# Patient Record
Sex: MALE | Race: OTHER | URBAN - METROPOLITAN AREA
[De-identification: names, ages, dates, MRNs, and addresses within clinical notes are randomized per-mention and may not be internally consistent; named-entity substitution may affect disease eponyms.]

---

## 2023-01-05 ENCOUNTER — PREPPED CHART (OUTPATIENT)
Dept: URBAN - METROPOLITAN AREA CLINIC 49 | Facility: CLINIC | Age: 78
End: 2023-01-05

## 2023-01-17 ENCOUNTER — NEW PATIENT (OUTPATIENT)
Dept: URBAN - METROPOLITAN AREA CLINIC 49 | Facility: CLINIC | Age: 78
End: 2023-01-17

## 2023-01-17 DIAGNOSIS — Z79.4: ICD-10-CM

## 2023-01-17 DIAGNOSIS — H25.812: ICD-10-CM

## 2023-01-17 DIAGNOSIS — E11.9: ICD-10-CM

## 2023-01-17 PROCEDURE — 92004 COMPRE OPH EXAM NEW PT 1/>: CPT

## 2023-01-17 ASSESSMENT — KERATOMETRY
OD_AXISANGLE2_DEGREES: 90
OD_AXISANGLE_DEGREES: 180
OS_AXISANGLE2_DEGREES: 171
OS_K2POWER_DIOPTERS: 43.75
OD_K2POWER_DIOPTERS: 43.75
OS_K1POWER_DIOPTERS: 43.50
OD_K1POWER_DIOPTERS: 42.75
OS_AXISANGLE_DEGREES: 81

## 2023-01-17 ASSESSMENT — VISUAL ACUITY
OD_GLARE: 20/20
OD_SC: 20/20-1
OD_GLARE: 20/25
OS_GLARE: 20/50
OS_SC: 20/50-1
OS_GLARE: 20/100

## 2023-01-17 ASSESSMENT — TONOMETRY
OD_IOP_MMHG: 12
OS_IOP_MMHG: 12

## 2023-04-03 ENCOUNTER — POST-OP (OUTPATIENT)
Dept: URBAN - METROPOLITAN AREA CLINIC 49 | Facility: CLINIC | Age: 78
End: 2023-04-03

## 2023-04-03 DIAGNOSIS — Z79.4: ICD-10-CM

## 2023-04-03 DIAGNOSIS — E11.9: ICD-10-CM

## 2023-04-03 DIAGNOSIS — Z96.1: ICD-10-CM

## 2023-04-03 DIAGNOSIS — Z98.42: ICD-10-CM

## 2023-04-03 PROCEDURE — 92015 DETERMINE REFRACTIVE STATE: CPT

## 2023-04-03 PROCEDURE — 99024 POSTOP FOLLOW-UP VISIT: CPT

## 2023-04-03 ASSESSMENT — VISUAL ACUITY
OD_SC: 20/25
OD_GLARE: 20/30
OU_SC: 20/25
OS_SC: 20/25
OD_GLARE: 20/30

## 2023-04-03 ASSESSMENT — KERATOMETRY
OD_AXISANGLE_DEGREES: 005
OS_K2POWER_DIOPTERS: 43.50
OS_AXISANGLE2_DEGREES: 4
OS_K1POWER_DIOPTERS: 43.25
OD_AXISANGLE2_DEGREES: 95
OD_K1POWER_DIOPTERS: 43.00
OD_K2POWER_DIOPTERS: 43.50
OS_AXISANGLE_DEGREES: 094

## 2023-04-03 ASSESSMENT — TONOMETRY
OS_IOP_MMHG: 11
OD_IOP_MMHG: 12

## 2025-01-01 ENCOUNTER — APPOINTMENT (INPATIENT)
Dept: CARDIOLOGY | Facility: HOSPITAL | Age: 80
End: 2025-01-01
Attending: PHYSICIAN ASSISTANT
Payer: COMMERCIAL

## 2025-01-01 ENCOUNTER — APPOINTMENT (EMERGENCY)
Dept: RADIOLOGY | Facility: HOSPITAL | Age: 80
End: 2025-01-01
Attending: EMERGENCY MEDICINE
Payer: COMMERCIAL

## 2025-01-01 ENCOUNTER — HOSPITAL ENCOUNTER (INPATIENT)
Facility: HOSPITAL | Age: 80
LOS: 4 days | End: 2025-06-05
Attending: EMERGENCY MEDICINE | Admitting: HOSPITALIST
Payer: COMMERCIAL

## 2025-01-01 ENCOUNTER — APPOINTMENT (INPATIENT)
Dept: RADIOLOGY | Facility: HOSPITAL | Age: 80
End: 2025-01-01
Attending: INTERNAL MEDICINE
Payer: COMMERCIAL

## 2025-01-01 ENCOUNTER — APPOINTMENT (INPATIENT)
Dept: RADIOLOGY | Facility: HOSPITAL | Age: 80
DRG: 871 | End: 2025-01-01
Attending: INTERNAL MEDICINE
Payer: COMMERCIAL

## 2025-01-01 ENCOUNTER — APPOINTMENT (EMERGENCY)
Dept: RADIOLOGY | Facility: HOSPITAL | Age: 80
End: 2025-01-01
Payer: COMMERCIAL

## 2025-01-01 ENCOUNTER — APPOINTMENT (INPATIENT)
Dept: RADIOLOGY | Facility: HOSPITAL | Age: 80
End: 2025-01-01
Attending: STUDENT IN AN ORGANIZED HEALTH CARE EDUCATION/TRAINING PROGRAM
Payer: COMMERCIAL

## 2025-01-01 VITALS
RESPIRATION RATE: 24 BRPM | HEART RATE: 72 BPM | SYSTOLIC BLOOD PRESSURE: 66 MMHG | DIASTOLIC BLOOD PRESSURE: 33 MMHG | OXYGEN SATURATION: 98 % | HEIGHT: 73 IN | TEMPERATURE: 98.7 F | BODY MASS INDEX: 30.27 KG/M2 | WEIGHT: 228.4 LBS

## 2025-01-01 DIAGNOSIS — J18.1 LUNG CONSOLIDATION (CMS/HCC): ICD-10-CM

## 2025-01-01 DIAGNOSIS — I50.9 ACUTE ON CHRONIC CONGESTIVE HEART FAILURE, UNSPECIFIED HEART FAILURE TYPE (CMS/HCC): ICD-10-CM

## 2025-01-01 DIAGNOSIS — R06.02 SHORTNESS OF BREATH: Primary | ICD-10-CM

## 2025-01-01 DIAGNOSIS — J18.9 PNEUMONIA OF BOTH LOWER LOBES DUE TO INFECTIOUS ORGANISM: ICD-10-CM

## 2025-01-01 DIAGNOSIS — K76.9 LIVER LESION: ICD-10-CM

## 2025-01-01 DIAGNOSIS — E87.1 HYPONATREMIA: ICD-10-CM

## 2025-01-01 DIAGNOSIS — R79.89 ELEVATED TROPONIN: ICD-10-CM

## 2025-01-01 LAB
AFP SERPL-MCNC: 1.3 NG/ML
ALBUMIN SERPL-MCNC: 3.8 G/DL (ref 3.5–5.7)
ALBUMIN SERPL-MCNC: 3.8 G/DL (ref 3.5–5.7)
ALP SERPL-CCNC: 133 IU/L (ref 34–125)
ALP SERPL-CCNC: 163 IU/L (ref 34–125)
ALT SERPL-CCNC: 49 IU/L (ref 7–52)
ALT SERPL-CCNC: 70 IU/L (ref 7–52)
AMPHET UR QL SCN: NOT DETECTED
ANION GAP SERPL CALC-SCNC: 10 MEQ/L (ref 3–15)
ANION GAP SERPL CALC-SCNC: 14 MEQ/L (ref 3–15)
ANION GAP SERPL CALC-SCNC: 7 MEQ/L (ref 3–15)
ANION GAP SERPL CALC-SCNC: 9 MEQ/L (ref 3–15)
AORTIC ROOT ANNULUS: 3.2 CM
AORTIC VALVE MEAN VELOCITY: 1.25 M/S
AORTIC VALVE VELOCITY TIME INTEGRAL: 31.8 CM
APTT PPP: 103 SEC (ref 23–35)
APTT PPP: 121 SEC (ref 23–35)
APTT PPP: 135 SEC (ref 23–35)
APTT PPP: 205 SEC (ref 23–35)
APTT PPP: 32 SEC (ref 23–35)
APTT PPP: 32 SEC (ref 23–35)
APTT PPP: 59 SEC (ref 23–35)
APTT PPP: 62 SEC (ref 23–35)
APTT PPP: 69 SEC (ref 23–35)
APTT PPP: 72 SEC (ref 23–35)
APTT PPP: 76 SEC (ref 23–35)
APTT PPP: 78 SEC (ref 23–35)
APTT PPP: 83 SEC (ref 23–35)
APTT PPP: 83 SEC (ref 23–35)
APTT PPP: 95 SEC (ref 23–35)
ASCENDING AORTA: 3.2 CM
AST SERPL-CCNC: 53 IU/L (ref 13–39)
AST SERPL-CCNC: 75 IU/L (ref 13–39)
AV MEAN GRADIENT: 7 MMHG
AV PEAK GRADIENT: 14 MMHG
AV PEAK VELOCITY-S: 1.84 M/S
AV VALVE AREA INDEX: 0.8
AV VALVE AREA: 1.41 CM2
AV VELOCITY RATIO: 0.47
AVA (VTI): 1.81 CM2
B FRAGILIS DNA BLD QL NAA+PROBE: NOT DETECTED
B PARAPERT DNA SPEC QL NAA+PROBE: NEGATIVE
B PERT DNA NPH QL NAA+PROBE: NEGATIVE
BARBITURATES UR QL SCN: NOT DETECTED
BASE EXCESS BLDA CALC-SCNC: -4.3 MEQ/L
BASE EXCESS BLDV CALC-SCNC: -1.8 MEQ/L
BASOPHILS # BLD: 0 K/UL (ref 0.01–0.1)
BASOPHILS # BLD: 0.01 K/UL (ref 0.01–0.1)
BASOPHILS NFR BLD: 0 %
BASOPHILS NFR BLD: 0.1 %
BENZODIAZ UR QL SCN: NOT DETECTED
BILIRUB SERPL-MCNC: 2.1 MG/DL (ref 0.3–1.2)
BILIRUB SERPL-MCNC: 2.6 MG/DL (ref 0.3–1.2)
BILIRUB UR QL STRIP.AUTO: NEGATIVE MG/DL
BLACTX-M ISLT/SPM QL: ABNORMAL
BLAIMP ISLT/SPM QL: ABNORMAL
BLAOXA-48-LIKE ISLT/SPM QL: ABNORMAL
BLAVIM ISLT/SPM QL: ABNORMAL
BNP SERPL-MCNC: 1103 PG/ML
BSA FOR ECHO PROCEDURE: 2.27 M2
BUN SERPL-MCNC: 23 MG/DL (ref 7–25)
BUN SERPL-MCNC: 26 MG/DL (ref 7–25)
BUN SERPL-MCNC: 29 MG/DL (ref 7–25)
BUN SERPL-MCNC: 31 MG/DL (ref 7–25)
BUN SERPL-MCNC: 33 MG/DL (ref 7–25)
BUN SERPL-MCNC: 33 MG/DL (ref 7–25)
BURR CELLS BLD QL SMEAR: ABNORMAL
C ALBICANS DNA BLD QL NAA+PROBE: NOT DETECTED
C AURIS DNA BLD POS QL NAA+PROBE: NOT DETECTED
C GATTII+NEOFOR DNA BLD POS QL NAA+N-PRB: NOT DETECTED
C GLABRATA DNA BLD QL NAA+PROBE: NOT DETECTED
C KRUSEI DNA BLD QL NAA+PROBE: NOT DETECTED
C PARAP DNA BLD QL NAA+PROBE: NOT DETECTED
C PNEUM DNA SPEC QL NAA+PROBE: NEGATIVE
CA-I BLD-SCNC: 1.11 MMOL/L (ref 1.15–1.27)
CALCIUM SERPL-MCNC: 8.5 MG/DL (ref 8.6–10.3)
CALCIUM SERPL-MCNC: 8.5 MG/DL (ref 8.6–10.3)
CALCIUM SERPL-MCNC: 8.7 MG/DL (ref 8.6–10.3)
CALCIUM SERPL-MCNC: 8.8 MG/DL (ref 8.6–10.3)
CALCIUM SERPL-MCNC: 9.2 MG/DL (ref 8.6–10.3)
CALCIUM SERPL-MCNC: 9.2 MG/DL (ref 8.6–10.3)
CANNABINOIDS UR QL SCN: NOT DETECTED
CEA SERPL-MCNC: 10 NG/ML
CHLORIDE BLDA-SCNC: 100 MEQ/L (ref 98–109)
CHLORIDE SERPL-SCNC: 100 MEQ/L (ref 98–107)
CHLORIDE SERPL-SCNC: 102 MEQ/L (ref 98–107)
CHLORIDE SERPL-SCNC: 102 MEQ/L (ref 98–107)
CHLORIDE SERPL-SCNC: 103 MEQ/L (ref 98–107)
CHLORIDE SERPL-SCNC: 94 MEQ/L (ref 98–107)
CHLORIDE SERPL-SCNC: 94 MEQ/L (ref 98–107)
CHLORIDE UR-SCNC: <15 MEQ/L
CLARITY UR REFRACT.AUTO: CLEAR
CO2 BLDA-SCNC: 20.1 MEQ/L (ref 22–32)
CO2 BLDV-SCNC: 25 MEQ/L (ref 22–32)
CO2 SERPL-SCNC: 19 MEQ/L (ref 21–31)
CO2 SERPL-SCNC: 22 MEQ/L (ref 21–31)
CO2 SERPL-SCNC: 22 MEQ/L (ref 21–31)
CO2 SERPL-SCNC: 23 MEQ/L (ref 21–31)
CO2 SERPL-SCNC: 23 MEQ/L (ref 21–31)
CO2 SERPL-SCNC: 25 MEQ/L (ref 21–31)
COCAINE UR QL SCN: NOT DETECTED
COHGB MFR BLD: 2.1 %
COLISTIN RES MCR-1 ISLT/SPM QL: ABNORMAL
COLOR UR AUTO: YELLOW
CPR GENES ISLT NAA+PROBE: ABNORMAL
CREAT SERPL-MCNC: 0.8 MG/DL (ref 0.7–1.3)
CREAT SERPL-MCNC: 0.9 MG/DL (ref 0.7–1.3)
CREAT SERPL-MCNC: 0.9 MG/DL (ref 0.7–1.3)
CREAT SERPL-MCNC: 1.1 MG/DL (ref 0.7–1.3)
CREAT SERPL-MCNC: 1.1 MG/DL (ref 0.7–1.3)
CREAT SERPL-MCNC: 1.3 MG/DL (ref 0.7–1.3)
CREAT UR-MCNC: 49.9 MG/DL
DIFFERENTIAL METHOD BLD: ABNORMAL
DIFFERENTIAL METHOD BLD: ABNORMAL
DOP CALC LVOT STROKE VOLUME: 57.37 CM3
E CLOAC COMP DNA BLD POS NAA+NON-PROBE: NOT DETECTED
E COLI DNA SPEC QL NAA+PROBE: NOT DETECTED
E FAECALIS DNA SPEC QL NAA+PROBE: NOT DETECTED
E FAECIUM DNA SPEC QL NAA+PROBE: NOT DETECTED
E WAVE DECELERATION TIME: 188 MS
EGFRCR SERPLBLD CKD-EPI 2021: 55.5 ML/MIN/1.73M*2
EGFRCR SERPLBLD CKD-EPI 2021: >60 ML/MIN/1.73M*2
ENTEROBACTERALES DNA BLD POS NAA+N-PRB: NOT DETECTED
EOSINOPHIL # BLD: 0 K/UL (ref 0.04–0.54)
EOSINOPHIL # BLD: 0 K/UL (ref 0.04–0.54)
EOSINOPHIL NFR BLD: 0 %
EOSINOPHIL NFR BLD: 0 %
ERYTHROCYTE [DISTWIDTH] IN BLOOD BY AUTOMATED COUNT: 17.5 % (ref 11.6–14.4)
ERYTHROCYTE [DISTWIDTH] IN BLOOD BY AUTOMATED COUNT: 17.9 % (ref 11.6–14.4)
ERYTHROCYTE [DISTWIDTH] IN BLOOD BY AUTOMATED COUNT: 18.2 % (ref 11.6–14.4)
ERYTHROCYTE [DISTWIDTH] IN BLOOD BY AUTOMATED COUNT: 18.3 % (ref 11.6–14.4)
FENTANYL CTO UR SCN-MCNC: NOT DETECTED NG/ML
FIO2 ON VENT: 27 %
FIO2 ON VENT: ABNORMAL %
FLUAV RNA SPEC QL NAA+PROBE: NEGATIVE
FLUAV RNA SPEC QL NAA+PROBE: NEGATIVE
FLUBV RNA SPEC QL NAA+PROBE: NEGATIVE
FLUBV RNA SPEC QL NAA+PROBE: NEGATIVE
FRACTIONAL SHORTENING: 24.62 %
GIANT PLATELETS BLD QL SMEAR: ABNORMAL
GLUCOSE BLD-MCNC: 113 MG/DL (ref 70–99)
GLUCOSE BLD-MCNC: 195 MG/DL (ref 70–99)
GLUCOSE BLD-MCNC: 200 MG/DL (ref 70–99)
GLUCOSE BLD-MCNC: 208 MG/DL (ref 70–99)
GLUCOSE BLD-MCNC: 226 MG/DL (ref 70–99)
GLUCOSE BLD-MCNC: 235 MG/DL (ref 70–99)
GLUCOSE BLD-MCNC: 240 MG/DL (ref 70–99)
GLUCOSE BLD-MCNC: 262 MG/DL (ref 70–99)
GLUCOSE BLD-MCNC: 277 MG/DL (ref 70–99)
GLUCOSE BLD-MCNC: 286 MG/DL (ref 70–99)
GLUCOSE BLD-MCNC: 286 MG/DL (ref 70–99)
GLUCOSE BLD-MCNC: 296 MG/DL (ref 70–99)
GLUCOSE BLD-MCNC: 300 MG/DL (ref 70–99)
GLUCOSE BLD-MCNC: 315 MG/DL (ref 70–99)
GLUCOSE BLD-MCNC: 330 MG/DL (ref 70–99)
GLUCOSE BLD-MCNC: 338 MG/DL (ref 70–99)
GLUCOSE BLDA-MCNC: 299 MG/DL (ref 70–99)
GLUCOSE SERPL-MCNC: 223 MG/DL (ref 70–99)
GLUCOSE SERPL-MCNC: 227 MG/DL (ref 70–99)
GLUCOSE SERPL-MCNC: 228 MG/DL (ref 70–99)
GLUCOSE SERPL-MCNC: 273 MG/DL (ref 70–99)
GLUCOSE SERPL-MCNC: 275 MG/DL (ref 70–99)
GLUCOSE SERPL-MCNC: 317 MG/DL (ref 70–99)
GLUCOSE UR STRIP.AUTO-MCNC: NEGATIVE MG/DL
GP B STREP DNA SPEC QL NAA+PROBE: NOT DETECTED
GRAM STN SPEC: NORMAL
HADV DNA SPEC QL NAA+PROBE: NEGATIVE
HAEM INFLU DNA SPEC QL NAA+PROBE: NOT DETECTED
HCO3 BLDA-SCNC: 21.3 MEQ/L (ref 21–28)
HCO3 BLDV-SCNC: 22.3 MEQ/L (ref 21–28)
HCOV 229E RNA SPEC QL NAA+PROBE: NEGATIVE
HCOV HKU1 RNA SPEC QL NAA+PROBE: NEGATIVE
HCOV NL63 RNA SPEC QL NAA+PROBE: NEGATIVE
HCOV OC43 RNA SPEC QL NAA+PROBE: NEGATIVE
HCT VFR BLD AUTO: 26.2 % (ref 40.1–51)
HCT VFR BLD AUTO: 28 % (ref 40.1–51)
HCT VFR BLD AUTO: 28.4 % (ref 40.1–51)
HCT VFR BLD AUTO: 33.2 % (ref 40.1–51)
HGB BLD-MCNC: 10.8 G/DL (ref 13.7–17.5)
HGB BLD-MCNC: 8.3 G/DL (ref 13.7–17.5)
HGB BLD-MCNC: 8.7 G/DL (ref 13.7–17.5)
HGB BLD-MCNC: 9.1 G/DL (ref 13.7–17.5)
HGB BLDA OXIMETRY-MCNC: 9.7 G/DL (ref 14–17.5)
HGB UR QL STRIP.AUTO: NEGATIVE
HMPV RNA SPEC QL NAA+PROBE: NEGATIVE
HPIV1 RNA SPEC QL NAA+PROBE: NEGATIVE
HPIV2 RNA SPEC QL NAA+PROBE: NEGATIVE
HPIV3 RNA SPEC QL NAA+PROBE: NEGATIVE
HPIV4 RNA SPEC QL NAA+PROBE: NEGATIVE
IMM GRANULOCYTES # BLD AUTO: 0.18 K/UL (ref 0–0.08)
IMM GRANULOCYTES NFR BLD AUTO: 1 %
INHALED O2 CONCENTRATION: ABNORMAL %
INHALED O2 CONCENTRATION: ABNORMAL %
INR PPP: 1.5
INR PPP: 1.6
INTERVENTRICULAR SEPTUM: 1.11 CM
K OXYTOCA DNA BLD QL NAA+PROBE: NOT DETECTED
K PNEUMON DNA SPEC QL NAA+PROBE: NOT DETECTED
K. AEROGENES DNA SPEC QL NAA+PROBE: NOT DETECTED
KETONES UR STRIP.AUTO-MCNC: NEGATIVE MG/DL
L MONOCYTOG DNA SPEC QL NAA+PROBE: NOT DETECTED
LA ESV (BP): 119 CM3
LA ESV INDEX (A2C): 52.86 CM3/M2
LA ESV INDEX (BP): 52.42 CM3/M2
LA/AORTA RATIO: 1.75
LAAS-AP2: 34.2 CM2
LAAS-AP4: 30.7 CM2
LACTATE BLDA-SCNC: 2.3 MMOL/L (ref 0.4–1.6)
LACTATE SERPL-SCNC: 1.9 MMOL/L (ref 0.4–2)
LACTATE SERPL-SCNC: 2.2 MMOL/L (ref 0.4–2)
LACTATE SERPL-SCNC: 2.3 MMOL/L (ref 0.4–2)
LACTATE SERPL-SCNC: 2.4 MMOL/L (ref 0.4–2)
LAD 2D: 5.6 CM
LAL MED-LAT (A4C): 7.18 CM
LAV-S: 120 CM3
LDH SERPL L TO P-CCNC: 437 IU/L (ref 98–271)
LDH SERPL L TO P-CCNC: 466 IU/L (ref 98–271)
LEFT ATRIAL LENGTH SUPERIOR-INFERIOR (APICAL 2-CHAMBER VIEW): 7.96 CM
LEFT ATRIUM VOLUME INDEX: 47.58 CM3/M2
LEFT ATRIUM VOLUME: 108 CM3
LEFT INTERNAL DIMENSION IN SYSTOLE: 3.95 CM (ref 3.95–5.98)
LEFT VENTRICULAR INTERNAL DIMENSION IN DIASTOLE: 5.24 CM (ref 6.8–9.45)
LEFT VENTRICULAR POSTERIOR WALL IN END DIASTOLE: 1.27 CM (ref 0.82–1.53)
LEUKOCYTE ESTERASE UR QL STRIP.AUTO: NEGATIVE
LVOT 2D: 2.2 CM
LVOT A: 3.8 CM2
LVOT MG: 2 MMHG
LVOT MV: 0.63 M/S
LVOT PEAK VELOCITY: 0.87 M/S
LVOT PG: 3 MMHG
LVOT STROKE VOLUME INDEX: 25.27 ML/M2
LVOT VTI: 15.1 CM
LYMPHOCYTES # BLD: 0.38 K/UL (ref 1.2–3.5)
LYMPHOCYTES # BLD: 1.77 K/UL (ref 1.2–3.5)
LYMPHOCYTES NFR BLD: 2.1 %
LYMPHOCYTES NFR BLD: 7 %
M PNEUMO DNA SPEC QL NAA+PROBE: NEGATIVE
MACROCYTES BLD QL SMEAR: ABNORMAL
MAGNESIUM SERPL-MCNC: 2 MG/DL (ref 1.8–2.5)
MAGNESIUM SERPL-MCNC: 2.1 MG/DL (ref 1.8–2.5)
MCH RBC QN AUTO: 34.1 PG (ref 28–33.2)
MCH RBC QN AUTO: 34.7 PG (ref 28–33.2)
MCH RBC QN AUTO: 34.7 PG (ref 28–33.2)
MCH RBC QN AUTO: 35 PG (ref 28–33.2)
MCHC RBC AUTO-ENTMCNC: 31.1 G/DL (ref 32.2–36.5)
MCHC RBC AUTO-ENTMCNC: 31.7 G/DL (ref 32.2–36.5)
MCHC RBC AUTO-ENTMCNC: 32 G/DL (ref 32.2–36.5)
MCHC RBC AUTO-ENTMCNC: 32.5 G/DL (ref 32.2–36.5)
MCV RBC AUTO: 106.8 FL (ref 83–98)
MCV RBC AUTO: 109.2 FL (ref 83–98)
MCV RBC AUTO: 109.6 FL (ref 83–98)
MCV RBC AUTO: 109.8 FL (ref 83–98)
MECA ISLT/SPM QL: DETECTED
MECA+MECC+MREJ ISLT/SPM QL: ABNORMAL
METHGB BLD-SCNC: 0.7 % (ref 0.4–1.5)
MLH CV ECHO AVA INDEX VELOCITY RATIO: 0.6
MONOCYTES # BLD: 0.39 K/UL (ref 0.3–1)
MONOCYTES # BLD: 2.02 K/UL (ref 0.3–1)
MONOCYTES NFR BLD: 2.2 %
MONOCYTES NFR BLD: 8 %
MV PEAK E VEL: 0.98 M/S
MV STENOSIS PRESSURE HALF TIME: 55 MS
MV VALVE AREA P 1/2 METHOD: 4 CM2
MYELOCYTES # BLD MANUAL: 0.5 K/UL
MYELOCYTES NFR BLD MANUAL: 2 %
N MEN DNA BLD QL NAA+PROBE: NOT DETECTED
NDM: ABNORMAL
NEUTROPHILS # BLD: 16.97 K/UL (ref 1.7–7)
NEUTS BAND # BLD: 0.25 K/UL (ref 0–0.53)
NEUTS BAND # BLD: 20.69 K/UL (ref 1.7–7)
NEUTS BAND NFR BLD: 1 %
NEUTS SEG NFR BLD: 82 %
NEUTS SEG NFR BLD: 94.6 %
NITRITE UR QL STRIP.AUTO: NEGATIVE
NRBC BLD-RTO: 0.1 %
OPIATES UR QL SCN: NOT DETECTED
OSMOLALITY SERPL: 293 MOSM/KG (ref 280–300)
OSMOLALITY UR: 341 MOSM/KG (ref 100–1400)
P AERUGINOSA DNA SPEC QL NAA+PROBE: NOT DETECTED
PCO2 BLDA: 29 MM HG (ref 35–48)
PCO2 BLDV: 43 MM HG (ref 41–51)
PCP UR QL SCN: NOT DETECTED
PH BLDA: 7.43 [PH] (ref 7.35–7.45)
PH BLDV: 7.35 [PH] (ref 7.32–7.42)
PH UR STRIP.AUTO: 6 [PH]
PLATELET # BLD AUTO: 117 K/UL (ref 150–350)
PLATELET # BLD AUTO: 149 K/UL (ref 150–350)
PLATELET # BLD AUTO: 98 K/UL (ref 150–350)
PLATELET # BLD AUTO: 99 K/UL (ref 150–350)
PLATELET # BLD EST: ABNORMAL 10*3/UL
PLATELET # BLD EST: ABNORMAL 10*3/UL
PMV BLD AUTO: 8.4 FL (ref 9.4–12.4)
PMV BLD AUTO: 8.7 FL (ref 9.4–12.4)
PO2 BLDA: 49 MM HG (ref 83–100)
PO2 BLDV: <20 MM HG (ref 25–40)
POCT TEST: ABNORMAL
POLYCHROMASIA BLD QL SMEAR: ABNORMAL
POSTERIOR WALL: 1.27 CM
POTASSIUM BLDA-SCNC: 4.4 MEQ/L (ref 3.4–4.5)
POTASSIUM SERPL-SCNC: 3.9 MEQ/L (ref 3.5–5.1)
POTASSIUM SERPL-SCNC: 4 MEQ/L (ref 3.5–5.1)
POTASSIUM SERPL-SCNC: 4.2 MEQ/L (ref 3.5–5.1)
POTASSIUM SERPL-SCNC: 4.3 MEQ/L (ref 3.5–5.1)
POTASSIUM UR-SCNC: 15.1 MEQ/L
PROT SERPL-MCNC: 6.4 G/DL (ref 6–8.2)
PROT SERPL-MCNC: 7.2 G/DL (ref 6–8.2)
PROT UR QL STRIP.AUTO: NEGATIVE
PROTEUS SP DNA BLD POS QL NAA+NON-PROBE: NOT DETECTED
PROTHROMBIN TIME: 17.6 SEC (ref 12.2–14.5)
PROTHROMBIN TIME: 18.4 SEC (ref 12.2–14.5)
PSA SERPL-MCNC: 4.5 NG/ML
QRS DURATION: 94
QT INTERVAL: 406
QTC CALCULATION(BAZETT): 468
R AXIS: 2
RBC # BLD AUTO: 2.39 M/UL (ref 4.5–5.8)
RBC # BLD AUTO: 2.55 M/UL (ref 4.5–5.8)
RBC # BLD AUTO: 2.6 M/UL (ref 4.5–5.8)
RBC # BLD AUTO: 3.11 M/UL (ref 4.5–5.8)
RSV RNA SPEC QL NAA+PROBE: NEGATIVE
RSV RNA SPEC QL NAA+PROBE: NEGATIVE
RV+EV RNA SPEC QL NAA+PROBE: NEGATIVE
S AUREUS DNA SPEC QL NAA+PROBE: NOT DETECTED
S EPIDERMIDIS DNA SPEC QL NAA+PROBE: DETECTED
S HAEMOLYTICUS DNA BLD QL NAA+PROBE: NOT DETECTED
S MALTOPH DNA SPEC QL NAA+PROBE: NOT DETECTED
S MARCESCENS DNA SPEC QL NAA+PROBE: NOT DETECTED
S PNEUM DNA BLD QL NAA+PROBE: NOT DETECTED
S PYO DNA SPEC NAA+PROBE: NOT DETECTED
SALMONELLA DNA SPEC QL NAA+PROBE: NOT DETECTED
SAO2 % BLDA: 81 % (ref 93–98)
SARS-COV-2 RNA RESP QL NAA+PROBE: NEGATIVE
SARS-COV-2 RNA RESP QL NAA+PROBE: NEGATIVE
SEPTAL TISSUE DOPPLER FREE WALL LATE DIA VELOCITY (APICAL 4 CHAMBER VIEW): 0.11 M/S
SODIUM BLDA-SCNC: 125 MEQ/L (ref 136–145)
SODIUM SERPL-SCNC: 126 MEQ/L (ref 136–145)
SODIUM SERPL-SCNC: 127 MEQ/L (ref 136–145)
SODIUM SERPL-SCNC: 132 MEQ/L (ref 136–145)
SODIUM SERPL-SCNC: 134 MEQ/L (ref 136–145)
SODIUM UR-SCNC: <10 MEQ/L
SP GR UR REFRACT.AUTO: 1.03
STREPTOCOCCUS DNA BLD QL NAA+PROBE: NOT DETECTED
T WAVE AXIS: -41
TAPSE: 1.23 CM
TEST PERFORMANCE INFO SPEC: ABNORMAL
TR MAX PG: 27.04 MMHG
TRICUSPID VALVE PEAK REGURGITATION VELOCITY: 2.6 M/S
TROPONIN I SERPL HS-MCNC: 256.3 PG/ML
TROPONIN I SERPL HS-MCNC: 308.3 PG/ML
TROPONIN I SERPL HS-MCNC: 351.3 PG/ML
UROBILINOGEN UR STRIP-ACNC: 0.2 EU/DL
VANA+VANB ISLT/SPM QL: ABNORMAL
VANCOMYCIN TROUGH SERPL-MCNC: 13.2 UG/ML (ref 10–20)
VENTRICULAR RATE: 80
WBC # BLD AUTO: 17.93 K/UL (ref 3.8–10.5)
WBC # BLD AUTO: 21.62 K/UL (ref 3.8–10.5)
WBC # BLD AUTO: 25.23 K/UL (ref 3.8–10.5)
WBC # BLD AUTO: 27.68 K/UL (ref 3.8–10.5)
Z-SCORE OF LEFT VENTRICULAR DIMENSION IN END DIASTOLE: -4.25
Z-SCORE OF LEFT VENTRICULAR DIMENSION IN END SYSTOLE: -1.63
Z-SCORE OF LEFT VENTRICULAR POSTERIOR WALL IN END DIASTOLE: 0.66

## 2025-01-01 PROCEDURE — 25800000 HC PHARMACY IV SOLUTIONS: Performed by: INTERNAL MEDICINE

## 2025-01-01 PROCEDURE — 25800000 HC PHARMACY IV SOLUTIONS: Performed by: HOSPITALIST

## 2025-01-01 PROCEDURE — 99233 SBSQ HOSP IP/OBS HIGH 50: CPT | Mod: 25 | Performed by: NURSE PRACTITIONER

## 2025-01-01 PROCEDURE — 84153 ASSAY OF PSA TOTAL: CPT | Performed by: INTERNAL MEDICINE

## 2025-01-01 PROCEDURE — 85027 COMPLETE CBC AUTOMATED: CPT | Performed by: NURSE PRACTITIONER

## 2025-01-01 PROCEDURE — 80053 COMPREHEN METABOLIC PANEL: CPT | Performed by: EMERGENCY MEDICINE

## 2025-01-01 PROCEDURE — 63600000 HC DRUGS/DETAIL CODE: Mod: JZ | Performed by: PHYSICIAN ASSISTANT

## 2025-01-01 PROCEDURE — 83935 ASSAY OF URINE OSMOLALITY: CPT | Performed by: REGISTERED NURSE

## 2025-01-01 PROCEDURE — 99497 ADVNCD CARE PLAN 30 MIN: CPT | Mod: 25 | Performed by: NURSE PRACTITIONER

## 2025-01-01 PROCEDURE — 20600000 HC ROOM AND CARE INTERMEDIATE/TELEMETRY

## 2025-01-01 PROCEDURE — 99233 SBSQ HOSP IP/OBS HIGH 50: CPT | Performed by: INTERNAL MEDICINE

## 2025-01-01 PROCEDURE — 63600000 HC DRUGS/DETAIL CODE: Mod: JZ | Performed by: NURSE PRACTITIONER

## 2025-01-01 PROCEDURE — 25000000 HC PHARMACY GENERAL: Performed by: HOSPITALIST

## 2025-01-01 PROCEDURE — 63700000 HC SELF-ADMINISTRABLE DRUG: Performed by: STUDENT IN AN ORGANIZED HEALTH CARE EDUCATION/TRAINING PROGRAM

## 2025-01-01 PROCEDURE — 96375 TX/PRO/DX INJ NEW DRUG ADDON: CPT | Mod: 59

## 2025-01-01 PROCEDURE — 27900059 OXYGEN DAILY

## 2025-01-01 PROCEDURE — 63700000 HC SELF-ADMINISTRABLE DRUG: Performed by: INTERNAL MEDICINE

## 2025-01-01 PROCEDURE — 85730 THROMBOPLASTIN TIME PARTIAL: CPT | Performed by: STUDENT IN AN ORGANIZED HEALTH CARE EDUCATION/TRAINING PROGRAM

## 2025-01-01 PROCEDURE — 63600000 HC DRUGS/DETAIL CODE: Mod: JZ | Performed by: HOSPITALIST

## 2025-01-01 PROCEDURE — 25800000 HC PHARMACY IV SOLUTIONS: Performed by: STUDENT IN AN ORGANIZED HEALTH CARE EDUCATION/TRAINING PROGRAM

## 2025-01-01 PROCEDURE — 25000000 HC PHARMACY GENERAL: Performed by: INTERNAL MEDICINE

## 2025-01-01 PROCEDURE — 87070 CULTURE OTHR SPECIMN AEROBIC: CPT | Performed by: INTERNAL MEDICINE

## 2025-01-01 PROCEDURE — 63700000 HC SELF-ADMINISTRABLE DRUG: Performed by: HOSPITALIST

## 2025-01-01 PROCEDURE — 85730 THROMBOPLASTIN TIME PARTIAL: CPT | Performed by: NURSE PRACTITIONER

## 2025-01-01 PROCEDURE — 80048 BASIC METABOLIC PNL TOTAL CA: CPT | Performed by: NURSE PRACTITIONER

## 2025-01-01 PROCEDURE — 85610 PROTHROMBIN TIME: CPT | Performed by: EMERGENCY MEDICINE

## 2025-01-01 PROCEDURE — 82803 BLOOD GASES ANY COMBINATION: CPT | Performed by: HOSPITALIST

## 2025-01-01 PROCEDURE — 87305 ASPERGILLUS AG IA: CPT | Performed by: INTERNAL MEDICINE

## 2025-01-01 PROCEDURE — 96365 THER/PROPH/DIAG IV INF INIT: CPT | Mod: 59

## 2025-01-01 PROCEDURE — 63600000 HC DRUGS/DETAIL CODE: Mod: JZ | Performed by: EMERGENCY MEDICINE

## 2025-01-01 PROCEDURE — 93010 ELECTROCARDIOGRAM REPORT: CPT | Performed by: STUDENT IN AN ORGANIZED HEALTH CARE EDUCATION/TRAINING PROGRAM

## 2025-01-01 PROCEDURE — 63600000 HC DRUGS/DETAIL CODE: Mod: JZ,TB | Performed by: INTERNAL MEDICINE

## 2025-01-01 PROCEDURE — 99223 1ST HOSP IP/OBS HIGH 75: CPT | Performed by: NURSE PRACTITIONER

## 2025-01-01 PROCEDURE — 83735 ASSAY OF MAGNESIUM: CPT | Performed by: NURSE PRACTITIONER

## 2025-01-01 PROCEDURE — 87385 HISTOPLASMA CAPSUL AG IA: CPT | Performed by: INTERNAL MEDICINE

## 2025-01-01 PROCEDURE — 63600000 HC DRUGS/DETAIL CODE: Mod: JZ | Performed by: INTERNAL MEDICINE

## 2025-01-01 PROCEDURE — 0202U NFCT DS 22 TRGT SARS-COV-2: CPT | Performed by: INTERNAL MEDICINE

## 2025-01-01 PROCEDURE — 80048 BASIC METABOLIC PNL TOTAL CA: CPT | Performed by: INTERNAL MEDICINE

## 2025-01-01 PROCEDURE — 82378 CARCINOEMBRYONIC ANTIGEN: CPT | Performed by: HOSPITALIST

## 2025-01-01 PROCEDURE — 99285 EMERGENCY DEPT VISIT HI MDM: CPT | Mod: 25

## 2025-01-01 PROCEDURE — 1123F ACP DISCUSS/DSCN MKR DOCD: CPT | Performed by: NURSE PRACTITIONER

## 2025-01-01 PROCEDURE — 83930 ASSAY OF BLOOD OSMOLALITY: CPT | Performed by: REGISTERED NURSE

## 2025-01-01 PROCEDURE — 82945 GLUCOSE OTHER FLUID: CPT | Performed by: EMERGENCY MEDICINE

## 2025-01-01 PROCEDURE — 51702 INSERT TEMP BLADDER CATH: CPT

## 2025-01-01 PROCEDURE — C8929 TTE W OR WO FOL WCON,DOPPLER: HCPCS

## 2025-01-01 PROCEDURE — 83605 ASSAY OF LACTIC ACID: CPT | Performed by: EMERGENCY MEDICINE

## 2025-01-01 PROCEDURE — 83880 ASSAY OF NATRIURETIC PEPTIDE: CPT | Performed by: EMERGENCY MEDICINE

## 2025-01-01 PROCEDURE — P9045 ALBUMIN (HUMAN), 5%, 250 ML: HCPCS | Mod: JZ | Performed by: HOSPITALIST

## 2025-01-01 PROCEDURE — 63600000 HC DRUGS/DETAIL CODE: Mod: JZ | Performed by: REGISTERED NURSE

## 2025-01-01 PROCEDURE — 83605 ASSAY OF LACTIC ACID: CPT | Performed by: HOSPITALIST

## 2025-01-01 PROCEDURE — 87077 CULTURE AEROBIC IDENTIFY: CPT | Performed by: REGISTERED NURSE

## 2025-01-01 PROCEDURE — 63600000 HC DRUGS/DETAIL CODE: Performed by: HOSPITALIST

## 2025-01-01 PROCEDURE — 71045 X-RAY EXAM CHEST 1 VIEW: CPT

## 2025-01-01 PROCEDURE — 83615 LACTATE (LD) (LDH) ENZYME: CPT | Performed by: HOSPITALIST

## 2025-01-01 PROCEDURE — 82438 ASSAY OTHER FLUID CHLORIDES: CPT | Performed by: REGISTERED NURSE

## 2025-01-01 PROCEDURE — 99239 HOSP IP/OBS DSCHRG MGMT >30: CPT | Performed by: STUDENT IN AN ORGANIZED HEALTH CARE EDUCATION/TRAINING PROGRAM

## 2025-01-01 PROCEDURE — 85025 COMPLETE CBC W/AUTO DIFF WBC: CPT | Performed by: EMERGENCY MEDICINE

## 2025-01-01 PROCEDURE — 99233 SBSQ HOSP IP/OBS HIGH 50: CPT | Performed by: STUDENT IN AN ORGANIZED HEALTH CARE EDUCATION/TRAINING PROGRAM

## 2025-01-01 PROCEDURE — 94660 CPAP INITIATION&MGMT: CPT

## 2025-01-01 PROCEDURE — 71275 CT ANGIOGRAPHY CHEST: CPT

## 2025-01-01 PROCEDURE — 85730 THROMBOPLASTIN TIME PARTIAL: CPT | Performed by: EMERGENCY MEDICINE

## 2025-01-01 PROCEDURE — 36415 COLL VENOUS BLD VENIPUNCTURE: CPT | Performed by: EMERGENCY MEDICINE

## 2025-01-01 PROCEDURE — 82570 ASSAY OF URINE CREATININE: CPT | Performed by: REGISTERED NURSE

## 2025-01-01 PROCEDURE — 74174 CTA ABD&PLVS W/CONTRAST: CPT

## 2025-01-01 PROCEDURE — 82375 ASSAY CARBOXYHB QUANT: CPT | Performed by: EMERGENCY MEDICINE

## 2025-01-01 PROCEDURE — 96374 THER/PROPH/DIAG INJ IV PUSH: CPT | Mod: 59

## 2025-01-01 PROCEDURE — 87449 NOS EACH ORGANISM AG IA: CPT | Performed by: INTERNAL MEDICINE

## 2025-01-01 PROCEDURE — 81003 URINALYSIS AUTO W/O SCOPE: CPT | Performed by: REGISTERED NURSE

## 2025-01-01 PROCEDURE — 36415 COLL VENOUS BLD VENIPUNCTURE: CPT | Performed by: INTERNAL MEDICINE

## 2025-01-01 PROCEDURE — 63700000 HC SELF-ADMINISTRABLE DRUG: Performed by: EMERGENCY MEDICINE

## 2025-01-01 PROCEDURE — 63600105 HC IODINE BASED CONTRAST: Mod: JZ | Performed by: REGISTERED NURSE

## 2025-01-01 PROCEDURE — 84484 ASSAY OF TROPONIN QUANT: CPT | Performed by: HOSPITALIST

## 2025-01-01 PROCEDURE — 84484 ASSAY OF TROPONIN QUANT: CPT | Mod: 91 | Performed by: EMERGENCY MEDICINE

## 2025-01-01 PROCEDURE — 87637 SARSCOV2&INF A&B&RSV AMP PRB: CPT | Performed by: EMERGENCY MEDICINE

## 2025-01-01 PROCEDURE — 94640 AIRWAY INHALATION TREATMENT: CPT

## 2025-01-01 PROCEDURE — 63600000 HC DRUGS/DETAIL CODE: Performed by: INTERNAL MEDICINE

## 2025-01-01 PROCEDURE — 99223 1ST HOSP IP/OBS HIGH 75: CPT | Performed by: INTERNAL MEDICINE

## 2025-01-01 PROCEDURE — 87205 SMEAR GRAM STAIN: CPT | Performed by: INTERNAL MEDICINE

## 2025-01-01 PROCEDURE — 85025 COMPLETE CBC W/AUTO DIFF WBC: CPT | Performed by: STUDENT IN AN ORGANIZED HEALTH CARE EDUCATION/TRAINING PROGRAM

## 2025-01-01 PROCEDURE — 12000000 HC ROOM AND CARE MED/SURG

## 2025-01-01 PROCEDURE — 36600 WITHDRAWAL OF ARTERIAL BLOOD: CPT

## 2025-01-01 PROCEDURE — 27200121 HC CATH FOLEY

## 2025-01-01 PROCEDURE — 83735 ASSAY OF MAGNESIUM: CPT | Performed by: STUDENT IN AN ORGANIZED HEALTH CARE EDUCATION/TRAINING PROGRAM

## 2025-01-01 PROCEDURE — 82105 ALPHA-FETOPROTEIN SERUM: CPT | Performed by: HOSPITALIST

## 2025-01-01 PROCEDURE — 84484 ASSAY OF TROPONIN QUANT: CPT | Performed by: EMERGENCY MEDICINE

## 2025-01-01 PROCEDURE — 84133 ASSAY OF URINE POTASSIUM: CPT | Performed by: REGISTERED NURSE

## 2025-01-01 PROCEDURE — 25800000 HC PHARMACY IV SOLUTIONS: Performed by: REGISTERED NURSE

## 2025-01-01 PROCEDURE — 93005 ELECTROCARDIOGRAM TRACING: CPT

## 2025-01-01 PROCEDURE — 87040 BLOOD CULTURE FOR BACTERIA: CPT | Performed by: REGISTERED NURSE

## 2025-01-01 PROCEDURE — 83605 ASSAY OF LACTIC ACID: CPT | Performed by: REGISTERED NURSE

## 2025-01-01 PROCEDURE — 25500000 HC DRUGS/INCIDENT RAD: Mod: JZ | Performed by: INTERNAL MEDICINE

## 2025-01-01 PROCEDURE — 87154 CUL TYP ID BLD PTHGN 6+ TRGT: CPT | Performed by: REGISTERED NURSE

## 2025-01-01 PROCEDURE — 80048 BASIC METABOLIC PNL TOTAL CA: CPT | Performed by: STUDENT IN AN ORGANIZED HEALTH CARE EDUCATION/TRAINING PROGRAM

## 2025-01-01 PROCEDURE — 36415 COLL VENOUS BLD VENIPUNCTURE: CPT | Performed by: NURSE PRACTITIONER

## 2025-01-01 PROCEDURE — 85730 THROMBOPLASTIN TIME PARTIAL: CPT | Performed by: INTERNAL MEDICINE

## 2025-01-01 PROCEDURE — 83615 LACTATE (LD) (LDH) ENZYME: CPT | Performed by: INTERNAL MEDICINE

## 2025-01-01 PROCEDURE — 85027 COMPLETE CBC AUTOMATED: CPT | Performed by: INTERNAL MEDICINE

## 2025-01-01 PROCEDURE — 96368 THER/DIAG CONCURRENT INF: CPT | Mod: 59

## 2025-01-01 PROCEDURE — 80053 COMPREHEN METABOLIC PANEL: CPT | Performed by: HOSPITALIST

## 2025-01-01 PROCEDURE — 87040 BLOOD CULTURE FOR BACTERIA: CPT | Performed by: INTERNAL MEDICINE

## 2025-01-01 PROCEDURE — 80202 ASSAY OF VANCOMYCIN: CPT | Performed by: INTERNAL MEDICINE

## 2025-01-01 PROCEDURE — 99498 ADVNCD CARE PLAN ADDL 30 MIN: CPT | Mod: 25 | Performed by: NURSE PRACTITIONER

## 2025-01-01 PROCEDURE — P9047 ALBUMIN (HUMAN), 25%, 50ML: HCPCS | Mod: JZ | Performed by: INTERNAL MEDICINE

## 2025-01-01 PROCEDURE — 80307 DRUG TEST PRSMV CHEM ANLYZR: CPT | Performed by: HOSPITALIST

## 2025-01-01 PROCEDURE — 93005 ELECTROCARDIOGRAM TRACING: CPT | Performed by: EMERGENCY MEDICINE

## 2025-01-01 PROCEDURE — 99223 1ST HOSP IP/OBS HIGH 75: CPT | Performed by: HOSPITALIST

## 2025-01-01 RX ORDER — ACETAMINOPHEN 325 MG/1
650 TABLET ORAL EVERY 6 HOURS PRN
Status: DISCONTINUED | OUTPATIENT
Start: 2025-01-01 | End: 2025-01-01 | Stop reason: HOSPADM

## 2025-01-01 RX ORDER — THIAMINE HCL 100 MG
100 TABLET ORAL DAILY
Status: DISCONTINUED | OUTPATIENT
Start: 2025-01-01 | End: 2025-01-01 | Stop reason: HOSPADM

## 2025-01-01 RX ORDER — INSULIN GLARGINE 100 [IU]/ML
12 INJECTION, SOLUTION SUBCUTANEOUS NIGHTLY
Status: DISCONTINUED | OUTPATIENT
Start: 2025-01-01 | End: 2025-01-01 | Stop reason: HOSPADM

## 2025-01-01 RX ORDER — IPRATROPIUM BROMIDE AND ALBUTEROL SULFATE 2.5; .5 MG/3ML; MG/3ML
3 SOLUTION RESPIRATORY (INHALATION) EVERY 6 HOURS
Status: DISCONTINUED | OUTPATIENT
Start: 2025-01-01 | End: 2025-01-01 | Stop reason: HOSPADM

## 2025-01-01 RX ORDER — LORAZEPAM 2 MG/ML
1 INJECTION INTRAMUSCULAR
Status: DISCONTINUED | OUTPATIENT
Start: 2025-01-01 | End: 2025-01-01

## 2025-01-01 RX ORDER — MORPHINE SULFATE 2 MG/ML
2 INJECTION, SOLUTION INTRAMUSCULAR; INTRAVENOUS EVERY 2 HOUR PRN
Status: DISCONTINUED | OUTPATIENT
Start: 2025-01-01 | End: 2025-01-01 | Stop reason: HOSPADM

## 2025-01-01 RX ORDER — LORAZEPAM 2 MG/ML
1 INJECTION INTRAMUSCULAR EVERY 4 HOURS PRN
Status: DISCONTINUED | OUTPATIENT
Start: 2025-01-01 | End: 2025-01-01 | Stop reason: HOSPADM

## 2025-01-01 RX ORDER — METOPROLOL TARTRATE 25 MG/1
25 TABLET, FILM COATED ORAL DAILY
COMMUNITY

## 2025-01-01 RX ORDER — PANTOPRAZOLE SODIUM 40 MG/10ML
40 INJECTION, POWDER, LYOPHILIZED, FOR SOLUTION INTRAVENOUS EVERY 24 HOURS
Status: DISCONTINUED | OUTPATIENT
Start: 2025-01-01 | End: 2025-01-01 | Stop reason: HOSPADM

## 2025-01-01 RX ORDER — FLUTICASONE FUROATE, UMECLIDINIUM BROMIDE AND VILANTEROL TRIFENATATE 200; 62.5; 25 UG/1; UG/1; UG/1
1 POWDER RESPIRATORY (INHALATION) DAILY
COMMUNITY
Start: 2025-01-01

## 2025-01-01 RX ORDER — DOXYCYCLINE 100 MG/1
100 TABLET ORAL 2 TIMES DAILY
COMMUNITY

## 2025-01-01 RX ORDER — FUROSEMIDE 10 MG/ML
20 INJECTION INTRAMUSCULAR; INTRAVENOUS
Status: DISCONTINUED | OUTPATIENT
Start: 2025-01-01 | End: 2025-01-01 | Stop reason: HOSPADM

## 2025-01-01 RX ORDER — ACETAMINOPHEN 500 MG
5 TABLET ORAL NIGHTLY PRN
Status: DISCONTINUED | OUTPATIENT
Start: 2025-01-01 | End: 2025-01-01

## 2025-01-01 RX ORDER — ONDANSETRON HYDROCHLORIDE 2 MG/ML
4 INJECTION, SOLUTION INTRAVENOUS EVERY 8 HOURS PRN
Status: DISCONTINUED | OUTPATIENT
Start: 2025-01-01 | End: 2025-01-01

## 2025-01-01 RX ORDER — ALBUMIN HUMAN 50 G/1000ML
50 SOLUTION INTRAVENOUS ONCE
Status: COMPLETED | OUTPATIENT
Start: 2025-01-01 | End: 2025-01-01

## 2025-01-01 RX ORDER — GLIPIZIDE 5 MG/1
5 TABLET ORAL
COMMUNITY

## 2025-01-01 RX ORDER — LISINOPRIL 10 MG/1
10 TABLET ORAL DAILY
COMMUNITY

## 2025-01-01 RX ORDER — INSULIN LISPRO 100 [IU]/ML
2-10 INJECTION, SOLUTION INTRAVENOUS; SUBCUTANEOUS
Status: DISCONTINUED | OUTPATIENT
Start: 2025-01-01 | End: 2025-01-01 | Stop reason: HOSPADM

## 2025-01-01 RX ORDER — SENNOSIDES 8.6 MG/1
1 TABLET ORAL 2 TIMES DAILY PRN
Status: DISCONTINUED | OUTPATIENT
Start: 2025-01-01 | End: 2025-01-01

## 2025-01-01 RX ORDER — DEXMEDETOMIDINE HYDROCHLORIDE 4 UG/ML
0-1 INJECTION, SOLUTION INTRAVENOUS
Status: DISCONTINUED | OUTPATIENT
Start: 2025-01-01 | End: 2025-01-01 | Stop reason: HOSPADM

## 2025-01-01 RX ORDER — GABAPENTIN 300 MG/1
300 CAPSULE ORAL NIGHTLY
COMMUNITY

## 2025-01-01 RX ORDER — GUAIFENESIN 600 MG/1
600 TABLET, EXTENDED RELEASE ORAL 2 TIMES DAILY
Status: DISCONTINUED | OUTPATIENT
Start: 2025-01-01 | End: 2025-01-01 | Stop reason: HOSPADM

## 2025-01-01 RX ORDER — HEPARIN SODIUM 10000 [USP'U]/100ML
100-4000 INJECTION, SOLUTION INTRAVENOUS
Status: DISCONTINUED | OUTPATIENT
Start: 2025-01-01 | End: 2025-01-01

## 2025-01-01 RX ORDER — LORAZEPAM 2 MG/ML
0.5 INJECTION INTRAMUSCULAR EVERY 2 HOUR PRN
Status: DISCONTINUED | OUTPATIENT
Start: 2025-01-01 | End: 2025-01-01

## 2025-01-01 RX ORDER — TRAMADOL HYDROCHLORIDE 50 MG/1
50 TABLET ORAL EVERY 6 HOURS PRN
Status: DISCONTINUED | OUTPATIENT
Start: 2025-01-01 | End: 2025-01-01

## 2025-01-01 RX ORDER — FAMOTIDINE 20 MG/1
20 TABLET, FILM COATED ORAL NIGHTLY PRN
Status: DISCONTINUED | OUTPATIENT
Start: 2025-01-01 | End: 2025-01-01

## 2025-01-01 RX ORDER — DEXTROSE 50 % IN WATER (D50W) INTRAVENOUS SYRINGE
25 AS NEEDED
Status: DISCONTINUED | OUTPATIENT
Start: 2025-01-01 | End: 2025-01-01 | Stop reason: HOSPADM

## 2025-01-01 RX ORDER — THIAMINE HYDROCHLORIDE 100 MG/ML
200 INJECTION, SOLUTION INTRAMUSCULAR; INTRAVENOUS
Status: COMPLETED | OUTPATIENT
Start: 2025-01-01 | End: 2025-01-01

## 2025-01-01 RX ORDER — VIT C/E/ZN/COPPR/LUTEIN/ZEAXAN 250MG-90MG
1000 CAPSULE ORAL DAILY
Status: DISCONTINUED | OUTPATIENT
Start: 2025-01-01 | End: 2025-01-01 | Stop reason: HOSPADM

## 2025-01-01 RX ORDER — ADHESIVE BANDAGE 7/8"
15-30 BANDAGE TOPICAL AS NEEDED
Status: DISCONTINUED | OUTPATIENT
Start: 2025-01-01 | End: 2025-01-01 | Stop reason: HOSPADM

## 2025-01-01 RX ORDER — SIMVASTATIN 10 MG/1
10 TABLET, FILM COATED ORAL NIGHTLY
COMMUNITY

## 2025-01-01 RX ORDER — LANOLIN ALCOHOL/MO/W.PET/CERES
1000 CREAM (GRAM) TOPICAL DAILY
COMMUNITY

## 2025-01-01 RX ORDER — FAMOTIDINE 20 MG/1
20 TABLET, FILM COATED ORAL NIGHTLY
Status: DISCONTINUED | OUTPATIENT
Start: 2025-01-01 | End: 2025-01-01 | Stop reason: HOSPADM

## 2025-01-01 RX ORDER — FUROSEMIDE 10 MG/ML
20 INJECTION INTRAMUSCULAR; INTRAVENOUS ONCE
Status: COMPLETED | OUTPATIENT
Start: 2025-01-01 | End: 2025-01-01

## 2025-01-01 RX ORDER — HEPARIN SODIUM 10000 [USP'U]/100ML
100-4000 INJECTION, SOLUTION INTRAVENOUS
Status: DISCONTINUED | OUTPATIENT
Start: 2025-01-01 | End: 2025-01-01 | Stop reason: HOSPADM

## 2025-01-01 RX ORDER — LEVOFLOXACIN 750 MG/1
750 TABLET ORAL DAILY
COMMUNITY
End: 2025-01-01 | Stop reason: ENTERED-IN-ERROR

## 2025-01-01 RX ORDER — DIPHENHYDRAMINE HCL 25 MG
25 CAPSULE ORAL EVERY 6 HOURS PRN
Status: DISCONTINUED | OUTPATIENT
Start: 2025-01-01 | End: 2025-01-01 | Stop reason: HOSPADM

## 2025-01-01 RX ORDER — VANCOMYCIN/0.9 % SOD CHLORIDE 1.5G/250ML
1500 PLASTIC BAG, INJECTION (ML) INTRAVENOUS
Status: DISCONTINUED | OUTPATIENT
Start: 2025-01-01 | End: 2025-01-01 | Stop reason: HOSPADM

## 2025-01-01 RX ORDER — METOPROLOL TARTRATE 25 MG/1
25 TABLET, FILM COATED ORAL 2 TIMES DAILY
Status: DISCONTINUED | OUTPATIENT
Start: 2025-01-01 | End: 2025-01-01

## 2025-01-01 RX ORDER — DEXMEDETOMIDINE HYDROCHLORIDE 4 UG/ML
0-1 INJECTION, SOLUTION INTRAVENOUS
Status: DISCONTINUED | OUTPATIENT
Start: 2025-01-01 | End: 2025-01-01

## 2025-01-01 RX ORDER — MORPHINE SULFATE 2 MG/ML
2 INJECTION, SOLUTION INTRAMUSCULAR; INTRAVENOUS ONCE
Status: COMPLETED | OUTPATIENT
Start: 2025-01-01 | End: 2025-01-01

## 2025-01-01 RX ORDER — LISINOPRIL 10 MG/1
10 TABLET ORAL DAILY
Status: DISCONTINUED | OUTPATIENT
Start: 2025-01-01 | End: 2025-01-01 | Stop reason: HOSPADM

## 2025-01-01 RX ORDER — VANCOMYCIN HYDROCHLORIDE
1250
Status: DISCONTINUED | OUTPATIENT
Start: 2025-01-01 | End: 2025-01-01

## 2025-01-01 RX ORDER — VANCOMYCIN 2 GRAM/500 ML IN 0.9 % SODIUM CHLORIDE INTRAVENOUS
25 ONCE
Status: COMPLETED | OUTPATIENT
Start: 2025-01-01 | End: 2025-01-01

## 2025-01-01 RX ORDER — SPIRONOLACTONE 25 MG/1
25 TABLET ORAL DAILY
COMMUNITY

## 2025-01-01 RX ORDER — FOLIC ACID 1 MG/1
1 TABLET ORAL DAILY
Status: DISCONTINUED | OUTPATIENT
Start: 2025-01-01 | End: 2025-01-01 | Stop reason: HOSPADM

## 2025-01-01 RX ORDER — FAMOTIDINE 20 MG/1
20 TABLET, FILM COATED ORAL NIGHTLY PRN
COMMUNITY

## 2025-01-01 RX ORDER — INSULIN GLARGINE 100 [IU]/ML
12 INJECTION, SOLUTION SUBCUTANEOUS EVERY MORNING
COMMUNITY

## 2025-01-01 RX ORDER — ALBUMIN HUMAN 250 G/1000ML
25 SOLUTION INTRAVENOUS ONCE
Status: COMPLETED | OUTPATIENT
Start: 2025-01-01 | End: 2025-01-01

## 2025-01-01 RX ORDER — LORAZEPAM 2 MG/ML
1 INJECTION INTRAMUSCULAR EVERY 30 MIN PRN
Status: DISCONTINUED | OUTPATIENT
Start: 2025-01-01 | End: 2025-01-01

## 2025-01-01 RX ORDER — DIAZEPAM 10 MG/2ML
2.5 INJECTION INTRAMUSCULAR EVERY 2 HOUR PRN
Status: DISCONTINUED | OUTPATIENT
Start: 2025-01-01 | End: 2025-01-01

## 2025-01-01 RX ORDER — ALUMINUM HYDROXIDE, MAGNESIUM HYDROXIDE, AND SIMETHICONE 1200; 120; 1200 MG/30ML; MG/30ML; MG/30ML
30 SUSPENSION ORAL EVERY 4 HOURS PRN
Status: DISCONTINUED | OUTPATIENT
Start: 2025-01-01 | End: 2025-01-01 | Stop reason: HOSPADM

## 2025-01-01 RX ORDER — SODIUM CHLORIDE 1000 MG
1 TABLET, SOLUBLE MISCELLANEOUS
Status: DISCONTINUED | OUTPATIENT
Start: 2025-01-01 | End: 2025-01-01 | Stop reason: HOSPADM

## 2025-01-01 RX ORDER — BENZONATATE 100 MG/1
100 CAPSULE ORAL 3 TIMES DAILY PRN
Status: DISCONTINUED | OUTPATIENT
Start: 2025-01-01 | End: 2025-01-01 | Stop reason: HOSPADM

## 2025-01-01 RX ORDER — ACETYLCYSTEINE 200 MG/ML
200 SOLUTION ORAL; RESPIRATORY (INHALATION)
Status: DISCONTINUED | OUTPATIENT
Start: 2025-01-01 | End: 2025-01-01 | Stop reason: HOSPADM

## 2025-01-01 RX ORDER — FAMOTIDINE 20 MG/1
20 TABLET, FILM COATED ORAL 2 TIMES DAILY
Status: DISCONTINUED | OUTPATIENT
Start: 2025-01-01 | End: 2025-01-01

## 2025-01-01 RX ORDER — HYDRALAZINE HYDROCHLORIDE 20 MG/ML
10 INJECTION INTRAMUSCULAR; INTRAVENOUS EVERY 4 HOURS PRN
Status: DISCONTINUED | OUTPATIENT
Start: 2025-01-01 | End: 2025-01-01

## 2025-01-01 RX ORDER — NAPROXEN SODIUM 220 MG/1
325 TABLET, FILM COATED ORAL ONCE
Status: COMPLETED | OUTPATIENT
Start: 2025-01-01 | End: 2025-01-01

## 2025-01-01 RX ORDER — METOPROLOL TARTRATE 25 MG/1
12.5 TABLET, FILM COATED ORAL 2 TIMES DAILY
Status: DISCONTINUED | OUTPATIENT
Start: 2025-01-01 | End: 2025-01-01 | Stop reason: HOSPADM

## 2025-01-01 RX ORDER — MUPIROCIN CALCIUM 20 MG/G
CREAM TOPICAL 3 TIMES DAILY
COMMUNITY
End: 2025-01-01 | Stop reason: ENTERED-IN-ERROR

## 2025-01-01 RX ORDER — PRAVASTATIN SODIUM 20 MG/1
20 TABLET ORAL DAILY
Status: DISCONTINUED | OUTPATIENT
Start: 2025-01-01 | End: 2025-01-01 | Stop reason: HOSPADM

## 2025-01-01 RX ORDER — DIAZEPAM 10 MG/2ML
2 INJECTION INTRAMUSCULAR
Status: DISCONTINUED | OUTPATIENT
Start: 2025-01-01 | End: 2025-01-01

## 2025-01-01 RX ORDER — DOCUSATE SODIUM 100 MG/1
100 CAPSULE, LIQUID FILLED ORAL 2 TIMES DAILY
Status: DISCONTINUED | OUTPATIENT
Start: 2025-01-01 | End: 2025-01-01 | Stop reason: HOSPADM

## 2025-01-01 RX ORDER — POLYETHYLENE GLYCOL 3350 17 G/17G
17 POWDER, FOR SOLUTION ORAL DAILY PRN
Status: DISCONTINUED | OUTPATIENT
Start: 2025-01-01 | End: 2025-01-01 | Stop reason: HOSPADM

## 2025-01-01 RX ORDER — GABAPENTIN 300 MG/1
300 CAPSULE ORAL 3 TIMES DAILY
Status: DISCONTINUED | OUTPATIENT
Start: 2025-01-01 | End: 2025-01-01

## 2025-01-01 RX ORDER — IOPAMIDOL 755 MG/ML
100 INJECTION, SOLUTION INTRAVASCULAR
Status: COMPLETED | OUTPATIENT
Start: 2025-01-01 | End: 2025-01-01

## 2025-01-01 RX ORDER — DEXTROSE 40 %
15-30 GEL (GRAM) ORAL AS NEEDED
Status: DISCONTINUED | OUTPATIENT
Start: 2025-01-01 | End: 2025-01-01 | Stop reason: HOSPADM

## 2025-01-01 RX ORDER — DIAZEPAM 10 MG/2ML
5 INJECTION INTRAMUSCULAR EVERY 30 MIN PRN
Status: DISCONTINUED | OUTPATIENT
Start: 2025-01-01 | End: 2025-01-01

## 2025-01-01 RX ORDER — GABAPENTIN 300 MG/1
300 CAPSULE ORAL NIGHTLY
Status: DISCONTINUED | OUTPATIENT
Start: 2025-01-01 | End: 2025-01-01 | Stop reason: HOSPADM

## 2025-01-01 RX ADMIN — GABAPENTIN 300 MG: 300 CAPSULE ORAL at 20:16

## 2025-01-01 RX ADMIN — FAMOTIDINE 20 MG: 20 TABLET, FILM COATED ORAL at 21:39

## 2025-01-01 RX ADMIN — INSULIN GLARGINE 12 UNITS: 100 INJECTION, SOLUTION SUBCUTANEOUS at 12:23

## 2025-01-01 RX ADMIN — HEPARIN SODIUM 1350 UNITS/HR: 10000 INJECTION, SOLUTION INTRAVENOUS at 05:44

## 2025-01-01 RX ADMIN — MOMETASONE FUROATE AND FORMOTEROL FUMARATE DIHYDRATE 2 PUFF: 200; 5 AEROSOL RESPIRATORY (INHALATION) at 07:55

## 2025-01-01 RX ADMIN — MOMETASONE FUROATE AND FORMOTEROL FUMARATE DIHYDRATE 2 PUFF: 200; 5 AEROSOL RESPIRATORY (INHALATION) at 20:09

## 2025-01-01 RX ADMIN — IPRATROPIUM BROMIDE AND ALBUTEROL SULFATE 3 ML: .5; 3 SOLUTION RESPIRATORY (INHALATION) at 02:34

## 2025-01-01 RX ADMIN — DIAZEPAM 2.5 MG: 10 INJECTION, SOLUTION INTRAMUSCULAR; INTRAVENOUS at 14:04

## 2025-01-01 RX ADMIN — DIAZEPAM 2.5 MG: 10 INJECTION, SOLUTION INTRAMUSCULAR; INTRAVENOUS at 01:47

## 2025-01-01 RX ADMIN — FOLIC ACID 1 MG: 1 TABLET ORAL at 08:11

## 2025-01-01 RX ADMIN — SODIUM CHLORIDE 1 G: 1 TABLET ORAL at 08:11

## 2025-01-01 RX ADMIN — CYANOCOBALAMIN TAB 500 MCG 1000 MCG: 500 TAB at 08:45

## 2025-01-01 RX ADMIN — ALBUMIN (HUMAN) 50 G: 12.5 SOLUTION INTRAVENOUS at 19:37

## 2025-01-01 RX ADMIN — IPRATROPIUM BROMIDE AND ALBUTEROL SULFATE 3 ML: .5; 3 SOLUTION RESPIRATORY (INHALATION) at 23:52

## 2025-01-01 RX ADMIN — AZITHROMYCIN MONOHYDRATE 500 MG: 500 INJECTION, POWDER, LYOPHILIZED, FOR SOLUTION INTRAVENOUS at 15:14

## 2025-01-01 RX ADMIN — IPRATROPIUM BROMIDE AND ALBUTEROL SULFATE 3 ML: .5; 3 SOLUTION RESPIRATORY (INHALATION) at 07:56

## 2025-01-01 RX ADMIN — IPRATROPIUM BROMIDE AND ALBUTEROL SULFATE 3 ML: .5; 3 SOLUTION RESPIRATORY (INHALATION) at 14:39

## 2025-01-01 RX ADMIN — DOCUSATE SODIUM 100 MG: 100 CAPSULE, LIQUID FILLED ORAL at 09:08

## 2025-01-01 RX ADMIN — FOLIC ACID 1 MG: 1 TABLET ORAL at 08:45

## 2025-01-01 RX ADMIN — IPRATROPIUM BROMIDE AND ALBUTEROL SULFATE 3 ML: .5; 3 SOLUTION RESPIRATORY (INHALATION) at 14:53

## 2025-01-01 RX ADMIN — IPRATROPIUM BROMIDE AND ALBUTEROL SULFATE 3 ML: .5; 3 SOLUTION RESPIRATORY (INHALATION) at 19:07

## 2025-01-01 RX ADMIN — IPRATROPIUM BROMIDE AND ALBUTEROL SULFATE 3 ML: .5; 3 SOLUTION RESPIRATORY (INHALATION) at 03:32

## 2025-01-01 RX ADMIN — SODIUM CHLORIDE 1 G: 1 TABLET ORAL at 17:27

## 2025-01-01 RX ADMIN — WATER 1250 MG: 1 INJECTION INTRAMUSCULAR; INTRAVENOUS; SUBCUTANEOUS at 20:16

## 2025-01-01 RX ADMIN — DEXMEDETOMIDINE 1 MCG/KG/HR: 100 INJECTION, SOLUTION INTRAVENOUS at 15:34

## 2025-01-01 RX ADMIN — SODIUM CHLORIDE 1 G: 1 TABLET ORAL at 11:56

## 2025-01-01 RX ADMIN — DIAZEPAM 2.5 MG: 10 INJECTION, SOLUTION INTRAMUSCULAR; INTRAVENOUS at 19:46

## 2025-01-01 RX ADMIN — LISINOPRIL 10 MG: 10 TABLET ORAL at 08:54

## 2025-01-01 RX ADMIN — ACETAMINOPHEN 650 MG: 325 TABLET ORAL at 19:43

## 2025-01-01 RX ADMIN — SODIUM CHLORIDE 1 G: 1 TABLET ORAL at 12:32

## 2025-01-01 RX ADMIN — CEFTRIAXONE SODIUM 1 G: 1 INJECTION, POWDER, FOR SOLUTION INTRAMUSCULAR; INTRAVENOUS at 15:15

## 2025-01-01 RX ADMIN — ACETYLCYSTEINE 200 MG: 200 INHALANT RESPIRATORY (INHALATION) at 19:53

## 2025-01-01 RX ADMIN — SODIUM CHLORIDE 500 ML: 9 INJECTION, SOLUTION INTRAVENOUS at 14:50

## 2025-01-01 RX ADMIN — WATER 2000 MG: 1 INJECTION INTRAMUSCULAR; INTRAVENOUS; SUBCUTANEOUS at 09:12

## 2025-01-01 RX ADMIN — BENZONATATE 100 MG: 100 CAPSULE ORAL at 19:42

## 2025-01-01 RX ADMIN — GABAPENTIN 300 MG: 300 CAPSULE ORAL at 21:25

## 2025-01-01 RX ADMIN — METHYLPREDNISOLONE SODIUM SUCCINATE 40 MG: 40 INJECTION, POWDER, FOR SOLUTION INTRAMUSCULAR; INTRAVENOUS at 12:23

## 2025-01-01 RX ADMIN — FAMOTIDINE 20 MG: 20 TABLET, FILM COATED ORAL at 21:25

## 2025-01-01 RX ADMIN — METOPROLOL TARTRATE 12.5 MG: 25 TABLET, FILM COATED ORAL at 19:42

## 2025-01-01 RX ADMIN — METOPROLOL TARTRATE 12.5 MG: 25 TABLET, FILM COATED ORAL at 09:09

## 2025-01-01 RX ADMIN — ALBUMIN (HUMAN) 25 G: 12.5 SOLUTION INTRAVENOUS at 17:20

## 2025-01-01 RX ADMIN — IPRATROPIUM BROMIDE AND ALBUTEROL SULFATE 3 ML: .5; 3 SOLUTION RESPIRATORY (INHALATION) at 19:52

## 2025-01-01 RX ADMIN — FAMOTIDINE 20 MG: 20 TABLET, FILM COATED ORAL at 20:16

## 2025-01-01 RX ADMIN — WATER 1500 MG: 1 INJECTION INTRAMUSCULAR; INTRAVENOUS; SUBCUTANEOUS at 09:20

## 2025-01-01 RX ADMIN — PANTOPRAZOLE SODIUM 40 MG: 40 INJECTION, POWDER, FOR SOLUTION INTRAVENOUS at 08:11

## 2025-01-01 RX ADMIN — INSULIN LISPRO 8 UNITS: 100 INJECTION, SOLUTION INTRAVENOUS; SUBCUTANEOUS at 09:14

## 2025-01-01 RX ADMIN — TRAMADOL HYDROCHLORIDE 50 MG: 50 TABLET, COATED ORAL at 10:42

## 2025-01-01 RX ADMIN — THIAMINE HYDROCHLORIDE 200 MG: 100 INJECTION, SOLUTION INTRAMUSCULAR; INTRAVENOUS at 10:39

## 2025-01-01 RX ADMIN — SODIUM CHLORIDE 500 ML: 9 INJECTION, SOLUTION INTRAVENOUS at 12:43

## 2025-01-01 RX ADMIN — ACETYLCYSTEINE 200 MG: 200 INHALANT RESPIRATORY (INHALATION) at 07:52

## 2025-01-01 RX ADMIN — IPRATROPIUM BROMIDE AND ALBUTEROL SULFATE 3 ML: .5; 3 SOLUTION RESPIRATORY (INHALATION) at 02:39

## 2025-01-01 RX ADMIN — ACETAMINOPHEN 650 MG: 325 TABLET ORAL at 10:04

## 2025-01-01 RX ADMIN — MOMETASONE FUROATE AND FORMOTEROL FUMARATE DIHYDRATE 2 PUFF: 200; 5 AEROSOL RESPIRATORY (INHALATION) at 07:54

## 2025-01-01 RX ADMIN — PANTOPRAZOLE SODIUM 40 MG: 40 INJECTION, POWDER, FOR SOLUTION INTRAVENOUS at 08:52

## 2025-01-01 RX ADMIN — INSULIN LISPRO 8 UNITS: 100 INJECTION, SOLUTION INTRAVENOUS; SUBCUTANEOUS at 12:46

## 2025-01-01 RX ADMIN — MORPHINE SULFATE 2 MG: 2 INJECTION, SOLUTION INTRAMUSCULAR; INTRAVENOUS at 13:16

## 2025-01-01 RX ADMIN — LORAZEPAM 1 MG: 2 INJECTION INTRAMUSCULAR; INTRAVENOUS at 17:29

## 2025-01-01 RX ADMIN — GUAIFENESIN 600 MG: 600 TABLET, EXTENDED RELEASE ORAL at 09:09

## 2025-01-01 RX ADMIN — METOPROLOL TARTRATE 12.5 MG: 25 TABLET, FILM COATED ORAL at 12:05

## 2025-01-01 RX ADMIN — GUAIFENESIN 600 MG: 600 TABLET, EXTENDED RELEASE ORAL at 08:54

## 2025-01-01 RX ADMIN — METHYLPREDNISOLONE SODIUM SUCCINATE 40 MG: 40 INJECTION, POWDER, FOR SOLUTION INTRAMUSCULAR; INTRAVENOUS at 11:21

## 2025-01-01 RX ADMIN — TIOTROPIUM BROMIDE INHALATION SPRAY 2 PUFF: 3.12 SPRAY, METERED RESPIRATORY (INHALATION) at 07:55

## 2025-01-01 RX ADMIN — THIAMINE HCL TAB 100 MG 100 MG: 100 TAB at 09:08

## 2025-01-01 RX ADMIN — HEPARIN SODIUM 1450 UNITS/HR: 10000 INJECTION, SOLUTION INTRAVENOUS at 03:34

## 2025-01-01 RX ADMIN — THIAMINE HYDROCHLORIDE 200 MG: 100 INJECTION, SOLUTION INTRAMUSCULAR; INTRAVENOUS at 19:01

## 2025-01-01 RX ADMIN — LISINOPRIL 10 MG: 10 TABLET ORAL at 09:09

## 2025-01-01 RX ADMIN — HEPARIN SODIUM 1350 UNITS/HR: 10000 INJECTION, SOLUTION INTRAVENOUS at 10:36

## 2025-01-01 RX ADMIN — SODIUM CHLORIDE 1 G: 1 TABLET ORAL at 08:54

## 2025-01-01 RX ADMIN — INSULIN LISPRO 4 UNITS: 100 INJECTION, SOLUTION INTRAVENOUS; SUBCUTANEOUS at 08:55

## 2025-01-01 RX ADMIN — AZITHROMYCIN MONOHYDRATE 500 MG: 500 INJECTION, POWDER, LYOPHILIZED, FOR SOLUTION INTRAVENOUS at 16:02

## 2025-01-01 RX ADMIN — THIAMINE HYDROCHLORIDE 200 MG: 100 INJECTION, SOLUTION INTRAMUSCULAR; INTRAVENOUS at 02:01

## 2025-01-01 RX ADMIN — SODIUM CHLORIDE 1 G: 1 TABLET ORAL at 16:36

## 2025-01-01 RX ADMIN — GABAPENTIN 300 MG: 300 CAPSULE ORAL at 21:39

## 2025-01-01 RX ADMIN — INSULIN LISPRO 8 UNITS: 100 INJECTION, SOLUTION INTRAVENOUS; SUBCUTANEOUS at 12:32

## 2025-01-01 RX ADMIN — ASPIRIN 81 MG CHEWABLE TABLET 324 MG: 81 TABLET CHEWABLE at 16:04

## 2025-01-01 RX ADMIN — METOPROLOL TARTRATE 12.5 MG: 25 TABLET, FILM COATED ORAL at 08:54

## 2025-01-01 RX ADMIN — THIAMINE HYDROCHLORIDE 200 MG: 100 INJECTION, SOLUTION INTRAMUSCULAR; INTRAVENOUS at 09:33

## 2025-01-01 RX ADMIN — HEPARIN SODIUM 1550 UNITS/HR: 10000 INJECTION, SOLUTION INTRAVENOUS at 19:31

## 2025-01-01 RX ADMIN — DEXMEDETOMIDINE 0.2 MCG/KG/HR: 100 INJECTION, SOLUTION INTRAVENOUS at 09:57

## 2025-01-01 RX ADMIN — PRAVASTATIN SODIUM 20 MG: 20 TABLET ORAL at 09:08

## 2025-01-01 RX ADMIN — GUAIFENESIN 600 MG: 600 TABLET, EXTENDED RELEASE ORAL at 08:45

## 2025-01-01 RX ADMIN — GUAIFENESIN 600 MG: 600 TABLET, EXTENDED RELEASE ORAL at 19:43

## 2025-01-01 RX ADMIN — MOMETASONE FUROATE AND FORMOTEROL FUMARATE DIHYDRATE 2 PUFF: 200; 5 AEROSOL RESPIRATORY (INHALATION) at 19:53

## 2025-01-01 RX ADMIN — INSULIN LISPRO 6 UNITS: 100 INJECTION, SOLUTION INTRAVENOUS; SUBCUTANEOUS at 12:02

## 2025-01-01 RX ADMIN — SODIUM CHLORIDE 1 G: 1 TABLET ORAL at 08:46

## 2025-01-01 RX ADMIN — DIAZEPAM 5 MG: 5 INJECTION INTRAMUSCULAR; INTRAVENOUS at 12:09

## 2025-01-01 RX ADMIN — IPRATROPIUM BROMIDE AND ALBUTEROL SULFATE 3 ML: .5; 3 SOLUTION RESPIRATORY (INHALATION) at 20:09

## 2025-01-01 RX ADMIN — IOPAMIDOL 100 ML: 755 INJECTION, SOLUTION INTRAVENOUS at 16:57

## 2025-01-01 RX ADMIN — FOLIC ACID 1 MG: 1 TABLET ORAL at 19:00

## 2025-01-01 RX ADMIN — THIAMINE HYDROCHLORIDE 200 MG: 100 INJECTION, SOLUTION INTRAMUSCULAR; INTRAVENOUS at 01:30

## 2025-01-01 RX ADMIN — SODIUM CHLORIDE 1 G: 1 TABLET ORAL at 12:18

## 2025-01-01 RX ADMIN — TIOTROPIUM BROMIDE INHALATION SPRAY 2 PUFF: 3.12 SPRAY, METERED RESPIRATORY (INHALATION) at 16:35

## 2025-01-01 RX ADMIN — AZITHROMYCIN MONOHYDRATE 500 MG: 500 INJECTION, POWDER, LYOPHILIZED, FOR SOLUTION INTRAVENOUS at 16:36

## 2025-01-01 RX ADMIN — FUROSEMIDE 20 MG: 10 INJECTION, SOLUTION INTRAMUSCULAR; INTRAVENOUS at 12:18

## 2025-01-01 RX ADMIN — IPRATROPIUM BROMIDE AND ALBUTEROL SULFATE 3 ML: .5; 3 SOLUTION RESPIRATORY (INHALATION) at 07:59

## 2025-01-01 RX ADMIN — GUAIFENESIN 600 MG: 600 TABLET, EXTENDED RELEASE ORAL at 20:00

## 2025-01-01 RX ADMIN — LISINOPRIL 10 MG: 10 TABLET ORAL at 08:46

## 2025-01-01 RX ADMIN — TIOTROPIUM BROMIDE INHALATION SPRAY 2 PUFF: 3.12 SPRAY, METERED RESPIRATORY (INHALATION) at 07:59

## 2025-01-01 RX ADMIN — METHYLPREDNISOLONE SODIUM SUCCINATE 40 MG: 40 INJECTION, POWDER, FOR SOLUTION INTRAMUSCULAR; INTRAVENOUS at 22:22

## 2025-01-01 RX ADMIN — INSULIN GLARGINE 12 UNITS: 100 INJECTION, SOLUTION SUBCUTANEOUS at 21:39

## 2025-01-01 RX ADMIN — INSULIN GLARGINE 12 UNITS: 100 INJECTION, SOLUTION SUBCUTANEOUS at 21:46

## 2025-01-01 RX ADMIN — CEFTRIAXONE SODIUM 1 G: 1 INJECTION, POWDER, FOR SOLUTION INTRAMUSCULAR; INTRAVENOUS at 15:16

## 2025-01-01 RX ADMIN — THIAMINE HYDROCHLORIDE 200 MG: 100 INJECTION, SOLUTION INTRAMUSCULAR; INTRAVENOUS at 17:22

## 2025-01-01 RX ADMIN — CEFTRIAXONE SODIUM 1 G: 1 INJECTION, POWDER, FOR SOLUTION INTRAMUSCULAR; INTRAVENOUS at 14:27

## 2025-01-01 RX ADMIN — PRAVASTATIN SODIUM 20 MG: 20 TABLET ORAL at 08:54

## 2025-01-01 RX ADMIN — DIAZEPAM 2.5 MG: 10 INJECTION, SOLUTION INTRAMUSCULAR; INTRAVENOUS at 00:44

## 2025-01-01 RX ADMIN — CYANOCOBALAMIN TAB 500 MCG 1000 MCG: 500 TAB at 09:08

## 2025-01-01 RX ADMIN — CYANOCOBALAMIN TAB 500 MCG 1000 MCG: 500 TAB at 11:56

## 2025-01-01 RX ADMIN — GUAIFENESIN 600 MG: 600 TABLET, EXTENDED RELEASE ORAL at 21:25

## 2025-01-01 RX ADMIN — FUROSEMIDE 20 MG: 10 INJECTION, SOLUTION INTRAMUSCULAR; INTRAVENOUS at 17:22

## 2025-01-01 RX ADMIN — HEPARIN SODIUM 1600 UNITS/HR: 10000 INJECTION, SOLUTION INTRAVENOUS at 17:46

## 2025-01-01 RX ADMIN — INSULIN LISPRO 6 UNITS: 100 INJECTION, SOLUTION INTRAVENOUS; SUBCUTANEOUS at 18:04

## 2025-01-01 RX ADMIN — THIAMINE HYDROCHLORIDE 200 MG: 100 INJECTION, SOLUTION INTRAMUSCULAR; INTRAVENOUS at 21:25

## 2025-01-01 RX ADMIN — PANTOPRAZOLE SODIUM 40 MG: 40 INJECTION, POWDER, FOR SOLUTION INTRAVENOUS at 08:54

## 2025-01-01 RX ADMIN — CEFTRIAXONE SODIUM 1 G: 1 INJECTION, POWDER, FOR SOLUTION INTRAMUSCULAR; INTRAVENOUS at 15:49

## 2025-01-01 RX ADMIN — IPRATROPIUM BROMIDE AND ALBUTEROL SULFATE 3 ML: .5; 3 SOLUTION RESPIRATORY (INHALATION) at 14:37

## 2025-01-01 RX ADMIN — WATER 1250 MG: 1 INJECTION INTRAMUSCULAR; INTRAVENOUS; SUBCUTANEOUS at 21:41

## 2025-01-01 RX ADMIN — TIOTROPIUM BROMIDE INHALATION SPRAY 2 PUFF: 3.12 SPRAY, METERED RESPIRATORY (INHALATION) at 07:54

## 2025-01-01 RX ADMIN — FOLIC ACID 1 MG: 1 TABLET ORAL at 08:54

## 2025-01-01 RX ADMIN — ACETYLCYSTEINE 200 MG: 200 INHALANT RESPIRATORY (INHALATION) at 14:37

## 2025-01-01 RX ADMIN — AZITHROMYCIN MONOHYDRATE 500 MG: 500 INJECTION, POWDER, LYOPHILIZED, FOR SOLUTION INTRAVENOUS at 16:40

## 2025-01-01 RX ADMIN — MOMETASONE FUROATE AND FORMOTEROL FUMARATE DIHYDRATE 2 PUFF: 200; 5 AEROSOL RESPIRATORY (INHALATION) at 20:11

## 2025-01-01 RX ADMIN — ACETAMINOPHEN 650 MG: 325 TABLET ORAL at 08:12

## 2025-01-01 RX ADMIN — THIAMINE HYDROCHLORIDE 200 MG: 100 INJECTION, SOLUTION INTRAMUSCULAR; INTRAVENOUS at 10:42

## 2025-01-01 RX ADMIN — FOLIC ACID 1 MG: 1 TABLET ORAL at 09:08

## 2025-01-01 RX ADMIN — IPRATROPIUM BROMIDE AND ALBUTEROL SULFATE 3 ML: .5; 3 SOLUTION RESPIRATORY (INHALATION) at 07:52

## 2025-01-01 RX ADMIN — WATER 1250 MG: 1 INJECTION INTRAMUSCULAR; INTRAVENOUS; SUBCUTANEOUS at 09:40

## 2025-01-01 RX ADMIN — CYANOCOBALAMIN TAB 500 MCG 1000 MCG: 500 TAB at 08:54

## 2025-01-01 RX ADMIN — PANTOPRAZOLE SODIUM 40 MG: 40 INJECTION, POWDER, FOR SOLUTION INTRAVENOUS at 19:13

## 2025-01-01 RX ADMIN — INSULIN LISPRO 2 UNITS: 100 INJECTION, SOLUTION INTRAVENOUS; SUBCUTANEOUS at 08:40

## 2025-01-01 RX ADMIN — SODIUM CHLORIDE 500 ML: 9 INJECTION, SOLUTION INTRAVENOUS at 12:42

## 2025-01-01 RX ADMIN — AZITHROMYCIN MONOHYDRATE 500 MG: 500 INJECTION, POWDER, LYOPHILIZED, FOR SOLUTION INTRAVENOUS at 15:16

## 2025-01-01 RX ADMIN — SODIUM CHLORIDE 1 G: 1 TABLET ORAL at 17:42

## 2025-01-01 RX ADMIN — POLYETHYLENE GLYCOL 3350 17 G: 17 POWDER, FOR SOLUTION ORAL at 17:42

## 2025-01-01 RX ADMIN — SODIUM CHLORIDE: 9 INJECTION INTRAMUSCULAR; INTRAVENOUS; SUBCUTANEOUS at 10:22

## 2025-01-01 RX ADMIN — INSULIN LISPRO 6 UNITS: 100 INJECTION, SOLUTION INTRAVENOUS; SUBCUTANEOUS at 09:39

## 2025-01-01 RX ADMIN — METOPROLOL TARTRATE 12.5 MG: 25 TABLET, FILM COATED ORAL at 21:00

## 2025-01-01 RX ADMIN — HEPARIN SODIUM 1650 UNITS/HR: 10000 INJECTION, SOLUTION INTRAVENOUS at 13:16

## 2025-01-01 RX ADMIN — METOPROLOL TARTRATE 12.5 MG: 25 TABLET, FILM COATED ORAL at 08:49

## 2025-01-01 RX ADMIN — INSULIN LISPRO 6 UNITS: 100 INJECTION, SOLUTION INTRAVENOUS; SUBCUTANEOUS at 17:42

## 2025-01-01 RX ADMIN — SODIUM CHLORIDE 1 G: 1 TABLET ORAL at 09:08

## 2025-01-01 RX ADMIN — INSULIN LISPRO 4 UNITS: 100 INJECTION, SOLUTION INTRAVENOUS; SUBCUTANEOUS at 12:17

## 2025-01-01 RX ADMIN — DOCUSATE SODIUM 100 MG: 100 CAPSULE, LIQUID FILLED ORAL at 20:00

## 2025-01-01 RX ADMIN — CEFTRIAXONE SODIUM 1 G: 1 INJECTION, POWDER, FOR SOLUTION INTRAMUSCULAR; INTRAVENOUS at 16:37

## 2025-01-01 RX ADMIN — DOCUSATE SODIUM 100 MG: 100 CAPSULE, LIQUID FILLED ORAL at 10:04

## 2025-01-01 RX ADMIN — DIAZEPAM 2 MG: 10 INJECTION, SOLUTION INTRAMUSCULAR; INTRAVENOUS at 11:45

## 2025-01-01 RX ADMIN — PRAVASTATIN SODIUM 20 MG: 20 TABLET ORAL at 08:46

## 2025-01-01 RX ADMIN — PANTOPRAZOLE SODIUM 40 MG: 40 INJECTION, POWDER, FOR SOLUTION INTRAVENOUS at 09:13

## 2025-01-01 RX ADMIN — MOMETASONE FUROATE AND FORMOTEROL FUMARATE DIHYDRATE 2 PUFF: 200; 5 AEROSOL RESPIRATORY (INHALATION) at 07:59

## 2025-01-01 RX ADMIN — PRAVASTATIN SODIUM 20 MG: 20 TABLET ORAL at 11:56

## 2025-01-01 RX ADMIN — MOMETASONE FUROATE AND FORMOTEROL FUMARATE DIHYDRATE 2 PUFF: 200; 5 AEROSOL RESPIRATORY (INHALATION) at 16:35

## 2025-01-01 ASSESSMENT — COGNITIVE AND FUNCTIONAL STATUS - GENERAL
MOVING TO AND FROM BED TO CHAIR: 1 - TOTAL
WALKING IN HOSPITAL ROOM: 1 - TOTAL
CLIMB 3 TO 5 STEPS WITH RAILING: 1 - TOTAL
STANDING UP FROM CHAIR USING ARMS: 1 - TOTAL
CLIMB 3 TO 5 STEPS WITH RAILING: 1 - TOTAL
STANDING UP FROM CHAIR USING ARMS: 1 - TOTAL
MOVING TO AND FROM BED TO CHAIR: 1 - TOTAL
STANDING UP FROM CHAIR USING ARMS: 1 - TOTAL
STANDING UP FROM CHAIR USING ARMS: 1 - TOTAL
CLIMB 3 TO 5 STEPS WITH RAILING: 1 - TOTAL
WALKING IN HOSPITAL ROOM: 1 - TOTAL
WALKING IN HOSPITAL ROOM: 1 - TOTAL
STANDING UP FROM CHAIR USING ARMS: 1 - TOTAL
CLIMB 3 TO 5 STEPS WITH RAILING: 1 - TOTAL
WALKING IN HOSPITAL ROOM: 1 - TOTAL
MOVING TO AND FROM BED TO CHAIR: 2 - A LOT
STANDING UP FROM CHAIR USING ARMS: 1 - TOTAL
MOVING TO AND FROM BED TO CHAIR: 2 - A LOT
WALKING IN HOSPITAL ROOM: 1 - TOTAL
WALKING IN HOSPITAL ROOM: 1 - TOTAL
MOVING TO AND FROM BED TO CHAIR: 2 - A LOT
MOVING TO AND FROM BED TO CHAIR: 1 - TOTAL

## 2025-01-01 ASSESSMENT — ENCOUNTER SYMPTOMS
FEVER: 0
WEAKNESS: 0
COUGH: 0
ACTIVITY CHANGE: 0
AGITATION: 0
DIARRHEA: 0
NECK PAIN: 0
HEADACHES: 0
SEIZURES: 0
CHILLS: 1
SPEECH DIFFICULTY: 0
COLOR CHANGE: 0
NAUSEA: 0
ABDOMINAL PAIN: 0
BACK PAIN: 0
SHORTNESS OF BREATH: 1
VOMITING: 0
DIFFICULTY URINATING: 1

## 2025-06-01 PROBLEM — J18.9 PNEUMONIA OF BOTH LOWER LOBES DUE TO INFECTIOUS ORGANISM: Status: ACTIVE | Noted: 2025-01-01

## 2025-06-01 PROBLEM — A41.9: Status: ACTIVE | Noted: 2025-01-01

## 2025-06-01 PROBLEM — A41.9 SEPSIS (CMS/HCC): Status: ACTIVE | Noted: 2025-01-01

## 2025-06-01 PROBLEM — R65.20: Status: ACTIVE | Noted: 2025-01-01

## 2025-06-01 PROBLEM — A40.9 SEPSIS DUE TO STREPTOCOCCUS SPECIES WITHOUT ACUTE ORGAN DYSFUNCTION (CMS/HCC): Status: ACTIVE | Noted: 2025-01-01

## 2025-06-01 PROBLEM — N17.1: Status: ACTIVE | Noted: 2025-01-01

## 2025-06-01 PROBLEM — R16.0 LIVER MASS: Status: ACTIVE | Noted: 2025-01-01

## 2025-06-01 NOTE — ED PROVIDER NOTES
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  Presents to the emergency department for an evaluation of shortness of breath.  Patient reports he is chronically short of breath however the past 3 days he has had worsening shortness of breath.  States that he is not able to walk a few feet without needing to stop.  Associated symptoms were chills. Patient also notes that he had difficulty urinating and did not urinate all day yesterday however this morning was able to urinate.  Patient normally lives in [place] however is visiting notes that he did not bring his insulin and has not had his insulin the past 3 days.  Patient also admits to alcohol use as well as a current every day smoker.  Denies chest pain, abdominal pain, pain with urination, nausea/vomiting, lower leg swelling or pain, or any other concerns at this time.  Recent traveled from [place].          Patient History   PAST HISTORY     Reviewed from Nursing Triage:       Past Medical History:   Diagnosis Date    A-fib (CMS/[facility])     Anemia     CHF (congestive heart failure) (CMS/[facility])     COPD (chronic obstructive pulmonary disease) (CMS/[facility])     Hyperlipidemia     Kidney disease     Type 2 diabetes mellitus (CMS/[facility])        No past surgical history on file.    No family history on file.    Social History     Tobacco Use    Smoking status: Some Days     Types: Cigars    Smokeless tobacco: Never   Substance Use Topics    Alcohol use: Never    Drug use:  Never         Review of Systems   REVIEW OF SYSTEMS     Review of Systems   Constitutional:  Positive for chills. Negative for activity change and fever.   Respiratory:  Positive for shortness of breath. Negative for cough.    Cardiovascular:  Negative for chest pain and leg swelling.   Gastrointestinal:  Negative for abdominal pain, diarrhea, nausea and vomiting.   Genitourinary:  Positive for difficulty urinating (now resolved).   Musculoskeletal:  Negative for back pain and neck pain.   Skin:  Negative for color change.   Neurological:  Negative for seizures, syncope, speech difficulty, weakness and headaches.   Psychiatric/Behavioral:  Negative for agitation and behavioral problems.          VITALS     ED Vitals      Date/Time Temp Pulse Resp BP SpO2 [facility]   06/01/25 1353 36.3 °C (97.3 °F) 96 28 82/56 -- AG                         Physical Exam   PHYSICAL EXAM     Physical Exam  Vitals and nursing note reviewed.   Constitutional:       Appearance: He is well-developed.   HENT:      Head: Normocephalic and atraumatic.   Eyes:      Extraocular Movements: Extraocular movements intact.      Conjunctiva/sclera: Conjunctivae normal.   Cardiovascular:      Rate and Rhythm: Normal rate. Rhythm irregular.   Pulmonary:      Effort: Pulmonary effort is normal. Tachypnea present.      Breath sounds: Examination of the right-lower field reveals decreased breath sounds. Examination of the left-lower field reveals decreased breath sounds. Decreased breath sounds present.   Abdominal:      General: There is no distension.      Palpations: Abdomen is soft. There is no mass.      Tenderness: There is no abdominal tenderness.   Musculoskeletal:         General: No tenderness or deformity. Normal range of motion.      Cervical back: Normal range of motion.      Right lower leg: No edema.      Left lower leg: No edema.   Skin:     General: Skin is warm and dry.      Coloration: Skin is pale.   Neurological:      Mental Status: He is  alert and oriented to person, place, and time. Mental status is at baseline.   Psychiatric:         Behavior: Behavior normal.           PROCEDURES     Procedures     DATA     Results       Procedure Component Value Units Date/Time    CBC and differential [[id number]]  (Abnormal) Collected: 06/01/25 1418    Specimen: Blood, Venous Updated: 06/01/25 1428     WBC 25.23 K/uL      RBC 3.11 M/uL      Hemoglobin 10.8 g/dL      Hematocrit 33.2 %      .8 fL      MCH 34.7 pg      MCHC 32.5 g/dL      RDW 17.5 %      Platelets 149 K/uL      MPV 8.4 fL     Comprehensive metabolic panel [[id number]] Collected: 06/01/25 1418    Specimen: Blood, Venous Updated: 06/01/25 1424    HS Troponin I (with 2 hour reflex) [[id number]] Collected: 06/01/25 1418    Specimen: Blood, Venous Updated: 06/01/25 1424    RAINBOW LT BLUE [[id number]] Collected: 06/01/25 1418    Specimen: Blood, Venous Updated: 06/01/25 1424    SARS-COV-2 (COVID-19)/ FLU A/B, AND RSV, PCR Nasopharynx [[id number]] Collected: 06/01/25 1419    Specimen: Nasopharyngeal Swab from Nasopharynx Updated: 06/01/25 1423    Closplint Draw Panel [[id number]] Collected: 06/01/25 1418    Specimen: Blood, Venous Updated: 06/01/25 1423    Narrative:      The following orders were created for panel order Closplint Draw Panel.  Procedure                               Abnormality         Status                     ---------                               -----------         ------                     RAINBOW PINK[[id number]]                                     In process                 RAINBOW LT BLUE[[id number]]                                  In process                   Please view results for these tests on the individual orders.    RAINBOW PINK [[id number]] Collected: 06/01/25 1418    Specimen: Blood, Venous Updated: 06/01/25 1423            Imaging Results    None         ECG 12 lead          Scoring tools                                  ED Course & MDM   MDM / ED COURSE / CLINICAL  IMPRESSION / DISPO     Medical Decision Making  Impression: 79-year-old male presenting emergency department for an evaluation of shortness of breath.  Plan: CBC, CMP, troponin with reflex, chest x-ray, EKGt. No evidence of volume overload on exam.  EKG without signs of active ischemia or STEMI.    Dispo: Admitted to the hospital for further evaluation, treatment, and management.  Patient agreeable amenable plan.    Problems Addressed:  Acute on chronic congestive heart failure, unspecified heart failure type (CMS/HCC): acute illness or injury  Elevated troponin: acute illness or injury  Hyponatremia: acute illness or injury  Liver lesion: acute illness or injury  Lung consolidation (CMS/HCC): acute illness or injury  Pneumonia of both lower lobes due to infectious organism: acute illness or injury  Shortness of breath: acute illness or injury    Amount and/or Complexity of Data Reviewed  Labs: ordered. Decision-making details documented in ED Course.  Radiology: ordered and independent interpretation performed. Decision-making details documented in ED Course.  ECG/medicine tests: ordered and independent interpretation performed.    Risk  OTC drugs.  Prescription drug management.  Decision regarding hospitalization.        ED Course as of 06/01/25 1725   Sun Jun 01, 2025   1434 D/w Dr. Zavala: I transmitted EKG's -I repeated the first EKG and the inferior lead was probably more artifact than real.  He is not concerned for acute STEMI.  We will continue to workup medically. [SB]   1437 WBC(!): 25.23  Sepsis labs ordered.  Questionable sepsis.  500mL of normal saline ordered at this time due to patient's history of CHF.  IV antibiotics ordered [CF]   1450 Patient is hemodynamically stable, alert, tachypneic however resting comfortably during evaluation.  The care and work-up of this patient was discussed with attending physician Dr. Castellon   [CF]   5564 The patient's GFR is over 30.  We are going to CAT scan his  chest to rule out PE, dissection, aneurysm or other life-threatening abnormalities.  Nurses are unable to get an accurate O2 sat.  They are still working on it. [SB]   1458 X-RAY CHEST 1 VIEW  OTHER: Mild pulmonary edema with trace bilateral pleural effusions suggestive of  mild CHF. Superimposed patchy airspace opacities, most significant in the lower  lobes, suspicious for superimposed multifocal pneumonia. No definite  pneumothorax or large pleural effusion. Cardiomediastinal silhouette mostly  obscured and not well evaluated.     --  IMPRESSION: Please see comment. Recommend follow-up radiographs or chest CT in  6-8 weeks.  IV antibiotics ordered [CF]   1503 Lactate(!): 2.3 [CF]   1524 Trop: 314.9 [SB]   1530 Patient does not have a cardiologist locally.  The family believes they would like him to stay in the [place] area and not go back to [place].  I am going to consult [facility]. [SB]   1538 D/w Dr. Meredith ([facility]) - consider heparinizing the patient for NSTEMI and to admit.   His BP is currently 106/59 and 111/49.     The patient's first lactic acid is 2.3.  There is a possibility that the patient is septic.  However, with a elevated BNP of over thousand and an elevated troponin, I do not believe the patient can get a 30 cc/kg fluid bolus.  We are awaiting the CT scan to come back before deciding disposition. [SB]   1542 BNP(!): 1,103 [CF]   1542 As we are unable to get an O2 sat, I am ordering an ABG. [SB]   1543 I have ordered the unfractionated heparin.     D/w Britni ([facility] for ICU): they are going to review the CT scan and get back to us.  [SB]   1715 High Sens Troponin I(!!): 256.3  2 [CF]   1716 CT ANGIOGRAPHY CHEST/ABDOMEN/PELVIS WITH AND WITHOUT IV CONTRAST  IMPRESSION:  1.  No evidence of pulmonary embolus or acute aortic syndrome.  2.  Extensive bilateral groundglass opacity on a background of fibrotic  interstitial lung disease which may represent acute exacerbation of chronic ILD  or a new  infectious, inflammatory, hemorrhagic or edema from congestive heart  failure.  3.  Right lower lobe lung consolidation with associated mediastinal and hilar  adenopathy which may represent infection or neoplasm.  4.  Multiple liver lesions suggesting metastatic disease.  5.  Multiple other chronic findings.   [CF]   1718 Spoke with Milagros from ICU and they stated as long as pressures are stable patient should be eligible for floor.  [facility] paged for admission. [CF]   1724 Report given to Dr. Austin with [facility]  [CF]      ED Course User Index  [CF] Radha Anguiano CRNP  [SB] Chele Castellon MD     Clinical Impression      None                 Radha Anguiano CRNP  06/01/25 1723

## 2025-06-01 NOTE — ED ATTESTATION NOTE
Critical Care    Performed by: Chele Castellon MD  Authorized by: Chele Castellon MD    Critical care provider statement:     Critical care time (minutes):  30    Critical care was necessary to treat or prevent imminent or life-threatening deterioration of the following conditions:  Respiratory failure    Critical care was time spent personally by me on the following activities:  Discussions with consultants, evaluation of patient's response to treatment, examination of patient, re-evaluation of patient's condition, ordering and review of radiographic studies and ordering and review of laboratory studies    Physical Exam  Review of Systems  Medical Decision Making  Amount and/or Complexity of Data Reviewed  Labs: ordered. Decision-making details documented in ED Course.  Radiology: ordered and independent interpretation performed. Decision-making details documented in ED Course.  ECG/medicine tests: ordered and independent interpretation performed.    Risk  OTC drugs.  Prescription drug management.        6/1/20252:22 PM  I have personally seen and examined the patient. I was involved in the care and medical decision making for this patient.    I reviewed and agree with the PA/NP/Resident's assessment and plan of care, with any exceptions as documented below.    My focused history, examination, assessment, and plan of care of [place] is as follows:  The patient presents with shortness of breath it has been going on for 3 days.  Patient is visiting from [place].  Patient has a history of COPD, interstitial lung disease, hypertensive kidney disease, heart failure, non-insulin-dependent diabetes, coronary artery disease and atrial fibrillation.  Exam: Decreased breath sounds bilaterally consistent with COPD  Irregularly irregular rhythm but rate controlled  Impression/Plan/Medical Decision Making: IV, labs, chest x-ray, BNP, observe    The patient's EKG has some ST elevation in 3 and also some deep T wave  inversions anteriorly.  There were no old EKGs for comparison.  I am going to repeat the EKG.  I am paging the interventional list to send them the EKG        I was physically present for the key/critical portions of the following procedures: None    This document was created using Dragon dictation software.  There might be some typographical errors due to this technology.    We are in a period of time with increased volumes and decreased capacity.      Chele Castellon MD  06/01/25 1530       Chele Castellon MD  06/01/25 9441

## 2025-06-01 NOTE — H&P
Hospital Medicine Service -  History & Physical        CHIEF COMPLAINT     Chief Complaint   Patient presents with    Difficulty Urinating    Shortness of Breath        HISTORY OF PRESENT ILLNESS      79 y.o. male with a past medical history of A-fib on Eliquis, chronic anemia, HFpEF 40%, COPD not on home O2, HLD CKD DM HTN. Lives at  [facility] independent living home in [place] as patient can do most of his ADLs.  However family contact patient 3 weeks ago and has been less committed to as patient admitted to the family of not doing well.  Family eventually were able to bring back back to this area 4 days ago.  However daughter who provided most of the history noticing more of shortness of breath poor appetite less ambulation due to shortness of breath.  However with the now decreasing urine output and difficulty of urinating patient was brought here by the family.  Patient is awake alert x 3 temperature was 97.3 heart rate 90 blood pressure was 81/44 O2 sat was 92% on room air l EKG showed no ST-T wave changes labs showed sodium was 126 potassium 3.9 BUN 33 creatinine 1.3 glucose 275 AST is 75 ALT 70 alk phos is 163 T. bili is 2.6 LDH is 437 uric acid is 2.3/troponin was 351/256 BNP is 1/1/2003 WBCs 25.23 hemoglobin 10.8 hematocrit 33.2 MCV is 100 platelets 149 CT of the chest was negative for PE did show extensive bilateral patchy ground glass opacities mediastinal lymph nodes extensive liver lesions up to 4.3 for PE patient was started on IV Rocephin and Zithromax ED physician contacted cardiology on-call not a STEMI patient recommends heparin drip explained the patient is critical but stable situation patient wanted to be DNR but still be aggressive ED physician contacted ICU since the patient has been hemodynamically stable patient sustained PCU with ED talking to [facility] [place] [facility] presenting for admission and further evaluation  PAST MEDICAL AND SURGICAL HISTORY      Past Medical History:   Diagnosis Date    A-fib  (CMS/[facility])     Anemia     CHF (congestive heart failure) (CMS/[facility])     COPD (chronic obstructive pulmonary disease) (CMS/HCC)     Hyperlipidemia     Kidney disease     Type 2 diabetes mellitus (CMS/HCC)        No past surgical history on file.    MEDICATIONS      Prior to Admission medications   Medication Sig Start Date End Date Taking? Authorizing Provider   apixaban (ELIQUIS) 5 mg tablet Take 5 mg by mouth 2 (two) times a day.   Yes ProviderDoug MD   cyanocobalamin (VITAMIN B12) 1,000 mcg tablet Take 1,000 mcg by mouth daily.   Yes ProviderDoug MD   doxycycline monohydrate (ADOXA) 100 mg tablet Take 100 mg by mouth 2 (two) times a day.   Yes Doug Cartwright MD   famotidine (PEPCID) 20 mg tablet Take 20 mg by mouth 2 (two) times a day.   Yes Doug Cartwright MD   gabapentin (NEURONTIN) 300 mg capsule Take 300 mg by mouth 3 (three) times a day.   Yes Doug Cartwright MD   levoFLOXacin (LEVAQUIN) 750 mg tablet Take 750 mg by mouth daily.   Yes ProviderDoug MD   lisinopriL (PRINIVIL) 10 mg tablet Take 10 mg by mouth daily.   Yes Doug Cartwright MD   metoprolol tartrate (LOPRESSOR) 25 mg tablet Take 25 mg by mouth 2 (two) times a day.   Yes ProviderDoug MD   mupirocin (BACTROBAN) 2 % cream Apply topically 3 (three) times a day.   Yes Doug Cartwright MD   simvastatin (ZOCOR) 10 mg tablet Take 10 mg by mouth nightly.   Yes ProviderDoug MD   spironolactone (ALDACTONE) 25 mg tablet Take 25 mg by mouth daily.   Yes ProviderDoug MD       ALLERGIES      Metformin hcl    FAMILY HISTORY      No family history on file.    SOCIAL HISTORY      Social History     Socioeconomic History    Marital status:    Tobacco Use    Smoking status: Some Days     Types: Cigars    Smokeless tobacco: Never   Substance and Sexual Activity    Alcohol use: Never    Drug use: Never     Social Drivers of Health     Food  Insecurity: No Food Insecurity (6/1/2025)    Hunger Vital Sign     Worried About Running Out of Food in the Last Year: Never true     Ran Out of Food in the Last Year: Never true       REVIEW OF SYSTEMS        Review of Systems  Constitutional: exertional fatigue and 40 pound loss of unexpected weight change.   Eyes: Negative for visual disturbance. Mouth no sore throat  Respiratory: Exertional cough and shortness of breath.  Orthopnea  Cardiovascular: Negative for chest pain and palpitations.   Gastrointestinal: Negative for abdominal pain, constipation, diarrhea, nausea and vomiting.  Poor appetite  Genitourinary: As difficulty urinating. no hematuria no frequency no urgency no discharge  Musculoskeletal: Negative for arthralgias.   Skin: Negative for rash.   Neurological: Negative for dizziness and headaches.   Hematological: Does not bruise/bleed easily.   Psychiatric/Behavioral: Negative for confusion and dysphoric mood no suicidal ideations          PHYSICAL EXAMINATION      Temp:  [36.3 °C (97.3 °F)] 36.3 °C (97.3 °F)  Heart Rate:  [77-96] 85  Resp:  [18-29] 29  BP: ()/(44-59) 86/54  Body mass index is 29.03 kg/m².    General exam : appears age stated, fairly nourished, not in distress  Head: atraumatic, normocephalic  Eyes : PERRLA, EOMI, no pallor, no icterus  ENT: no lesions, oropharynx pink, mucous membranes moist   Neck: supple, no Lymph nodes, no Thyromegaly, no JVD   CVS : IRR S1 and S2 heard, no murmurs, rubs or gallops  Resp:n wheezing bilaterally  Abdomen : soft, Nt, BS +, no organomegaly   Extremities : no edema, no cyanosis   MSK: no DJD, no joint swellings, no joint tenderness   Skin: intact, warm, no rash  Neuro: AAO x3, CN 2-12 intact,  motor strength in all extremities, sensations, DTRs, coordination intact.  Psych: normal mood.cooperative      LABS / IMAGING / EKG        Labs  CBC Results         06/01/25     1418    WBC 25.23    RBC 3.11    HGB 10.8    HCT 33.2    .8    MCH 34.7     MCHC 32.5              CMP Results         06/01/25     1418        K 3.9    Cl 94    CO2 22    Glucose 275    BUN 33    Creatinine 1.3    Calcium 9.2    Anion Gap 10    AST 75    ALT 70    Albumin 3.8    EGFR 55.5           Comment for K at 1418 on 06/01/25: Results obtained on plasma. Plasma Potassium values may be up to 0.4 mEQ/L less than serum values. The differences may be greater for patients with high platelet or white cell counts.    Comment for EGFR at 1418 on 06/01/25: Calculation based on the Chronic Kidney Disease Epidemiology Collaboration (CKD-EPI) equation refit without adjustment for race.            Troponin I Results         06/01/25 06/01/25     1626 1418    HS Troponin I 256.3 351.3          Microbiology Results       Procedure Component Value Units Date/Time    SARS-COV-2 (COVID-19)/ FLU A/B, AND RSV, PCR Nasopharynx [[id number]]  (Normal) Collected: 06/01/25 1419    Specimen: Nasopharyngeal Swab from Nasopharynx Updated: 06/01/25 1502     SARS-CoV-2 (COVID-19) Negative     Influenza A Negative     Influenza B Negative     Respiratory Syncytial Virus Negative    Narrative:      Testing performed using real-time PCR for detection of COVID-19. EUA approved validation studies performed on site.           UA Results    No lab values to display.         Imaging  CT ANGIOGRAPHY CHEST/ABDOMEN/PELVIS WITH AND WITHOUT IV CONTRAST   Final Result   IMPRESSION:   1.  No evidence of pulmonary embolus or acute aortic syndrome.   2.  Extensive bilateral groundglass opacity on a background of fibrotic   interstitial lung disease which may represent acute exacerbation of chronic ILD   or a new infectious, inflammatory, hemorrhagic or edema from congestive heart   failure.   3.  Right lower lobe lung consolidation with associated mediastinal and hilar   adenopathy which may represent infection or neoplasm.   4.  Multiple liver lesions suggesting metastatic disease.   5.  Multiple other chronic  findings.         COMMENT:   Technique: Computed tomography angiography of the chest, abdomen and pelvis was   performed from the thoracic inlet through the pelvis using a dissection protocol   without and with utilizing 100 cc Isovue-370.      3D MIP and/or volume rendered image reconstruction performed and reviewed.      CT DOSE:  One or more dose reduction techniques (e.g. automated exposure   control, adjustment of the mA and/or kV according to patient size, use of   iterative reconstruction technique) utilized for this examination.      Comparisons studies: Chest x-ray dated the same day.      Lung parenchyma: Extensive bilateral patchy groundglass opacity, peripheral   reticulation, mild bronchial dilatation and minimal architectural distortion.   There is right lower lobe consolidation      Thyroid:  Normal.      Mediastinum: Mildly prominent lymph nodes the largest a subcarinal node   measuring up to 2.6 cm.      Dariana: Mildly prominent bilateral lymph nodes.      Heart and pericardium: Severe coronary calcification.  Cardiomegaly.      Pulmonary artery: Normal      Aorta: Normal      Chest wall and pleura: Normal.      Axilla: Normal.      Liver: Extensive liver lesions the largest measures up to 4.3 cm.      Spleen: Normal.      Adrenals: Normal.      Pancreas: Normal.      Kidneys, ureters and bladder: Normal.      Gallbladder: Removed      Retroperitoneal structures: Normal.      Bowel and mesentery: No inflammatory or obstructive process.  Colonic   diverticuli.      Vessels: Aortoiliac atherosclerotic disease.  No evidence of acute aortic   syndrome.      Lymph nodes: None enlarged.      Pelvic structures:  Normal.      Bones: Degenerative change.               X-RAY CHEST 1 VIEW   ED Interpretation   Increased interstitial markings consistent with congestive heart failure.  Also bibasilar infiltrate.  No old for comparison.      Final Result   IMPRESSION: Please see comment. Recommend follow-up  radiographs or chest CT in   6-8 weeks.            ECG 12 lead   ED Interpretation   Independently interpreted with Dr. Castellon:  Rhythm: Atrial fibrillation with PVCs  Rate: 79  ST Segments: no obvious ST elevation or ischemia    Rhythm Strip: [NSR]    O2 sat: [normal]              ECG/Telemetry  reviewed by me    ASSESSMENT AND PLAN         # sepsis  - lactate: 2.3 (6/1/25 16:17) unchanged from 2.3 (6/1/25 14:48)  - completed: sodium chloride 0.9 % bolus 4500 mg iv  - start: albumin human solution 5% iv once (due 06/01/25)  - start: azithromycin 500 mg iv q24h (due 06/02/25)  - start: ceftriaxone 1 g iv q24h (due 06/02/25)  PCU under [facility]  May need to have pressors    # Congestive heart failure  - LV ejection fraction: 40 % (1/10/25)  - holding home: lisinopril 10 mg po, metoprolol tartrate 25 mg po, spironolactone 25 mg po     # Pneumonia  - start: azithromycin 500 mg iv q24h (due 06/02/25)  - start: ceftriaxone 1 g iv q24h (due 06/02/25)  - holding home: doxycycline 100 mg po, levofloxacin 750 mg po     # Possible exacerbation of chronic obstructive pulmonary disease  - start: ceftriaxone 1 g iv q24h (due 06/02/25)  - start: azithromycin 500 mg iv q24h (due 06/02/25)  - currently on: albuterol-ipratropium 2.5 mg-0.5 mg nebulizer QID     # ckd  - start: sodium citrate 1 g po TID (due 06/02/25)  - start: albumin human solution 5% iv once (due 06/01/25)  - holding home: lisinopril 10 mg po, spironolactone 25 mg po  - completed: sodium chloride 0.9 % bolus 4500 mg iv     # Atrial fibrillation  - [id number] score: 3 (6/1/25), moderate-high risk of stroke (3.2% per year)  - currently on: heparin 100 - 4000 units/hr iv Titrated continuously  - holding home: apixaban 5 mg po, metoprolol tartrate 25 mg po     # Anemia: chronic, downtrending, macrocytic  - home: cyanocobalamin 1000 mcg po     # alcohol use  - holding home: gabapentin 300 mg po   To telemetry under [facility]  CIWA protocol  Ativan as needed  Multivitamins p.o.  next following day  repeat labs in a.m.     # Thrombocytopenia  - Platelets: 149 1000/uL (6/1/25)  Continue with home meds  bp parameters for hypotension  dvt prophylaxis with heparin  Pt is dnr   Spent 75  minutes assessing planning for patient care      Charts reviewed  Old labs/ imaging reviewed   Assessment & Plan  Sepsis due to Streptococcus species without acute organ dysfunction (CMS/HCC)    Pneumonia of both lower lobes due to infectious organism    Type 2 diabetes mellitus (CMS/HCC)    Kidney disease    COPD (chronic obstructive pulmonary disease) (CMS/HCC)    CHF (congestive heart failure) (CMS/HCC)    Anemia    A-fib (CMS/HCC)    Liver mass      VTE Assessment: Padua    VTE Prophylaxis Plan:   Code Status: No Order       Wandy Walden MD  6/1/2025

## 2025-06-02 PROBLEM — Z51.5 PALLIATIVE CARE BY SPECIALIST: Status: ACTIVE | Noted: 2025-01-01

## 2025-06-02 PROBLEM — C78.7 SECONDARY MALIGNANT NEOPLASM OF LIVER (CMS/HCC): Status: ACTIVE | Noted: 2025-01-01

## 2025-06-02 PROBLEM — C34.91: Status: ACTIVE | Noted: 2025-01-01

## 2025-06-02 PROBLEM — R53.81 DEBILITY: Status: ACTIVE | Noted: 2025-01-01

## 2025-06-02 NOTE — CONSULTS
Infectious Disease Consult Note    Patient Name: Juaquin Linda  MR#: [id number]  : 1945  Admission Date: 2025  Consult Date: 25 3:11 PM   Consultant: Jvoanni Zuniga MD    Reason for Consult: [facility] epi consult    History of Present Illness     Juaquin Linda is a 79 y.o. male who was admitted on 2025.    Outside records were reviewed.    Patient is an 79-year-old gentleman history of COPD baseline 2 L, CHF, lung cancer, HTN, DM2, CAD, A-fib who presented to the hospital on  with reported worsening shortness of breath 3 days prior to admission.  Upon and since presentation to the hospital patient has remained afebrile, hemodynamically stable though borderline hypotensive to the 80s systolic initially requiring 2 L nasal cannula now on high flow nasal cannula 10 L/min  Admission labs notable for hyponatremia of 126.  Initial leukocytosis 25K, anemic, thrombocytopenic 149.  Urinalysis unremarkable    Microbiology:   RVP negative   blood cultures 1 of 2 positive for GPC's with [employer] reporting MRSE    Radiology:   chest x-ray pulmonary edema, bilateral pleural effusions, patchy airspace opacities concerning for multifocal pneumonia.   CT angio of the chest, abdomen, pelvis.  No PE.  Extensive bilateral groundglass opacities, right lower lung consolidation with mediastinal/hilar lymphadenopathy.  Hepatic lesions concerning for metastatic disease    Antimicrobial history:  Ceftriaxone -P  Azithromycin -p    Patient was evaluated by cardiology for dyspnea and hypoxemia and elevated troponins.  Patient was eval by pulmonary for acute hypoxic and concern for multifocal pneumonia with recommendations to continue ceftriaxone and azithromycin.    During present encounter patient is accompanied by his daughter at the bedside.  Patient reports that he has been short of breath for weeks acutely worsening over the past few days.  Patient does feel short of breath simply holding conversation  with me.  Patient denies any cough.  Family notes that he was experiencing chills this past Saturday without objective fever.  Patient typically resides in [place].  [place] of [place].  No high risk exposures including significant time outdoors, animal or insect encounters, time spent in water.  Patient confirms baseline oxygen requirement.  No recent illnesses.  No hospitalizations within the past month.  Last hospitalized around Thanksgiving 2020 for for rib fracture complicated by pneumonia.  Patient confirms he has no indwelling hardware.  With regards to his history of lung cancer.  Last treatment January 2025, unknown regimen.  States he believes his cancer is gone      Allergies:   Allergies   Allergen Reactions    Metformin Hcl Other (see comments)       Medical History:   Past Medical History:   Diagnosis Date    A-fib (CMS/[facility])     Anemia     CHF (congestive heart failure) (CMS/[facility])     COPD (chronic obstructive pulmonary disease) (CMS/[facility])     Hyperlipidemia     Kidney disease     Type 2 diabetes mellitus (CMS/[facility])        Surgical History: No past surgical history on file.    Social History:   Social History     Socioeconomic History    Marital status:    Tobacco Use    Smoking status: Some Days     Types: Cigars    Smokeless tobacco: Never   Substance and Sexual Activity    Alcohol use: Never    Drug use: Never     Social Drivers of Health     Food Insecurity: No Food Insecurity (6/1/2025)    Hunger Vital Sign     Worried About Running Out of Food in the Last Year: Never true     Ran Out of Food in the Last Year: Never true          Travel Exposure:   Travel and Exposure Screening      Flowsheet Row Most Recent Value   Travel Screening    Overnight hospitalization outside the [place] in the last year? No Filed On: 06/01/2025 1401   Exposure Screening    Symptoms        Animal Exposure: none    Other Exposure: none    Family History: No family history on file.    Review of Systems    All other  "systems reviewed and negative except as noted in the HPI.    Medications:    Current IP Meds (From admission, onward)          Frequency     thiamine (VITAMIN B1) tablet 100 mg  (Thiamine)        Placed in \"Followed by\" Linked Group    Daily     gabapentin (NEURONTIN) capsule 300 mg         Nightly     azithromycin (ZITHROMAX) IVPB 500 mg in 250 mL NSS vial in bag         Every 24 hours interval     cefTRIAXone (ROCEPHIN) IVPB 1 g in 100 mL NSS vial in bag         Every 24 hours interval     lisinopriL (PRINIVIL) tablet 10 mg         Daily     metoprolol tartrate (LOPRESSOR) tablet 12.5 mg         2 times daily     famotidine (PEPCID) tablet 20 mg         Nightly     mometasone-formoterol (DULERA 200) 200-5 mcg/actuation inhaler 2 puff  (mometasone-formoterol 200 mcg-5 mcg (Dulera) inhaler PLUS tiotropium 2.5 mcg (Spiriva Respimat) inhalation)        Placed in \"And\" Linked Group    2 times daily (8a, 8p)     tiotropium bromide (SPIRIVA RESPIMAT) 2.5 mcg/actuation inhaler 2 puff  (mometasone-formoterol 200 mcg-5 mcg (Dulera) inhaler PLUS tiotropium 2.5 mcg (Spiriva Respimat) inhalation)        Placed in \"And\" Linked Group    Daily (8a)     insulin glargine U-100 (LANTUS/BASAGLAR) pen 12 Units         Nightly     famotidine (PEPCID) tablet 20 mg  Status:  Discontinued         Nightly PRN     cyanocobalamin (VITAMIN B12) tablet 1,000 mcg         Daily     metoprolol tartrate (LOPRESSOR) tablet 25 mg  Status:  Discontinued         2 times daily     pravastatin (PRAVACHOL) tablet 20 mg         Daily     famotidine (PEPCID) tablet 20 mg  Status:  Discontinued         2 times daily     gabapentin (NEURONTIN) capsule 300 mg  Status:  Discontinued         3 times daily     traMADoL (ULTRAM) tablet 50 mg         Every 6 hours PRN     sodium chloride tablet 1 g         3 times daily with meals     insulin lispro U-100 (HumaLOG) pen 2-10 Units  (Humalog Insulin Correction Factor for patient weight >=91 kg/>= 200 lb)         3 " "times daily with meals     dexmedeTOMIDine in 0.9 % NaCL (PRECEDEX) 400 mcg/100 mL (4 mcg/mL) infusion  Status:  Discontinued         Titrated     apixaban (ELIQUIS) tablet 5 mg  Status:  Discontinued         2 times daily     diazePAM (VALIUM) injection 2.5 mg  (DiazePAM IV)        Placed in \"Or\" Linked Group    Every 2 hour PRN     diazePAM (VALIUM) injection 2 mg  (DiazePAM IV)        Placed in \"Or\" Linked Group    Every 1 hour PRN     diazePAM (VALIUM) injection 5 mg  (DiazePAM IV)        Placed in \"Or\" Linked Group    Every 30 min PRN     pantoprazole (PROTONIX) injection 40 mg         Every 24 hours     thiamine (B-1) injection 200 mg  (Thiamine)        Placed in \"Followed by\" Linked Group    Every 8 hours interval     folic acid (FOLVITE) tablet 1 mg  (Folic acid)         Daily     alum-mag hydroxide-simeth (MAALOX) 200-200-20 mg/5 mL suspension 30 mL  (Indigestion)         Every 4 hours PRN     ondansetron (ZOFRAN) injection 4 mg  (Nausea/Vomiting)  Status:  Discontinued         Every 8 hours PRN     senna (SENOKOT) tablet 1 tablet  (Constipation)  Status:  Discontinued         2 times daily PRN     diphenhydrAMINE (BENADRYL) capsule 25 mg  (Itching/Pruritis)         Every 6 hours PRN     LORazepam (ATIVAN) injection 0.5 mg  (LORazepam IV)  Status:  Discontinued        Placed in \"Or\" Linked Group    Every 2 hour PRN     LORazepam (ATIVAN) injection 1 mg  (LORazepam IV)  Status:  Discontinued        Placed in \"Or\" Linked Group    Every 1 hour PRN     LORazepam (ATIVAN) injection 1 mg  (LORazepam IV)  Status:  Discontinued        Placed in \"Or\" Linked Group    Every 30 min PRN     glucose chewable tablet 15-30 g of dextrose  (Hypoglycemia Treatment Protocol and Hyperglycemia Validation Protocol)        Placed in \"Or\" Linked Group    As needed     dextrose 40 % oral gel 15-30 g of dextrose  (Hypoglycemia Treatment Protocol and Hyperglycemia Validation Protocol)        Placed in \"Or\" Linked Group    As needed    " " glucagon (GLUCAGEN) injection 1 mg  (Hypoglycemia Treatment Protocol and Hyperglycemia Validation Protocol)        Placed in \"Or\" Linked Group    As needed     dextrose 50 % in water (D50) injection 12.5 g  (Hypoglycemia Treatment Protocol and Hyperglycemia Validation Protocol)        Placed in \"Or\" Linked Group    As needed     albumin human 5 % solution 50 g  (Other)         Once     ipratropium-albuteroL (DUO-NEB) 0.5-2.5 mg/3 mL nebulizer solution 3 mL         Every 6 hours     benzonatate (TESSALON) capsule 100 mg         3 times daily PRN     acetaminophen (TYLENOL) tablet 650 mg         Every 6 hours PRN     hydrALAZINE (APRESOLINE) injection 10 mg  Status:  Discontinued         Every 4 hours PRN     melatonin tablet 5 mg  Status:  Discontinued         Nightly PRN     iopamidoL (ISOVUE-370) 370 mg iodine /mL (76 %) injection 100 mL         Once in imaging     heparin (porcine) bolus from bag 7,050 Units  (heparin infusion High Intensity; PTT goal 74 to 106 seconds)         Once     heparin infusion in D5W 100 units/mL  (heparin infusion High Intensity; PTT goal 74 to 106 seconds)         Titrated     heparin (porcine) bolus from bag 3,500-7,050 Units  (heparin infusion High Intensity; PTT goal 74 to 106 seconds)         Every 6 hours PRN     heparin (porcine) bolus from bag 7,250 Units  (heparin infusion High Intensity; PTT goal 74 to 106 seconds)  Status:  Discontinued         Once     heparin infusion in D5W 100 units/mL  (heparin infusion High Intensity; PTT goal 74 to 106 seconds)  Status:  Discontinued         Titrated     aspirin chewable tablet 324 mg         Once     heparin (porcine) bolus from bag 3,650-7,250 Units  (heparin infusion High Intensity; PTT goal 74 to 106 seconds)  Status:  Discontinued         Every 6 hours PRN     cefTRIAXone (ROCEPHIN) IVPB 1 g in 100 mL NSS vial in bag  (ED IV Antibiotics)         Once     azithromycin (ZITHROMAX) IVPB 500 mg in 250 mL NSS vial in bag  (ED IV " Antibiotics)         Once     sodium chloride 0.9 % bolus 500 mL         Once            Anti-infectives (From admission, onward)      Start     Dose/Rate Route Frequency Ordered Stop    06/02/25 1515  azithromycin (ZITHROMAX) IVPB 500 mg in 250 mL NSS vial in bag         500 mg  250 mL/hr over 60 Minutes intravenous Every 24 hours interval 06/01/25 1752 06/02/25 1515  cefTRIAXone (ROCEPHIN) IVPB 1 g in 100 mL NSS vial in bag         1 g  200 mL/hr over 30 Minutes intravenous Every 24 hours interval 06/01/25 1752                Objective     Vital Signs:    Patient Vitals for the past 72 hrs:   BP Temp Temp src Pulse Resp SpO2 Height Weight   06/02/25 1300 (!) 95/50 -- -- (!) 103 (!) 26 94 % -- --   06/02/25 1205 (!) 102/53 -- -- (!) 105 (!) 27 95 % -- --   06/02/25 1200 (!) 102/53 36.3 °C (97.3 °F) Temporal (!) 105 (!) 57 93 % -- --   06/02/25 1153 -- -- -- -- (!) 26 95 % -- --   06/02/25 1035 (!) 90/54 -- -- 93 (!) 28 97 % -- --   06/02/25 1000 (!) 96/52 -- -- 98 (!) 52 97 % -- --   06/02/25 0900 (!) 103/56 -- -- (!) 105 (!) 26 99 % -- --   06/02/25 0800 111/66 (!) 35 °C (95 °F) Axillary (!) 112 (!) 35 97 % -- --   06/02/25 0759 111/66 -- -- (!) 109 20 96 % -- --   06/02/25 0630 (!) 105/55 -- -- (!) 113 20 100 % -- --   06/02/25 0559 -- -- -- -- -- 92 % -- --   06/02/25 0530 (!) 116/57 -- -- (!) 112 -- 95 % -- --   06/02/25 0330 (!) 123/57 -- -- (!) 114 -- 96 % -- --   06/02/25 0300 124/69 -- -- (!) 112 (!) 30 96 % -- --   06/02/25 0236 -- -- -- (!) 112 (!) 24 99 % -- --   06/02/25 0229 138/65 -- -- (!) 110 (!) 30 99 % -- --   06/02/25 0200 (!) 120/58 37.3 °C (99.1 °F) Temporal (!) 108 (!) 28 (!) 83 % -- --   06/02/25 0100 (!) 117/58 -- -- 100 (!) 22 92 % -- --   06/02/25 0000 137/63 -- -- (!) 106 20 94 % -- --   06/01/25 2350 (!) 127/53 36.6 °C (97.8 °F) Temporal (!) 109 (!) 22 (!) 90 % -- --   06/01/25 2300 (!) 106/57 -- -- 83 20 92 % -- --   06/01/25 2210 (!) 104/57 -- -- 82 20 92 % -- --   06/01/25 2120  "(!) 106/50 -- -- 89 (!) 26 92 % -- --   25 2110 (!) 108/56 -- -- 79 (!) 26 99 % -- --   25 2100 (!) 111/57 -- -- 79 (!) 21 97 % -- --   25 2040 (!) 107/51 -- -- 85 (!) 22 95 % -- --   25 (!) 98/53 -- -- 91 (!) 24 97 % -- --   25 195 (!) 104/55 -- -- 86 16 92 % -- --   25 194 (!) 91/52 -- -- 89 18 92 % -- --   25 1930 (!) 88/51 -- -- 97 (!) 30 (!) 82 % -- --   25 1911 (!) 96/52 -- -- -- -- 97 % -- --   25 1747 (!) 86/54 -- -- 85 (!) 29 92 % -- --   25 1707 (!) 87/52 36.3 °C (97.3 °F) Temporal 81 20 95 % -- --   25 1613 -- -- -- -- -- -- -- 99.8 kg (220 lb)   25 1602 -- -- -- -- -- 93 % -- --   25 1540 (!) 115/56 -- -- 95 18 -- -- --   25 1520 (!) 106/59 -- -- -- -- -- -- --   25 1517 -- -- -- -- (!) 22 -- -- --   25 1501 (!) 111/49 -- -- 90 18 -- -- --   25 1445 (!) 81/44 -- -- 86 18 -- -- --   25 1431 (!) 92/49 -- -- 77 20 -- -- --   25 1353 (!) 82/56 36.3 °C (97.3 °F) -- 96 (!) 28 -- 1.854 m (6' 1\") 90.7 kg (200 lb)       Temp (72hrs), Av.3 °C (97.3 °F), Min:35 °C (95 °F), Max:37.3 °C (99.1 °F)      Physical Exam:    Visit Vitals  BP (!) 95/50   Pulse (!) 103   Temp 36.3 °C (97.3 °F) (Temporal)   Resp (!) 26   Ht 1.854 m (6' 1\")   Wt 99.8 kg (220 lb)   SpO2 94%   BMI 29.03 kg/m²       General Appearance:    Alert, cooperative, no distress, appears stated age   Head:    Normocephalic, without obvious abnormality, atraumatic   Lungs:   Decreased breath sounds, bibasilar crackles   Chest wall:    No tenderness or deformity   Heart:    Regular rate and rhythm, S1 and S2 normal, no murmur, rub   or gallop   Abdomen:     Soft, non-tender, bowel sounds active all    Extremities:  Musculoskeletal   Extremities normal, atraumatic, no cyanosis or edema    No injury or deformity   Skin:   Skin color, texture, turgor normal, no rashes or lesions   Neurologic:    Behavior/  Emotional:   Aox3      " Appropriate, cooperative       Lines, Drains, Airways, Wounds:  Peripheral IV (Adult) 06/01/25 Right Antecubital (Active)   Number of days: 1       Peripheral IV (Adult) 06/01/25 Anterior;Distal;Left;Upper Arm (Active)   Number of days: 1       Urethral Catheter Latex;Temperature probe 16 Fr (Active)   Number of days: 1       Labs:    CBC Results         06/01/25     1418    WBC 25.23    RBC 3.11    HGB 10.8    HCT 33.2    .8    MCH 34.7    MCHC 32.5              BMP Results         06/02/25 06/01/25     0603 1418     127      126    K 3.9 3.9      3.9    Cl 100 94      94    CO2 23 19      22    Glucose 228 273      275    BUN 23 33      33    Creatinine 0.9 1.1      1.3    Calcium 8.8 9.2      9.2    Anion Gap 9 14      10    EGFR >60.0 >60.0      55.5           Comment for K at 0603 on 06/02/25: Results obtained on plasma. Plasma Potassium values may be up to 0.4 mEQ/L less than serum values. The differences may be greater for patients with high platelet or white cell counts.    Comment for K at 1418 on 06/01/25: Results obtained on plasma. Plasma Potassium values may be up to 0.4 mEQ/L less than serum values. The differences may be greater for patients with high platelet or white cell counts.    Comment for K at 1418 on 06/01/25: Results obtained on plasma. Plasma Potassium values may be up to 0.4 mEQ/L less than serum values. The differences may be greater for patients with high platelet or white cell counts.    Comment for EGFR at 0603 on 06/02/25: Calculation based on the Chronic Kidney Disease Epidemiology Collaboration (CKD-EPI) equation refit without adjustment for race.    Comment for EGFR at 1418 on 06/01/25: Calculation based on the Chronic Kidney Disease Epidemiology Collaboration (CKD-EPI) equation refit without adjustment for race.    Comment for EGFR at 1418 on 06/01/25: Calculation based on the Chronic Kidney Disease Epidemiology Collaboration (CKD-EPI) equation refit without  adjustment for race.          PT/PTT Results         06/02/25 06/02/25 06/01/25     1004 0158 2342    PTT 78 59 205           Comment for PTT at 1004 on 06/02/25: The Standard Therapeutic Range for Heparin is 74 to 106 seconds.    Comment for PTT at 0158 on 06/02/25: The Standard Therapeutic Range for Heparin is 74 to 106 seconds.    Comment for PTT at 2342 on 06/01/25: The Standard Therapeutic Range for Heparin is 74 to 106 seconds.          UA Results         06/01/25 2026    Color Yellow    Clarity Clear    Glucose Negative    Bilirubin Negative    Ketones Negative    Sp Grav 1.035    Blood Negative    Ph 6.0    Protein Negative    Urobilinogen 0.2    Nitrite Negative    Leuk Est Negative           Comment for Blood at 2026 on 06/01/25: The sensitivity of the occult blood test is equivalent to approximately 4 intact RBC/HPF.    Comment for Leuk Est at 2026 on 06/01/25: Results can be falsely negative due to high specific gravity, some antibiotics, glucose >3 g/dl, or WBC other than neutrophils.          Lactate Results         06/01/25 06/01/25 06/01/25     2342 2050 1936    Lactate 1.9 2.2 2.4            Microbiology Results       Procedure Component Value Units Date/Time    Blood Culture Blood, Venous [[id number]]  (Abnormal) Collected: 06/01/25 1448    Specimen: Blood, Venous Updated: 06/02/25 1448     Culture **Positive Culture**     Gram Stain Result Gram positive cocci in pairs and clusters    Blood Culture PCR Panel Blood, Venous [[id number]]  (Abnormal) Collected: 06/01/25 1448    Specimen: Blood, Venous Updated: 06/02/25 1447     Candida albicans Not Detected     Candida auris Not Detected     Candida glabrata Not Detected     Candida krusei Not Detected     Candida parapsilosis Not Detected     Cryptococcus neoformans/gattii Not Detected     Enterococcus faecalis Not Detected     Enterococcus faecium Not Detected     Enterobacterales Not Detected     Enterobacter cloacae complex Not Detected      Escherichia coli Not Detected     Klebsiella oxytoca Not Detected     Klebsiella pneumoniae Not Detected     Klebsiella aerogenes Not Detected     Proteus Not Detected     Salmonella species Not Detected     Serratia marcescens Not Detected     Haemophilus influenzae Not Detected     Listeria monocytogenes Not Detected     Neisseria meningitidis Not Detected     Pseudomonas aeruginosa Not Detected     Stenotrophomonas maltophilia Not Detected     Bacteroides fragilis Not Detected     Staphylococcus aureus Not Detected     Staphylococcus ludgnensis Not Detected     Staphylococcus epidermidis Detected     Streptococcus Not Detected     Streptococcus agalactiae (Group B) Not Detected     Streptococcus pneumoniae Not Detected     Streptococcus pyogenes (Group A) Not Detected     KPC (Carbapenem Resistance Gene) Not Applicable     mecA/C Detected     mecA/C and MREJ (MRSA) Not Applicable     Jasmin/B (Vancomycin Resistance Gene) Not Applicable     CTX-M (ESBL) Not Applicable     IMP Not Applicable     mcr-1 Not Applicable     NDM Not Applicable     OXA-48-like Not Applicable     VIM Not Applicable     Comment: --    SARS-COV-2 (COVID-19)/ FLU A/B, AND RSV, PCR Nasopharynx [[id number]]  (Normal) Collected: 06/01/25 1419    Specimen: Nasopharyngeal Swab from Nasopharynx Updated: 06/01/25 1502     SARS-CoV-2 (COVID-19) Negative     Influenza A Negative     Influenza B Negative     Respiratory Syncytial Virus Negative    Narrative:      Testing performed using real-time PCR for detection of COVID-19. EUA approved validation studies performed on site.             Pathology Results       ** No results found for the last 720 hours. **            Echo:         Imaging:    Radiology Imaging    XR CHEST 1 VW    Narrative  CLINICAL HISTORY:   Increased oxygen requirement    COMMENT:    PROCEDURE: Single frontal view of the chest.    COMPARISON:   Prior chest x-ray and chest CT dated June 1, 2025    Support lines, tubes, devices, and  surgical hardware, material: Monitoring  leads/wires overlie the patient.    Lungs and Pleura: Diffuse bilateral hazy airspace opacity and patchy  consolidation which has progressed. Possible small right pleural effusion. No  pneumothorax.    Cardiovascular and Mediastinum: The cardiomediastinal silhouette is stable.  Cardiomegaly.    Other: No acute osseous abnormality.    Impression  IMPRESSION:  Diffuse bilateral airspace opacity, progressed      Assessment     >> Acute hypoxic respiratory failure likely due to multifocal pneumonia, though multifactorial with history of CHF, COPD, pulm fibrosis  >> GPC's on blood culture, [employer] MRSE  >> History of lung cancer    Plan   - Agree with ceftriaxone and azithromycin for now, No significant environmental exposures as above and no recent hospitalizations  -With regards to GPC's, likely contaminant.  Patient without indwelling hardware or devices.    - Follow-up blood cultures  -Obtain respiratory culture possible    Infectious disease will continue to follow-up  Case discussed with Dr. Massiel Zuniga MD  Infectious Disease   [facility]

## 2025-06-02 NOTE — CONSULTS
Palliative Care Consult      This is Hospital Day: 2    Conversation/Goals of Care:  Evolving. Met with pt and son in ED. Goals currently treatment-focused with limits, DNR/DNI. Pt stating he feels he does not have much time left and does not think he will ever make it back to [place] again. Further goals pending oncology consult.    Assessment/Plan  Assessment & Plan  Debility  - Debility likely multifactorial in the setting of HF/COPD/PF, PNA, new concern for metastatic disease.    - Living situation prior to hospitalization [place], visiting for [employer] graduation.   - Baseline functional status is independent, recently more SOB and weak.   - Current Palliative Performance Status: 30%  - Baseline Palliative Performance Status: 80%   - Patient remains high risk for further deterioration and functional decline.    Plan:  - Palliative to follow as hospitalization evolves  - Further goals pending oncology consult.    Palliative care by specialist  79 y.o. with a PMH of HF, COPD/PF admitted for sepsis w/ ADHF in s/o multifocal PNA, imaging with multiple liver lesions and concern for metastatic dx    - Code status: Do Not Resuscitate / Allow Natural Death. Discussed today.  - Prognosis: guarded, pending oncology consult and work-up  - Advance Directives: yes, not on file  - Surrogate Decision Maker: designates his son Kendrick  - Capacity intact   - Support: 2 sons, DIL grant, grandchildren      Reason for Consult:  Assistance with clarification of goals of care    HPI      Source: EMR, medical attending, ED nurse    Juaquin Linda is an 79M A-fib on Eliquis, chronic anemia, HFpEF 40%, COPD/PF not on home O2, HLD CKD DM HTN presents from [facility] while visiting family in [place] w/ progressive SOB and weakness, admitted sepsis w ADHF in s/o multifocal CAP, imaging with multiple liver lesions and concern for metastatic disease.    Seen at bedside, reports low back pain in s/o ER stretcher and chronic pains. SOB at rest and with  minimal exertion, worse yesterday per son at bedside, currently on HFNC.      Chart Review:  The following portions of the patient’s history were reviewed and updated as appropriate:    Past Medical History  Past Medical History:   Diagnosis Date    A-fib (CMS/HCC)     Anemia     CHF (congestive heart failure) (CMS/HCC)     COPD (chronic obstructive pulmonary disease) (CMS/[facility])     Hyperlipidemia     Kidney disease     Type 2 diabetes mellitus (CMS/[facility])        Past Surgical History  No past surgical history on file.    Yazidi:  No Adventist on file    Review of Systems:  All other systems reviewed and negative except as noted in the HPI.      Current Medications:  Current Facility-Administered Medications   Medication Dose Route Frequency Provider Last Rate Last Admin    acetaminophen (TYLENOL) tablet 650 mg  650 mg oral q6h PRN Wandy Walden MD   650 mg at 06/02/25 0812    alum-mag hydroxide-simeth (MAALOX) 200-200-20 mg/5 mL suspension 30 mL  30 mL oral q4h PRN Wandy Walden MD        azithromycin (ZITHROMAX) IVPB 500 mg in 250 mL NSS vial in bag  500 mg intravenous q24h INT Wandy Walden MD        benzonatate (TESSALON) capsule 100 mg  100 mg oral 3x daily PRN Wandy Walden MD        cefTRIAXone (ROCEPHIN) IVPB 1 g in 100 mL NSS vial in bag  1 g intravenous q24h INT Wandy Walden MD        glucose chewable tablet 15-30 g of dextrose  15-30 g of dextrose oral PRN Wandy Walden MD        Or    dextrose 40 % oral gel 15-30 g of dextrose  15-30 g of dextrose oral PRN Wandy Walden MD        Or    glucagon (GLUCAGEN) injection 1 mg  1 mg intramuscular PRN Wandy Walden MD        Or    dextrose 50 % in water (D50) injection 12.5 g  25 mL intravenous PRN Wandy Walden MD        diazePAM (VALIUM) injection 2.5 mg  2.5 mg intravenous q2h PRN Wandy Walden MD        Or    diazePAM (VALIUM) injection 2 mg  2 mg intravenous q1h PRN Wandy Walden MD        Or    diazePAM (VALIUM)  injection 5 mg  5 mg intravenous q30 min PRN Wandy Walden MD        diphenhydrAMINE (BENADRYL) capsule 25 mg  25 mg oral q6h PRN Wandy Walden MD        folic acid (FOLVITE) tablet 1 mg  1 mg oral Daily Wandy Walden MD   1 mg at 06/02/25 0811    gabapentin (NEURONTIN) capsule 300 mg  300 mg oral TID Sincere Rankin MD        heparin (porcine) bolus from bag 3,500-7,050 Units  40-80 Units/kg (Adjusted) intravenous q6h PRN Chele Castellon MD        heparin infusion in D5W 100 units/mL  100-4,000 Units/hr intravenous Titrated Chele Castellon MD 13.5 mL/hr at 06/02/25 0328 1,350 Units/hr at 06/02/25 0328    insulin lispro U-100 (HumaLOG) pen 2-10 Units  2-10 Units subcutaneous TID with meals Wandy Walden MD        ipratropium-albuteroL (DUO-NEB) 0.5-2.5 mg/3 mL nebulizer solution 3 mL  3 mL nebulization q6h RUTHY Wandy Walden MD   3 mL at 06/01/25 2352    pantoprazole (PROTONIX) injection 40 mg  40 mg intravenous q24h Wandy Walden MD   40 mg at 06/02/25 0811    sodium chloride tablet 1 g  1 g oral TID with meals Wandy Walden MD   1 g at 06/02/25 0811    thiamine (B-1) injection 200 mg  200 mg intravenous q8h INT Wandy Walden MD   200 mg at 06/02/25 0201    Followed by    [START ON 6/5/2025] thiamine (VITAMIN B1) tablet 100 mg  100 mg oral Daily Wandy Walden MD        traMADoL (ULTRAM) tablet 50 mg  50 mg oral q6h PRN Sincere Rankin MD         Current Outpatient Medications   Medication Sig Dispense Refill    apixaban (ELIQUIS) 5 mg tablet Take 5 mg by mouth 2 (two) times a day.      cyanocobalamin (VITAMIN B12) 1,000 mcg tablet Take 1,000 mcg by mouth daily.      doxycycline monohydrate (ADOXA) 100 mg tablet Take 100 mg by mouth 2 (two) times a day.      famotidine (PEPCID) 20 mg tablet Take 20 mg by mouth 2 (two) times a day.      gabapentin (NEURONTIN) 300 mg capsule Take 300 mg by mouth 3 (three) times a day.      levoFLOXacin (LEVAQUIN) 750 mg tablet Take 750 mg by  mouth daily.      lisinopriL (PRINIVIL) 10 mg tablet Take 10 mg by mouth daily.      metoprolol tartrate (LOPRESSOR) 25 mg tablet Take 25 mg by mouth 2 (two) times a day.      mupirocin (BACTROBAN) 2 % cream Apply topically 3 (three) times a day.      simvastatin (ZOCOR) 10 mg tablet Take 10 mg by mouth nightly.      spironolactone (ALDACTONE) 25 mg tablet Take 25 mg by mouth daily.         Allergies:  Allergies   Allergen Reactions    Metformin Hcl Other (see comments)         Objective    Physical Exam:   Physical Exam  Constitutional:       Appearance: He is ill-appearing.   HENT:      Mouth/Throat:      Mouth: Mucous membranes are dry.   Cardiovascular:      Rate and Rhythm: Tachycardia present.   Pulmonary:      Effort: Tachypnea present.      Comments: HFNC   Abdominal:      General: Abdomen is flat.      Palpations: Abdomen is soft.   Skin:     General: Skin is dry.      Coloration: Skin is pale.   Neurological:      Mental Status: He is alert. Mental status is at baseline.   Psychiatric:         Mood and Affect: Mood normal.         Vitals:  Vitals:    06/02/25 0800   BP: 111/66   Pulse: (!) 112   Resp: (!) 35   Temp: (!) 35 °C (95 °F)   SpO2: 97%       Laboratory Studies:    I have reviewed the patient's pertinent labs to the time of note.  Significant abnormals are na 132, wbc 25.    Imaging and Other Studies:     I have independently reviewed the pertinent imaging to the time of note and agree with reported results. Cxr 6/2/25, no icd or ppm    I have independently reviewed the pertinent cardiac studies to the time of note and agree with reported results. EKG 6/1/25, Qtc 468ms    Advanced care planning: Juaquin has an ACP and Juaquin's surrogate decision maker is his son Kendrick.      Mayte Pugh, [place]  Office [phone]

## 2025-06-02 NOTE — PROGRESS NOTES
Hospital Medicine     Daily Progress Note                SUBJECTIVE   Interval History: Patient was off of bipap this morning. He is complaining of lot of pain in the arm and ribs. He has no fever. Lactic acid is improved with fluids.     OBJECTIVE      Vital signs in last 24 hours:  Temp:  [35 °C (95 °F)-37.3 °C (99.1 °F)] 36.3 °C (97.3 °F)  Heart Rate:  [] 103  Resp:  [16-57] 26  BP: ()/(50-69) 95/50  FiO2 (%) (Set):  [70 %] 70 %    Intake/Output Summary (Last 24 hours) at 6/2/2025 1519  Last data filed at 6/1/2025 2346  Gross per 24 hour   Intake 850 ml   Output 1000 ml   Net -150 ml         PHYSICAL EXAMINATION      Physical Exam  Vitals and nursing note reviewed.   HENT:      Head: Normocephalic and atraumatic.   Cardiovascular:      Rate and Rhythm: Normal rate and regular rhythm.   Pulmonary:      Effort: Respiratory distress present.      Breath sounds: No stridor. Wheezing present. No rhonchi.   Abdominal:      General: Abdomen is flat. Bowel sounds are normal. There is no distension.      Palpations: Abdomen is soft. There is no mass.      Tenderness: There is no abdominal tenderness.      Hernia: No hernia is present.   Musculoskeletal:         General: No swelling, tenderness, deformity or signs of injury.      Right lower leg: No edema.      Left lower leg: No edema.   Skin:     General: Skin is warm and dry.      Coloration: Skin is not jaundiced or pale.      Findings: No bruising, erythema or lesion.   Neurological:      General: No focal deficit present.      Mental Status: He is alert and oriented to person, place, and time.      Cranial Nerves: No cranial nerve deficit.      Sensory: No sensory deficit.      Motor: No weakness.      Coordination: Coordination normal.   Psychiatric:         Mood and Affect: Mood normal.         Behavior: Behavior normal.         Thought Content: Thought content normal.         Judgment: Judgment normal.            LINES, CATHETERS, DRAINS, AIRWAYS,  AND WOUNDS   Lines, Drains, and Airways:  Wounds (agree with documentation and present on admission):  Peripheral IV (Adult) 06/01/25 Right Antecubital (Active)   Number of days: 1       Peripheral IV (Adult) 06/01/25 Anterior;Distal;Left;Upper Arm (Active)   Number of days: 1       Urethral Catheter Latex;Temperature probe 16 Fr (Active)   Number of days: 1         Comments:    LABS / IMAGING / TELE      Labs     LABS   CMP Results         06/02/25 06/01/25     0603 1418     127      126    K 3.9 3.9      3.9    Cl 100 94      94    CO2 23 19      22    Glucose 228 273      275    BUN 23 33      33    Creatinine 0.9 1.1      1.3    Calcium 8.8 9.2      9.2    Anion Gap 9 14      10    AST 53 75    ALT 49 70    Albumin 3.8 3.8    EGFR >60.0 >60.0      55.5           Comment for K at 0603 on 06/02/25: Results obtained on plasma. Plasma Potassium values may be up to 0.4 mEQ/L less than serum values. The differences may be greater for patients with high platelet or white cell counts.    Comment for K at 1418 on 06/01/25: Results obtained on plasma. Plasma Potassium values may be up to 0.4 mEQ/L less than serum values. The differences may be greater for patients with high platelet or white cell counts.    Comment for K at 1418 on 06/01/25: Results obtained on plasma. Plasma Potassium values may be up to 0.4 mEQ/L less than serum values. The differences may be greater for patients with high platelet or white cell counts.    Comment for EGFR at 0603 on 06/02/25: Calculation based on the Chronic Kidney Disease Epidemiology Collaboration (CKD-EPI) equation refit without adjustment for race.    Comment for EGFR at 1418 on 06/01/25: Calculation based on the Chronic Kidney Disease Epidemiology Collaboration (CKD-EPI) equation refit without adjustment for race.    Comment for EGFR at 1418 on 06/01/25: Calculation based on the Chronic Kidney Disease Epidemiology Collaboration (CKD-EPI) equation refit without adjustment for  race.          CBC Results         06/01/25     1418    WBC 25.23    RBC 3.11    HGB 10.8    HCT 33.2    .8    MCH 34.7    MCHC 32.5              Troponin I Results         06/01/25 06/01/25 06/01/25     1936 1626 1418    HS Troponin I 308.3 256.3 351.3          PT/PTT Results         06/02/25 06/02/25 06/01/25     1004 0158 2342    PTT 78 59 205           Comment for PTT at 1004 on 06/02/25: The Standard Therapeutic Range for Heparin is 74 to 106 seconds.    Comment for PTT at 0158 on 06/02/25: The Standard Therapeutic Range for Heparin is 74 to 106 seconds.    Comment for PTT at 2342 on 06/01/25: The Standard Therapeutic Range for Heparin is 74 to 106 seconds.          Lab Results   Component Value Date    BNP 1,103 (H) 06/01/2025         Imaging  X-RAY CHEST 1 VIEW  Result Date: 6/2/2025  IMPRESSION: Diffuse bilateral airspace opacity, progressed     CT ANGIOGRAPHY CHEST/ABDOMEN/PELVIS WITH AND WITHOUT IV CONTRAST  Result Date: 6/1/2025  IMPRESSION: 1.  No evidence of pulmonary embolus or acute aortic syndrome. 2.  Extensive bilateral groundglass opacity on a background of fibrotic interstitial lung disease which may represent acute exacerbation of chronic ILD or a new infectious, inflammatory, hemorrhagic or edema from congestive heart failure. 3.  Right lower lobe lung consolidation with associated mediastinal and hilar adenopathy which may represent infection or neoplasm. 4.  Multiple liver lesions suggesting metastatic disease. 5.  Multiple other chronic findings. COMMENT: Technique: Computed tomography angiography of the chest, abdomen and pelvis was performed from the thoracic inlet through the pelvis using a dissection protocol without and with utilizing 100 cc Isovue-370. 3D MIP and/or volume rendered image reconstruction performed and reviewed. CT DOSE:  One or more dose reduction techniques (e.g. automated exposure control, adjustment of the mA and/or kV according to patient size, use of  iterative reconstruction technique) utilized for this examination. Comparisons studies: Chest x-ray dated the same day. Lung parenchyma: Extensive bilateral patchy groundglass opacity, peripheral reticulation, mild bronchial dilatation and minimal architectural distortion. There is right lower lobe consolidation Thyroid:  Normal. Mediastinum: Mildly prominent lymph nodes the largest a subcarinal node measuring up to 2.6 cm. Dariana: Mildly prominent bilateral lymph nodes. Heart and pericardium: Severe coronary calcification.  Cardiomegaly. Pulmonary artery: Normal Aorta: Normal Chest wall and pleura: Normal. Axilla: Normal. Liver: Extensive liver lesions the largest measures up to 4.3 cm. Spleen: Normal. Adrenals: Normal. Pancreas: Normal. Kidneys, ureters and bladder: Normal. Gallbladder: Removed Retroperitoneal structures: Normal. Bowel and mesentery: No inflammatory or obstructive process.  Colonic diverticuli. Vessels: Aortoiliac atherosclerotic disease.  No evidence of acute aortic syndrome. Lymph nodes: None enlarged. Pelvic structures:  Normal. Bones: Degenerative change.     X-RAY CHEST 1 VIEW  Result Date: 6/1/2025  IMPRESSION: Please see comment. Recommend follow-up radiographs or chest CT in 6-8 weeks.       Lab Results   Component Value Date    VENTRICRATE 80 06/01/2025    QRSDURATION 94 06/01/2025    QTINT 406 06/01/2025    QTCCALCULAT 468 06/01/2025    RAXIS 2 06/01/2025    TWAVEAXIS -41 06/01/2025        ASSESSMENT AND PLAN      # Sepsis  - lactate: 1.9 (6/1/25 23:42) down from 2.2 (6/1/25 20:50), high 2.4 (6/1/25 19:36)  - completed: albumin human solution 5% iv, sodium chloride 0.9 % bolus 4500 mg iv  - start: azithromycin 500 mg iv q24h (due 06/02/25)  - start: ceftriaxone 1 g iv q24h (due 06/02/25)  - SIRS: HR: 91 (6/1/25 20:00), RR: 22 (6/1/25 20:40)  - SOFA score: 5 (6/2/25)  - arterial pCO2: 29 (6/1/25 16:17), T: 36.3 ºC (6/2/25 12:00), HR: 103 / RR: 26 (6/2/25 13:00), T high: 37.3 ºC (6/2/25  2:00)  - white blood cell count: 25.2 (6/1/25)     # acute Hypoxic respiratory failure  He was hypoxic requiring bipap last night, now on high flow  coould be from HF and fluid overload, metastatic lung disease  - RR range: 16-57 (since 6/1/25 15:17), SaO2: 94 % (6/2/25 13:00)  - VBG: pH: 7.35 / pCO2: 43 / pO2: 20 / HCO3: 22 (6/1/25 20:50)  - currently on: albuterol-ipratropium 2.5 mg-0.5 mg nebulizer QID  - currently on: formoterol-mometasone 5 mcg-0.2 mg 2 puff inhalation BID  - currently on: tiotropium 2.5 mcg 2 puff inhalation daily  - holding home: TRELEGY ELLIPTA 200 MCG-62.5 MCG-25 MCG POWDER FOR INHALATION 1 puff     # Congestive heart failure  Moderate MR and Modera TR with severe pulmonary hypertension with underlying lung disease.   - LV ejection fraction: 40 % (1/10/25)  - holding home: spironolactone 25 mg po  - BNP: 1103 (6/1/25)  - continue home: lisinopril 10 mg po daily  - continue home: metoprolol tartrate 12.5 mg po BID    #troponin elevation:   Possible due to HF vs. Sepsis vs. Stress with multiple issues possible demand ischemia   On heparin drip  Cardiology following  Telemetry monitoring  ASA/statin and      # Pneumonia  - start: azithromycin 500 mg iv q24h (due 06/02/25)  - start: ceftriaxone 1 g iv q24h (due 06/02/25)  - holding home: doxycycline 100 mg po  - RR: 26 (6/2/25 13:00)  - respiratory pathogens: Not Detected (6/1/25)  - temperature: 36.3 ºC (6/2/25 12:00) up from 35 ºC (6/2/25 8:00), high 37.3 ºC (6/2/25 2:00)  - white blood cell count: 25.2 (6/1/25)     # Possible exacerbation of chronic obstructive pulmonary disease  - start: ceftriaxone 1 g iv q24h (due 06/02/25)  - start: azithromycin 500 mg iv q24h (due 06/02/25)  - currently on: albuterol-ipratropium 2.5 mg-0.5 mg nebulizer QID  - ABG: pH: 7.43 / pCO2: 29 / pO2: 49 (6/1/25 16:17)  - RR: 26 (6/2/25 13:00)  - VBG: pH: 7.35 / pCO2: 43 / pO2: 20 (6/1/25 20:50)  - respiratory pathogens: Not Detected (6/1/25)  - currently on:  tiotropium 2.5 mcg 2 puff inhalation daily  - holding home: TRELEGY ELLIPTA 200 MCG-62.5 MCG-25 MCG POWDER FOR INHALATION 1 puff  _-pulmonary recommendations appreciated      # metastatic lung disease  -has pulmonary nodules, liver nodules  -oncology on board  -when stable might need biopsy      # Ckd stage 3 a  - currently on: sodium citrate 1 g po TID  - holding home: spironolactone 25 mg po  - completed: albumin human solution 5% iv, sodium chloride 0.9 % bolus 4500 mg iv  - BUN/Cr: 25.6 - pre-renal (6/2/25), FENa: 0.2 % - pre-renal (6/1/25)  - creatinine: 0.9 (6/2/25) down from 1.1, high 1.3 (6/1/25)     # Atrial fibrillation  - [id number] score: 4 (6/2/25), moderate-high risk of stroke (4.8% per year)  - currently on: heparin 100 - 4000 units/hr iv Titrated continuously  - aPTT: 78 (6/2/25)  - heart rate range:  (since 6/1/25 15:40)  - continue home: metoprolol tartrate 12.5 mg po BID  - discontinued: apixaban 5 mg po  - cardiac telemetry     # Anemia: chronic, downtrending, macrocytic  - home: cyanocobalamin 1000 mcg po  - hemoglobin: 9.7 down from 10.8 (6/1/25)  - mean corpuscular volume: 107 (6/1/25)  - continue home: cyanocobalamin 1000 mcg po daily  - currently on: folic acid 1 mg po daily     # Alcohol use  To telemetry under [facility]  CIWA protocol  Ativan as needed  Multivitamins p.o. next following day  repeat labs in a.m.  - start: thiamine 100 mg po daily (due 06/05/25)  - currently on: thiamine 200 mg iv q8h  - currently on: folic acid 1 mg po daily  - discontinued: lorazepam 1 mg iv     # Thrombocytopenia  - Platelets: 149 1000/uL (6/1/25)  Continue with home meds  bp parameters for hypotension    #Staph Epi bacteremia  Patient has positive blood cultures in one out of two bottles  Most likely contaminant, but in this patient with multiple co morbid conditions, would request ID consult   -repeat blood cultures   -continue with current antibiotics      VTE Assessment: Padua    VTE Prophylaxis:   Current anticoagulants:  heparin (porcine) bolus from bag 3,500-7,050 Units, intravenous, q6h PRN  heparin infusion in D5W 100 units/mL, intravenous, Titrated      Code Status: DNR (A.N.D.)      Estimated Discharge Date: 6/4/2025   Disposition Planning:  Pending progression and improvement           Sincere Rankin MD  6/2/2025

## 2025-06-02 NOTE — ASSESSMENT & PLAN NOTE
- Debility likely multifactorial in the setting of HF/COPD/PF, PNA, new concern for metastatic disease.    - Living situation prior to [place], visiting for [employer] graduation.   - Baseline functional status is independent, recently more SOB and weak.   - Current Palliative Performance Status: 30%  - Baseline Palliative Performance Status: 80%   - Patient remains high risk for further deterioration and functional decline.    Plan:  - Palliative to follow as hospitalization evolves  - Further goals pending oncology consult.

## 2025-06-02 NOTE — CONSULTS
Pulmonary Consult Note     Indication for Consultation:  Precedex    Requesting Physician:  Win    Primary Care Physician:  Mendez Montaño    Pulmonologist:  ([facility])     HISTORY OF PRESENT ILLNESS        79-year-old male with a significant past medical history of COPD, chronic hypoxia on 2 L/min O2, mixed systolic and diastolic CHF, history of lung cancer, hypertension, diabetes, hyperlipidemia, coronary artery disease, atrial fibrillation, chronic anemia thrombocytopenia.  Patient presents to the emergency department with shortness of breath worsening over the past 3 days prior to presentation.  He has been visiting his family from [place].  Patient reports cough as well.  His initial workup in the emergency department demonstrated worsening hypoxia, now on 15 L liters per minute nasal cannula, leukocytosis, hyponatremia, and CT a which demonstrated extensive bilateral groundglass opacities with background fibrosis, right lower lobe consolidation with adenopathy, multiple liver lesions.    PAST MEDICAL AND SURGICAL HISTORY        Past Medical History:   Diagnosis Date    A-fib (CMS/[facility])     Anemia     CHF (congestive heart failure) (CMS/[facility])     COPD (chronic obstructive pulmonary disease) (CMS/[facility])     Hyperlipidemia     Kidney disease     Type 2 diabetes mellitus (CMS/[facility])      No past surgical history on file.     MEDICATIONS        Home Medications:   acetaminophen (TYLENOL) tablet 650 mg  650 mg oral q6h PRN    alum-mag hydroxide-simeth (MAALOX) 200-200-20 mg/5 mL suspension 30 mL  30 mL oral q4h PRN    azithromycin (ZITHROMAX) IVPB 500 mg in 250 mL NSS vial in bag  500 mg intravenous q24h INT    benzonatate (TESSALON) capsule 100 mg  100 mg oral 3x daily PRN    cefTRIAXone (ROCEPHIN) IVPB 1 g in 100 mL NSS vial in bag  1 g intravenous q24h INT    cyanocobalamin (VITAMIN B12) tablet 1,000 mcg  1,000 mcg oral Daily    glucose chewable tablet 15-30 g of dextrose  15-30 g of  dextrose oral PRN    Or    dextrose 40 % oral gel 15-30 g of dextrose  15-30 g of dextrose oral PRN    Or    glucagon (GLUCAGEN) injection 1 mg  1 mg intramuscular PRN    Or    dextrose 50 % in water (D50) injection 12.5 g  25 mL intravenous PRN    diazePAM (VALIUM) injection 2.5 mg  2.5 mg intravenous q2h PRN    Or    diazePAM (VALIUM) injection 2 mg  2 mg intravenous q1h PRN    Or    diazePAM (VALIUM) injection 5 mg  5 mg intravenous q30 min PRN    diphenhydrAMINE (BENADRYL) capsule 25 mg  25 mg oral q6h PRN    famotidine (PEPCID) tablet 20 mg  20 mg oral BID    folic acid (FOLVITE) tablet 1 mg  1 mg oral Daily    gabapentin (NEURONTIN) capsule 300 mg  300 mg oral TID    heparin (porcine) bolus from bag 3,500-7,050 Units  40-80 Units/kg (Adjusted) intravenous q6h PRN    heparin infusion in D5W 100 units/mL  100-4,000 Units/hr intravenous Titrated    insulin lispro U-100 (HumaLOG) pen 2-10 Units  2-10 Units subcutaneous TID with meals    ipratropium-albuteroL (DUO-NEB) 0.5-2.5 mg/3 mL nebulizer solution 3 mL  3 mL nebulization q6h RUTHY    lisinopriL (PRINIVIL) tablet 10 mg  10 mg oral Daily    metoprolol tartrate (LOPRESSOR) tablet 25 mg  25 mg oral BID    pantoprazole (PROTONIX) injection 40 mg  40 mg intravenous q24h    pravastatin (PRAVACHOL) tablet 20 mg  20 mg oral Daily    sodium chloride tablet 1 g  1 g oral TID with meals    thiamine (B-1) injection 200 mg  200 mg intravenous q8h INT    Followed by    [START ON 6/5/2025] thiamine (VITAMIN B1) tablet 100 mg  100 mg oral Daily    traMADoL (ULTRAM) tablet 50 mg  50 mg oral q6h PRN       Current Medications:   azithromycin  500 mg intravenous q24h INT    cefTRIAXone  1 g intravenous q24h INT    cyanocobalamin  1,000 mcg oral Daily    famotidine  20 mg oral BID    folic acid  1 mg oral Daily    gabapentin  300 mg oral TID    insulin lispro U-100  2-10 Units subcutaneous TID with meals    ipratropium-albuteroL  3 mL nebulization q6h RUTHY    lisinopriL  10 mg oral  "Daily    metoprolol tartrate  25 mg oral BID    pantoprazole  40 mg intravenous q24h    pravastatin  20 mg oral Daily    sodium chloride  1 g oral TID with meals    thiamine  200 mg intravenous q8h INT    Followed by    [START ON 6/5/2025] thiamine  100 mg oral Daily         ALLERGIES        Metformin hcl    FAMILY HISTORY        No family history on file.    SOCIAL HISTORY        Social History     Socioeconomic History    Marital status:    Tobacco Use    Smoking status: Some Days     Types: Cigars    Smokeless tobacco: Never   Substance and Sexual Activity    Alcohol use: Never    Drug use: Never     Social Drivers of Health     Food Insecurity: No Food Insecurity (6/1/2025)    Hunger Vital Sign     Worried About Running Out of Food in the Last Year: Never true     Ran Out of Food in the Last Year: Never true       REVIEW OF SYSTEMS        A Full 10 point review of systems was obtained and is negative then otherwise stated in the HPI    PHYSICAL EXAMINATION      Vital Signs:   Visit Vitals  BP (!) 90/54   Pulse 93   Temp (!) 35 °C (95 °F) (Axillary)   Resp (!) 28   Ht 1.854 m (6' 1\")   Wt 99.8 kg (220 lb)   SpO2 97%   BMI 29.03 kg/m²       I/O's:    Intake/Output Summary (Last 24 hours) at 6/2/2025 1046  Last data filed at 6/1/2025 2346  Gross per 24 hour   Intake 850 ml   Output 1000 ml   Net -150 ml       General: The patient is in no acute distress.  Resting comfortably in bed.  HEENT: Mucous membranes are moist.  Sclera are anicteric.  Neck: Supple.  No cervical lymphadenopathy  Cardiovascular: S1 and S2 are present.  There are no murmurs.  Lungs: Bilateral crackles  Abdomen: Soft, nontender and nondistended  Extremities: Trace bilateral lower extremity pitting edema  Neuro: Awake and alert.  Spontaneously moving all extremities    Diagnostic Data      Labs:    I have personally reviewed all pertinent patient laboratory results. Labs of note discussed below:    ABG Results         06/01/25 06/01/25     " 2050 1617    pH -- 7.43    pCO2 -- 29    pO2 -- 49    HCO3 -- 21.3    Base Excess -- -4.3    Source Of Oxygen nasal cannula 2L NC          BMP Results         06/02/25 06/01/25     0603 1418     127      126    K 3.9 3.9      3.9    Cl 100 94      94    CO2 23 19      22    Glucose 228 273      275    BUN 23 33      33    Creatinine 0.9 1.1      1.3    Calcium 8.8 9.2      9.2    Anion Gap 9 14      10    EGFR >60.0 >60.0      55.5           Comment for K at 0603 on 06/02/25: Results obtained on plasma. Plasma Potassium values may be up to 0.4 mEQ/L less than serum values. The differences may be greater for patients with high platelet or white cell counts.    Comment for K at 1418 on 06/01/25: Results obtained on plasma. Plasma Potassium values may be up to 0.4 mEQ/L less than serum values. The differences may be greater for patients with high platelet or white cell counts.    Comment for K at 1418 on 06/01/25: Results obtained on plasma. Plasma Potassium values may be up to 0.4 mEQ/L less than serum values. The differences may be greater for patients with high platelet or white cell counts.    Comment for EGFR at 0603 on 06/02/25: Calculation based on the Chronic Kidney Disease Epidemiology Collaboration (CKD-EPI) equation refit without adjustment for race.    Comment for EGFR at 1418 on 06/01/25: Calculation based on the Chronic Kidney Disease Epidemiology Collaboration (CKD-EPI) equation refit without adjustment for race.    Comment for EGFR at 1418 on 06/01/25: Calculation based on the Chronic Kidney Disease Epidemiology Collaboration (CKD-EPI) equation refit without adjustment for race.          CBC Results         06/01/25     1418    WBC 25.23    RBC 3.11    HGB 10.8    HCT 33.2    .8    MCH 34.7    MCHC 32.5                  Micro:   Microbiology Results       Procedure Component Value Units Date/Time    SARS-COV-2 (COVID-19)/ FLU A/B, AND RSV, PCR Nasopharynx [[id number]]  (Normal)  Collected: 06/01/25 1419    Specimen: Nasopharyngeal Swab from Nasopharynx Updated: 06/01/25 1502     SARS-CoV-2 (COVID-19) Negative     Influenza A Negative     Influenza B Negative     Respiratory Syncytial Virus Negative    Narrative:      Testing performed using real-time PCR for detection of COVID-19. EUA approved validation studies performed on site.               Imaging:    I have personally reviewed all pertinent imaging which is discussed below:  Radiology Images  Recent Results (from the past 6 weeks)   X-RAY CHEST 1 VIEW    Collection Time: 06/01/25  2:44 PM    Narrative    CLINICAL HISTORY: sob    COMMENT: Frontal view of the chest obtained.    COMPARISON: None available.    LINES/TUBES: None.    OTHER: Mild pulmonary edema with trace bilateral pleural effusions suggestive of  mild CHF. Superimposed patchy airspace opacities, most significant in the lower  lobes, suspicious for superimposed multifocal pneumonia. No definite  pneumothorax or large pleural effusion. Cardiomediastinal silhouette mostly  obscured and not well evaluated.      Impression    IMPRESSION: Please see comment. Recommend follow-up radiographs or chest CT in  6-8 weeks.       CT ANGIOGRAPHY CHEST/ABDOMEN/PELVIS WITH AND WITHOUT IV CONTRAST    Collection Time: 06/01/25  4:56 PM    Narrative    CLINICAL HISTORY: Shortness of breath      Impression    IMPRESSION:  1.  No evidence of pulmonary embolus or acute aortic syndrome.  2.  Extensive bilateral groundglass opacity on a background of fibrotic  interstitial lung disease which may represent acute exacerbation of chronic ILD  or a new infectious, inflammatory, hemorrhagic or edema from congestive heart  failure.  3.  Right lower lobe lung consolidation with associated mediastinal and hilar  adenopathy which may represent infection or neoplasm.  4.  Multiple liver lesions suggesting metastatic disease.  5.  Multiple other chronic findings.      COMMENT:  Technique: Computed tomography  angiography of the chest, abdomen and pelvis was  performed from the thoracic inlet through the pelvis using a dissection protocol  without and with utilizing 100 cc Isovue-370.    3D MIP and/or volume rendered image reconstruction performed and reviewed.    CT DOSE:  One or more dose reduction techniques (e.g. automated exposure  control, adjustment of the mA and/or kV according to patient size, use of  iterative reconstruction technique) utilized for this examination.    Comparisons studies: Chest x-ray dated the same day.    Lung parenchyma: Extensive bilateral patchy groundglass opacity, peripheral  reticulation, mild bronchial dilatation and minimal architectural distortion.  There is right lower lobe consolidation    Thyroid:  Normal.    Mediastinum: Mildly prominent lymph nodes the largest a subcarinal node  measuring up to 2.6 cm.    Dariana: Mildly prominent bilateral lymph nodes.    Heart and pericardium: Severe coronary calcification.  Cardiomegaly.    Pulmonary artery: Normal    Aorta: Normal    Chest wall and pleura: Normal.    Axilla: Normal.    Liver: Extensive liver lesions the largest measures up to 4.3 cm.    Spleen: Normal.    Adrenals: Normal.    Pancreas: Normal.    Kidneys, ureters and bladder: Normal.    Gallbladder: Removed    Retroperitoneal structures: Normal.    Bowel and mesentery: No inflammatory or obstructive process.  Colonic  diverticuli.    Vessels: Aortoiliac atherosclerotic disease.  No evidence of acute aortic  syndrome.    Lymph nodes: None enlarged.    Pelvic structures:  Normal.    Bones: Degenerative change.             Pulmonary Function Tests:  Unavailable    Sleep Studies:  Unknown    ASSESSMENT AND RECOMMENDATIONS         # Acute on chronic hypoxic respiratory failure  - Baseline 2 L/min, increased to 4 L/min on admission, now 15 L/min.  - Continue to wean oxygen, with goal SpO2 89-92%  - VBG without hypercapnia    # Multifocal pneumonia, community-acquired  - Given hypoxia,  leukocytosis, infiltrates, recommend treating for pneumonia  - Started on ceftriaxone-azithromycin.  Will treat for 5-3 days respectively  - Sputum culture if able to expectorate  - Trend temperature, white blood cell count    #Acute on chronic systolic and diastolic congestive heart failure  - Agree with cautious diuresis  - Minimal fluid overload peripherally, but CT and hyponatremia are suggestive of fluid overload state  - Monitor renal function, I's and O's, blood pressure  - Consider echocardiogram    #Elevated troponin, possible NSTEMI  - Heparin infusion  - Cardiology consultation    #COPD-pulmonary fibrosis  - Continue triple therapy inhaler, with albuterol as needed  - Target SpO2 89-92%  - If worsening, add Solu-Medrol    #Encephalopathy  - No significant agitation, would not use Precedex    40 minutes critical care time spent in the direct care of this patient, review of data, imaging, examination, formulation of a management plan, and coordination of care with the team, consultants, nursing and respiratory therapy.       Thank you for the consultation. We will continue to follow along closely with you.       Raul Ward MD  Pulmonary & Critical Care Medicine   6/2/2025  10:46 AM

## 2025-06-02 NOTE — ASSESSMENT & PLAN NOTE
79 y.o. with a PMH of HF, COPD/PF admitted for sepsis w/ ADHF in s/o multifocal PNA, imaging with multiple liver lesions and concern for metastatic dx    - Code status: Do Not Resuscitate / Allow Natural Death. Discussed today.  - Prognosis: guarded, pending oncology consult and work-up  - Advance Directives: yes, not on file  - Surrogate Decision Maker: designates his son Kendrick  - Capacity intact   - Support: 2 sons, DIL grant, grandchildren

## 2025-06-02 NOTE — CONSULTS
Reason for Consultation: dyspnea    Provider Requesting Consultation: Dr Rankin    Primary Cardiologist: none at ; Dr Niño at the [facility] in [facility]    History of Present Illness: Patient is an 79-year-old  male with a past medical history significant for essential hypertension, presumed coronary artery disease with an abnormal stress test from November 2024, heart failure with moderately reduced EF, moderate mitral regurgitation and severe pulmonary hypertension, persistent atrial fibrillation for which he has been maintained on Eliquis, COPD for which he was to be on O2, and exertional dyspnea who presented to [facility] secondary to progressive shortness of breath.  He denied any chest pain palpitations, syncope or presyncopal like symptoms.  He was noted to have intermittent dry heaving in the emergency room associated with nausea.    He recently came to the area to visit family.  Grandson is graduating from high school.  Family had been concerned secondary to progressive shortness of breath poor appetite and less ambulation.      In the emergency room, he was noted to be hypotensive with a blood pressure of 82/56. He was hypoxic with O2 requirements increasing such that he is now on 15 liters of O2 high flow NC. He was found to have a leukocytosis with a white count of 25.23 and a left shift.  He was hyponatremic with a serum sodium of 126.  He had a mild transaminitis with an AST ALT of 75 and 78.  His BNP was 1100.  He had a lactic acid level of 2.3.  And troponins were noted to be elevated at 351, and subsequent 256.    EKG showed A-fib with a controlled ventricular response and diffuse T wave inversions anterolaterally.  He was also noted to have subtle ST depression laterally.    Chest x-ray on admission documented mild pulmonary edema with trace bilateral pleural effusions suggestive of mild CHF.  Superimposed patchy air space opacities most significantly in the   lower lobes were suspicious for superimposed multifocal pneumonia.    CTA showed no evidence for pulmonary embolus but extensive bilateral groundglass opacities on the background of fibrotic interstitial lung disease which may represent an acute exacerbation of chronic ILD or new infectious inflammatory or hemorrhagic edema from congestive heart failure.  There is right lower lobe consolidation with associated mediastinal and hilar adenopathy which may represent infection or neoplasm and multiple liver lesions suggesting metastatic disease.    He was gvien IV albumin, zithromax/ceftriaxone, started on IV heparin, and was given 500 cc normal saline      Cardiology was asked to see the patient secondary to his dyspnea and +troponins.        TTE 1/10/25:  Moderately reduced LV systolic function with ejection fraction of 40% with moderate mitral regurgitation and moderately dilated left atrium and moderate tricuspid regurgitation with moderately dilated right atrium with severe pulmonary hypertension with pulmonary systolic pressure of 71 mm with 4.3 cm ascending aortic aneurysm     11/2024:   - The post stress calculated left ventricular ejection fraction is 45%.     - This was an abnormal study with small inferior infarct with mild apical ischemia with normalTID   Review of Systems:   Constitutional: Denies fevers,   positive for chills and malaise, positive for 30 pound weight loss in the last 3 months  eyes: Denies acute changes in vision  Nose: Denies epistaxis  Cardiovascular: Positive for shortness of breath and dyspnea on exertion, positive for dry cough, negative for orthopnea or PND  Respiratory: Denies wheezing, hemoptysis  Gastrointestinal: Denies changes in bowel habit, nausea, emesis, hematemesis, hematochezia, melena  Genitourinary: Denies dysuria, urinary frequency, urgency, or hematuria  Musculoskeletal: Denies myalgias or arthralgias  Neurologic:  Denies focal neurologic deficits suggestive of TIA/CVA,  "recurrent falls, dizziness  Hematologic: Denies unusual bruising or bleeding  Integumentary: Denies unusual rashes    Medications:    azithromycin  500 mg intravenous q24h INT    cefTRIAXone  1 g intravenous q24h INT    cyanocobalamin  1,000 mcg oral Daily    famotidine  20 mg oral BID    folic acid  1 mg oral Daily    gabapentin  300 mg oral TID    insulin lispro U-100  2-10 Units subcutaneous TID with meals    ipratropium-albuteroL  3 mL nebulization q6h RUTHY    lisinopriL  10 mg oral Daily    metoprolol tartrate  25 mg oral BID    pantoprazole  40 mg intravenous q24h    pravastatin  20 mg oral Daily    sodium chloride  1 g oral TID with meals    thiamine  200 mg intravenous q8h INT    Followed by    [START ON 6/5/2025] thiamine  100 mg oral Daily       Allergies: Metformin hcl    Medical History:   Past Medical History:   Diagnosis Date    A-fib (CMS/[facility])     Anemia     CHF (congestive heart failure) (CMS/[facility])     COPD (chronic obstructive pulmonary disease) (CMS/[facility])     Hyperlipidemia     Kidney disease     Type 2 diabetes mellitus (CMS/[facility])        Surgical History: No past surgical history on file.    Social History: The patient denies alcohol, nicotine, or recreational drug abuse.    Family History: The patient denies a family history of premature ischemic heart disease or sudden cardiac death.    Physical Exam:  Constitutional: Vitals: Visit Vitals  BP (!) 90/54   Pulse 93   Temp (!) 35 °C (95 °F) (Axillary)   Resp (!) 28   Ht 1.854 m (6' 1\")   Wt 99.8 kg (220 lb)   SpO2 97%   BMI 29.03 kg/m²     General: disheveled, ill-appearing WM lying in stretcher  Eyes: No conjunctival pallor.  Sclera anicteric,  Mouth: Mucus membranes dry  Lung: diminshed bs at bases  Heart : irregularly irregular, distant hs  Abdomen: Soft, NT, ND.  Bowel sound present.  Skin: Warm.  No unusual rashes are noted.  Neuro: Alert and oriented x3. Affect appropriate.  Musculoskeletal: Neck supple.    Lab Results:  Results from last 7 days " "  Lab Units 06/02/25  0603 06/01/25  1418   SODIUM mEQ/L 132* 127*  126*   POTASSIUM mEQ/L 3.9 3.9  3.9   CHLORIDE mEQ/L 100 94*  94*   CO2 mEQ/L 23 19*  22   BUN mg/dL 23 33*  33*   CREATININE mg/dL 0.9 1.1  1.3   AST IU/L 53* 75*   ALT IU/L 49 70*     Results from last 7 days   Lab Units 06/01/25  1626 06/01/25  1418   WBC K/uL  --  25.23*   HEMOGLOBIN g/dL  --  10.8*   HEMATOCRIT %  --  33.2*   PLATELETS K/uL  --  149*   INR  1.6 1.5     No results found for: \"HGBA1C\", \"TSH\"  No results found for: \"CHOL\", \"LDLCALC\", \"HDL\", \"TRIG\"  Lab Results   Component Value Date    BNP 1,103 (H) 06/01/2025     ECG:  as above    Pertinent Studies: as above      Impressions and Recommendations:     Unfortunate 79 year-old  man who presented to  with dyspnea/hypoxemia, hypotension and hyponatremia/lactic acidosis.  He was found to have evidence of metastatic disease of his liver with recent 30 lb weight loss.  Cardiology was asked to see him for his dyspnea/+troponins.    Dyspnea/hypoxemia - this is multifactorial and related to his underlying lung disease (evidence of ILD by CT scan), pneumonia and possible metastatic disease.  While he has underlying HFmEF at baseline, do not believe that his dyspnea is primarily cardiac.  Given his hypotension, low Na urine, and improvement in his serum sodium with saline/albumen would hold diuretics for now.  He is NOT on standing dose LOOP diuretic as outpatient.- Would strongly consider pulmonary consult  +troponin - he has evidence of inferior infarct with mild ada-infarct ischemia by stress testing from 11/2024.  Suspect his underlying illness precipitated some degree of demand ischemia.  Troponins are trending downward.  Hold beta blockade given his hypotension.  Systemic anticoagulation - he had been on eliquis in [facility], although it is not listed on his med list.  Will need to research to see if it was d/sandi prior to his presentation.  In interim, apixaban is on hold " given his acute illness.  He is on IV heparin for now.  HFmEF - by TTE with moderate MR and moderate TR with severe pulmonary hypertension - presumably related to his underlying lung disease. GDMT on hold given hypotension.    Metastatic disease - heme/onc to see patient.      CC time spent 45 min    Camille Anguiano MD  6/2/2025     Mendez Montaño

## 2025-06-02 NOTE — ASSESSMENT & PLAN NOTE
Patient reported history of presumed lung cancer (likely right lower lobe with no tissue diagnosis prior to radiotherapy in palliative setting) raises very high concern for metastatic disease now involving the liver.  -Recommend biopsy of one of the liver lesions (I have placed IR consult request for this) to establish diagnosis as well as obtain tissue for PD-L1 expression as well as NexGen sequencing to help delineate potential palliative therapy options.  -Currently patient is too sick for any meaningful antineoplastic therapy however would anticipate improvement with treatment of nononcogenic lung issues I.e. ILD versus COPD?  Defer to primary team regarding management of other confounding factors including heart failure with volume overload, questionable NSTEMI etc.    We will follow along while patient is hospitalized.  Once biopsy is completed patient can be considered for discharge if otherwise stable and he can have outpatient follow-up for rest of the workup/management.  -Please call in case there are urgent questions or concerns.  Thanks

## 2025-06-02 NOTE — CONSULTS
Hematology/Oncology -  [facility]     Consult Note    Subjective     Juaquin Linda is a 79 y.o. male who was admitted for Sepsis (CMS/HCC) [A41.9]. Patient was referred by primary team for management recommendations. Patient is 79-year-old WM with multiple chronic health issues as well as personal history of lung cancer (reports not having tissue sampling/biopsy; treated with palliative radiation finishing treatment in January 2025) who recently moved here from [place] primarily due to worsening health (was living alone in [place] and managing well with ADLs previously).  He is now admitted after presenting to the ED with progressive shortness of breath, anorexia and generalized weakness/debility.  Patient with significant hypoxia upon presentation doing better on high flow O2 supplementation via nasal cannula.  Imaging significant for extensive bilateral groundglass opacities concerning for fibrosis/ILD as well as right lower lobe consolidation with mediastinal and hilar adenopathy.  Multifocal malignant appearing liver lesions highly concerning for metastatic disease.    Unable to review outside records.    Past Medical History:   Diagnosis Date    A-fib (CMS/HCC)     Anemia     CHF (congestive heart failure) (CMS/HCC)     COPD (chronic obstructive pulmonary disease) (CMS/HCC)     Hyperlipidemia     Kidney disease     Type 2 diabetes mellitus (CMS/HCC)        No past surgical history on file.    Social History     Socioeconomic History    Marital status:    Tobacco Use    Smoking status: Some Days     Types: Cigars    Smokeless tobacco: Never   Substance and Sexual Activity    Alcohol use: Never    Drug use: Never     Social Drivers of Health     Food Insecurity: No Food Insecurity (6/1/2025)    Hunger Vital Sign     Worried About Running Out of Food in the Last Year: Never true     Ran Out of Food in the Last Year: Never true       No family history on file.    Metformin hcl    Current  Facility-Administered Medications   Medication Dose Route Frequency Provider Last Rate Last Admin    acetaminophen (TYLENOL) tablet 650 mg  650 mg oral q6h PRN Wandy Walden MD   650 mg at 06/02/25 0812    alum-mag hydroxide-simeth (MAALOX) 200-200-20 mg/5 mL suspension 30 mL  30 mL oral q4h PRN Wandy Walden MD        azithromycin (ZITHROMAX) IVPB 500 mg in 250 mL NSS vial in bag  500 mg intravenous q24h INT Wandy Walden MD   Stopped at 06/02/25 1752    benzonatate (TESSALON) capsule 100 mg  100 mg oral 3x daily PRN Wandy Walden MD        cefTRIAXone (ROCEPHIN) IVPB 1 g in 100 mL NSS vial in bag  1 g intravenous q24h INT Wandy Walden MD   Stopped at 06/02/25 1752    cyanocobalamin (VITAMIN B12) tablet 1,000 mcg  1,000 mcg oral Daily Sincere Rankin MD   1,000 mcg at 06/02/25 1156    glucose chewable tablet 15-30 g of dextrose  15-30 g of dextrose oral PRN Wandy Walden MD        Or    dextrose 40 % oral gel 15-30 g of dextrose  15-30 g of dextrose oral PRN Wandy Walden MD        Or    glucagon (GLUCAGEN) injection 1 mg  1 mg intramuscular PRN Wandy Walden MD        Or    dextrose 50 % in water (D50) injection 12.5 g  25 mL intravenous PRN Wandy Walden MD        diazePAM (VALIUM) injection 2.5 mg  2.5 mg intravenous q2h PRN Wandy Walden MD        Or    diazePAM (VALIUM) injection 2 mg  2 mg intravenous q1h PRN Wandy Walden MD        Or    diazePAM (VALIUM) injection 5 mg  5 mg intravenous q30 min PRN Wandy Walden MD        diphenhydrAMINE (BENADRYL) capsule 25 mg  25 mg oral q6h PRN Wandy Walden MD        famotidine (PEPCID) tablet 20 mg  20 mg oral Nightly Sincere Rankin MD        folic acid (FOLVITE) tablet 1 mg  1 mg oral Daily Wandy Walden MD   1 mg at 06/02/25 0811    gabapentin (NEURONTIN) capsule 300 mg  300 mg oral Nightly Sincere Rankin MD        guaiFENesin (MUCINEX) 12 hr ER tablet 600 mg  600 mg oral BID Sincere Rankin MD         heparin (porcine) bolus from bag 3,500-7,050 Units  40-80 Units/kg (Adjusted) intravenous q6h PRN Chele Castellon MD        heparin infusion in D5W 100 units/mL  100-4,000 Units/hr intravenous Titrated Chele Castellon MD 13.5 mL/hr at 06/02/25 1041 1,350 Units/hr at 06/02/25 1041    insulin glargine U-100 (LANTUS/BASAGLAR) pen 12 Units  12 Units subcutaneous Nightly Sincere Rankin MD   12 Units at 06/02/25 1223    insulin lispro U-100 (HumaLOG) pen 2-10 Units  2-10 Units subcutaneous TID with meals Wandy Walden MD   6 Units at 06/02/25 1202    ipratropium-albuteroL (DUO-NEB) 0.5-2.5 mg/3 mL nebulizer solution 3 mL  3 mL nebulization q6h RUTHY Wandy Walden MD   3 mL at 06/01/25 2352    lisinopriL (PRINIVIL) tablet 10 mg  10 mg oral Daily Sincere Rankin MD        metoprolol tartrate (LOPRESSOR) tablet 12.5 mg  12.5 mg oral BID Sincere Rankin MD   12.5 mg at 06/02/25 1205    mometasone-formoterol (DULERA 200) 200-5 mcg/actuation inhaler 2 puff  2 puff inhalation BID (8a, 8p) Sincere Rankin MD   2 puff at 06/02/25 1635    And    tiotropium bromide (SPIRIVA RESPIMAT) 2.5 mcg/actuation inhaler 2 puff  2 puff inhalation Daily (8a) Sincere Rankin MD   2 puff at 06/02/25 1635    pantoprazole (PROTONIX) injection 40 mg  40 mg intravenous q24h Wandy Walden MD   40 mg at 06/02/25 0811    pravastatin (PRAVACHOL) tablet 20 mg  20 mg oral Daily Sincere Rankin MD   20 mg at 06/02/25 1156    sodium chloride tablet 1 g  1 g oral TID with meals Wandy Walden MD   1 g at 06/02/25 1636    thiamine (B-1) injection 200 mg  200 mg intravenous q8h INT Wandy Walden MD   200 mg at 06/02/25 1042    Followed by    [START ON 6/5/2025] thiamine (VITAMIN B1) tablet 100 mg  100 mg oral Daily Wandy Walden MD        traMADoL (ULTRAM) tablet 50 mg  50 mg oral q6h PRN Sincere Rankin MD   50 mg at 06/02/25 1042     Current Outpatient Medications   Medication Sig Dispense Refill    cyanocobalamin (VITAMIN  B12) 1,000 mcg tablet Take 1,000 mcg by mouth daily.      doxycycline monohydrate (ADOXA) 100 mg tablet Take 100 mg by mouth 2 (two) times a day.      gabapentin (NEURONTIN) 300 mg capsule Take 300 mg by mouth nightly.      glipiZIDE (GLUCOTROL) 5 mg tablet Take 5 mg by mouth 2 (two) times a day with breakfast and dinner. (Patient taking differently: Take 5 mg by mouth daily with breakfast.)      insulin glargine U-100 (LANTUS SOLOSTAR U-100 INSULIN) 100 unit/mL (3 mL) pen Inject 12 Units under the skin every morning.      lisinopriL (PRINIVIL) 10 mg tablet Take 10 mg by mouth daily.      metoprolol tartrate (LOPRESSOR) 25 mg tablet Take 25 mg by mouth daily.      simvastatin (ZOCOR) 10 mg tablet Take 10 mg by mouth nightly.      spironolactone (ALDACTONE) 25 mg tablet Take 25 mg by mouth daily.      TRELEGY ELLIPTA 200-62.5-25 mcg blister with device powder for inhalation Inhale 1 puff daily.      famotidine (PEPCID) 20 mg tablet Take 20 mg by mouth nightly as needed for heartburn.         Review of Systems  All other systems reviewed and negative except as noted in the HPI.    Vital signs in last 24 hours:  Temp:  [35 °C (95 °F)-37.3 °C (99.1 °F)] 36.3 °C (97.3 °F)  Heart Rate:  [] 100  Resp:  [16-57] 26  BP: ()/(50-69) 110/54  FiO2 (%) (Set):  [70 %] 70 %    Objective     Physical Exam  GEN: Patient is  not in acute distress but tachypneic needing supplemental O2  HEENT: Mucous membranes are moist.  Sclera is anicteric.  Pallor also noted  Neck: Supple.  No cervical lymphadenopathy  Cardiovascular: Tachycardic with regular rhythm  Lungs: Increased work of breathing at rest while conversing despite supplemental O2  Abdomen: Soft, nontender and nondistended  Extremities: Trace bilateral lower extremity pitting edema  Neuro: Awake and alert.  Spontaneously moving all extremities and no concerns for focal deficit  Psych: Calm and cooperative  Skin: No rashes or large bruises.      Labs  Recent Results  (from the past 24 hours)   Lactic Acid (Repeat)    Collection Time: 06/01/25  7:36 PM   Result Value Ref Range    Lactate 2.4 (H) 0.4 - 2.0 mmol/L   HIGH SENSITIVE TROPONIN I (NO REFLEX)    Collection Time: 06/01/25  7:36 PM   Result Value Ref Range    High Sens Troponin I 308.3 (HH) <15.0 pg/mL   UA Reflex to Culture (Macroscopic)    Collection Time: 06/01/25  8:26 PM    Specimen: Urine, Clean Catch   Result Value Ref Range    Color, Urine Yellow Yellow, Colorless    Clarity, Urine Clear Clear    Specific Gravity, Urine 1.035 (H) 1.005 - 1.030    pH, Urine 6.0 4.5 - 8.0    Leukocyte Esterase Negative Negative    Nitrite, Urine Negative Negative    Protein, Urine Negative Negative    Glucose, Urine Negative Negative mg/dL    Ketones, Urine Negative Negative mg/dL    Urobilinogen, Urine 0.2 <2.0 EU/dL EU/dL    Bilirubin, Urine Negative Negative mg/dL    Blood, Urine Negative Negative   Creatinine, urine, random    Collection Time: 06/01/25  8:26 PM   Result Value Ref Range    Creatinine, Urine 49.9 mg/dL   Electrolytes, urine random    Collection Time: 06/01/25  8:26 PM   Result Value Ref Range    Sodium, Ur <10 mEQ/L    Potassium, Ur 15.1 mEQ/L    Chloride, Ur <15 mEQ/L   Osmolality, urine    Collection Time: 06/01/25  8:26 PM   Result Value Ref Range    Osmolality, Ur 341 100 - 1,400 mOsm/kg   Drug screen panel, urine    Collection Time: 06/01/25  8:26 PM   Result Value Ref Range    PCP Scrn, Ur Not Detected Not Detected    Benzodiazepine Ur Qual Not Detected Not Detected    Cocaine Screen, Urine Not Detected Not Detected    Amphetamine+Methamphetamine Screen, Ur Not Detected Not Detected    Cannabinoid Screen, Urine Not Detected Not Detected    Opiate Scrn, Ur Not Detected Not Detected    Barbiturate Screen, Ur Not Detected Not Detected    Fentanyl Screen, Urine Not Detected Not Detected   Blood Gas+Lactate, Venous    Collection Time: 06/01/25  8:50 PM   Result Value Ref Range    pH, Venous 7.35 7.32 - 7.42     pCO2, Venous 43 41 - 51 mm Hg    pO2, Venous <20 (L) 25 - 40 mm Hg    HCO3, Venous 22.3 21.0 - 28.0 mEQ/L    Base Excess, Venous -1.8 mEQ/L    TCO2, Venous 25.0 22.0 - 32.0 mEQ/L    Lactate 2.2 (H) 0.4 - 2.0 mmol/L    Source Of Oxygen nasal cannula     FIO2 4L    PTT    Collection Time: 06/01/25 11:42 PM   Result Value Ref Range     (HH) 23 - 35 sec   Lactic Acid (Repeat)    Collection Time: 06/01/25 11:42 PM   Result Value Ref Range    Lactate 1.9 0.4 - 2.0 mmol/L   POCT Glucose    Collection Time: 06/02/25 12:35 AM   Result Value Ref Range    POCT Bedside Glucose 226 (H) 70 - 99 mg/dL    POC Test POC    APTT    Collection Time: 06/02/25  1:58 AM   Result Value Ref Range    PTT 59 (H) 23 - 35 sec   Comprehensive metabolic panel    Collection Time: 06/02/25  6:03 AM   Result Value Ref Range    Sodium 132 (L) 136 - 145 mEQ/L    Potassium 3.9 3.5 - 5.1 mEQ/L    Chloride 100 98 - 107 mEQ/L    CO2 23 21 - 31 mEQ/L    BUN 23 7 - 25 mg/dL    Creatinine 0.9 0.7 - 1.3 mg/dL    Glucose 228 (H) 70 - 99 mg/dL    Calcium 8.8 8.6 - 10.3 mg/dL    AST (SGOT) 53 (H) 13 - 39 IU/L    ALT (SGPT) 49 7 - 52 IU/L    Alkaline Phosphatase 133 (H) 34 - 125 IU/L    Total Protein 6.4 6.0 - 8.2 g/dL    Albumin 3.8 3.5 - 5.7 g/dL    Bilirubin, Total 2.1 (H) 0.3 - 1.2 mg/dL    eGFR >60.0 >=60.0 mL/min/1.73m*2    Anion Gap 9 3 - 15 mEQ/L   Lactate dehydrogenase    Collection Time: 06/02/25  6:03 AM   Result Value Ref Range     (H) 98 - 271 IU/L   POCT Glucose    Collection Time: 06/02/25  8:58 AM   Result Value Ref Range    POCT Bedside Glucose 262 (H) 70 - 99 mg/dL    POC Test POC    APTT    Collection Time: 06/02/25 10:04 AM   Result Value Ref Range    PTT 78 (H) 23 - 35 sec   POCT Glucose    Collection Time: 06/02/25 12:01 PM   Result Value Ref Range    POCT Bedside Glucose 296 (H) 70 - 99 mg/dL    POC Test POC    POCT Glucose    Collection Time: 06/02/25  4:12 PM   Result Value Ref Range    POCT Bedside Glucose 113 (H) 70 - 99  mg/dL    POC Test POC    PTT - STAT in 6 Hours    Collection Time: 06/02/25  4:45 PM   Result Value Ref Range    PTT 76 (H) 23 - 35 sec     No new labs.    Imaging  X-RAY CHEST 1 VIEW  Result Date: 6/2/2025  CLINICAL HISTORY:   Increased oxygen requirement COMMENT: PROCEDURE: Single frontal view of the chest. COMPARISON:   Prior chest x-ray and chest CT dated June 1, 2025 Support lines, tubes, devices, and surgical hardware, material: Monitoring leads/wires overlie the patient. Lungs and Pleura: Diffuse bilateral hazy airspace opacity and patchy consolidation which has progressed. Possible small right pleural effusion. No pneumothorax. Cardiovascular and Mediastinum: The cardiomediastinal silhouette is stable. Cardiomegaly. Other: No acute osseous abnormality.     IMPRESSION: Diffuse bilateral airspace opacity, progressed     I have independently reviewed the pertinent imaging from the last 24 hrs.      Assessment   79 y.o. male being consulted for management recommendations regarding what appears to be metastatic primary lung malignancy (RLL) in background of having been recently treated palliatively with radiotherapy alone.    Assessment & Plan  Sepsis due to Streptococcus species without acute organ dysfunction (CMS/HCC)    Type 2 diabetes mellitus (CMS/HCC)    Kidney disease    COPD (chronic obstructive pulmonary disease) (CMS/HCC)    CHF (congestive heart failure) (CMS/HCC)    Anemia    A-fib (CMS/HCC)    Pneumonia of both lower lobes due to infectious organism    Secondary malignant neoplasm of liver (CMS/HCC)    Sepsis (CMS/HCC)    Debility    Palliative care by specialist    Primary cancer of right lung (CMS/HCC)  Patient reported history of presumed lung cancer (likely right lower lobe with no tissue diagnosis prior to radiotherapy in palliative setting) raises very high concern for metastatic disease now involving the liver.  -Recommend biopsy of one of the liver lesions (I have placed IR consult request  for this) to establish diagnosis as well as obtain tissue for PD-L1 expression as well as NexGen sequencing to help delineate potential palliative therapy options.  -Currently patient is too sick for any meaningful antineoplastic therapy however would anticipate improvement with treatment of nononcogenic lung issues I.e. ILD versus COPD?  Defer to primary team regarding management of other confounding factors including heart failure with volume overload, questionable NSTEMI etc.    We will follow along while patient is hospitalized.  Once biopsy is completed patient can be considered for discharge if otherwise stable and he can have outpatient follow-up for rest of the workup/management.  -Please call in case there are urgent questions or concerns.  Thanks              Lewis Peralta MD

## 2025-06-03 PROBLEM — R16.0 LIVER MASS: Status: ACTIVE | Noted: 2025-01-01

## 2025-06-03 NOTE — PROGRESS NOTES
Infectious Disease Progress Note    Patient Name: Juaquin Linda  MR#: [id number]  : 1945  Admission Date: 2025  Date: 25   Time: 7:33 AM   Author: Jovanni Zuniga MD    Major Events:   No acute events overnight  Patient remains afebrile, HD stable, high flow nasal cannula 10 L/min  Leukocytosis equivocal 27K, thrombocytopenia to 117    Microbiology:   RVP negative   blood cultures 2 of 2 positive for GPC's with [employer] reporting MRSE  / blood cultures pending     Radiology:   chest x-ray pulmonary edema, bilateral pleural effusions, patchy airspace opacities concerning for multifocal pneumonia.   CT angio of the chest, abdomen, pelvis.  No PE.  Extensive bilateral groundglass opacities, right lower lung consolidation with mediastinal/hilar lymphadenopathy.  Hepatic lesions concerning for metastatic disease     Antimicrobial history:  Ceftriaxone -P  Azithromycin -p  Vancomycin 6/3-p    Antibiotics:    Anti-infectives (From admission, onward)      Start     Dose/Rate Route Frequency Ordered Stop    25 1515  azithromycin (ZITHROMAX) IVPB 500 mg in 250 mL NSS vial in bag         500 mg  250 mL/hr over 60 Minutes intravenous Every 24 hours interval 25 1752      25 1515  cefTRIAXone (ROCEPHIN) IVPB 1 g in 100 mL NSS vial in bag         1 g  200 mL/hr over 30 Minutes intravenous Every 24 hours interval 25 1752              Subjective   No new complaints today.  Pt reports breathing feels better today    Review of Systems    All other systems reviewed and negative except as noted in the HPI.    Objective     Vital Signs:    Patient Vitals for the past 72 hrs:   BP Temp Temp src Pulse Resp SpO2 Height Weight   25 0400 -- 36.4 °C (97.5 °F) Oral -- -- -- -- --   25 0337 -- -- -- -- 20 99 % -- --   25 0200 (!) 114/54 -- -- 85 18 96 % -- --   25 0000 (!) 104/53 -- -- 86 18 93 % -- --   25 2343 -- -- -- 77 -- -- -- --   25 8586 -- --  -- -- 18 93 % -- --   06/02/25 2200 (!) 101/58 -- -- 94 18 94 % -- --   06/02/25 2100 (!) 106/59 36.4 °C (97.5 °F) Oral 95 20 98 % -- --   06/02/25 2011 -- -- -- -- 20 96 % -- --   06/02/25 1959 -- -- -- (!) 104 -- -- -- --   06/02/25 1900 (!) 117/58 -- -- (!) 105 20 92 % -- --   06/02/25 1800 (!) 110/54 -- -- 100 (!) 26 96 % -- --   06/02/25 1600 (!) 105/51 -- -- 97 (!) 26 (!) 90 % -- --   06/02/25 1300 (!) 95/50 -- -- (!) 103 (!) 26 94 % -- --   06/02/25 1205 (!) 102/53 -- -- (!) 105 (!) 27 95 % -- --   06/02/25 1200 (!) 102/53 36.3 °C (97.3 °F) Temporal (!) 105 (!) 57 93 % -- --   06/02/25 1153 -- -- -- -- (!) 26 95 % -- --   06/02/25 1035 (!) 90/54 -- -- 93 (!) 28 97 % -- --   06/02/25 1000 (!) 96/52 -- -- 98 (!) 52 97 % -- --   06/02/25 0900 (!) 103/56 -- -- (!) 105 (!) 26 99 % -- --   06/02/25 0800 111/66 (!) 35 °C (95 °F) Axillary (!) 112 (!) 35 97 % -- --   06/02/25 0759 111/66 -- -- (!) 109 20 96 % -- --   06/02/25 0630 (!) 105/55 -- -- (!) 113 20 100 % -- --   06/02/25 0559 -- -- -- -- -- 92 % -- --   06/02/25 0530 (!) 116/57 -- -- (!) 112 -- 95 % -- --   06/02/25 0330 (!) 123/57 -- -- (!) 114 -- 96 % -- --   06/02/25 0300 124/69 -- -- (!) 112 (!) 30 96 % -- --   06/02/25 0236 -- -- -- (!) 112 (!) 24 99 % -- --   06/02/25 0229 138/65 -- -- (!) 110 (!) 30 99 % -- --   06/02/25 0200 (!) 120/58 37.3 °C (99.1 °F) Temporal (!) 108 (!) 28 (!) 83 % -- --   06/02/25 0100 (!) 117/58 -- -- 100 (!) 22 92 % -- --   06/02/25 0000 137/63 -- -- (!) 106 20 94 % -- --   06/01/25 2350 (!) 127/53 36.6 °C (97.8 °F) Temporal (!) 109 (!) 22 (!) 90 % -- --   06/01/25 2300 (!) 106/57 -- -- 83 20 92 % -- --   06/01/25 2210 (!) 104/57 -- -- 82 20 92 % -- --   06/01/25 2120 (!) 106/50 -- -- 89 (!) 26 92 % -- --   06/01/25 2110 (!) 108/56 -- -- 79 (!) 26 99 % -- --   06/01/25 2100 (!) 111/57 -- -- 79 (!) 21 97 % -- --   06/01/25 2040 (!) 107/51 -- -- 85 (!) 22 95 % -- --   06/01/25 2000 (!) 98/53 -- -- 91 (!) 24 97 % -- --   06/01/25  " (!) 104/55 -- -- 86 16 92 % -- --   25 194 (!) 91/52 -- -- 89 18 92 % -- --   25 1930 (!) 88/51 -- -- 97 (!) 30 (!) 82 % -- --   25 1911 (!) 96/52 -- -- -- -- 97 % -- --   25 1747 (!) 86/54 -- -- 85 (!) 29 92 % -- --   25 1707 (!) 87/52 36.3 °C (97.3 °F) Temporal 81 20 95 % -- --   25 1613 -- -- -- -- -- -- -- 99.8 kg (220 lb)   25 1602 -- -- -- -- -- 93 % -- --   25 1540 (!) 115/56 -- -- 95 18 -- -- --   25 1520 (!) 106/59 -- -- -- -- -- -- --   25 1517 -- -- -- -- (!) 22 -- -- --   25 1501 (!) 111/49 -- -- 90 18 -- -- --   25 1445 (!) 81/44 -- -- 86 18 -- -- --   25 1431 (!) 92/49 -- -- 77 20 -- -- --   25 1353 (!) 82/56 36.3 °C (97.3 °F) -- 96 (!) 28 -- 1.854 m (6' 1\") 90.7 kg (200 lb)       Temp (72hrs), Av.3 °C (97.4 °F), Min:35 °C (95 °F), Max:37.3 °C (99.1 °F)      Physical Exam:    General Appearance:    Alert, cooperative, no distress, appears stated age   Head:    Normocephalic, without obvious abnormality, atraumatic   Lungs:   Decreased breath sounds, bibasilar crackles   Chest wall:    No tenderness or deformity   Heart:    Regular rate and rhythm, S1 and S2 normal, no murmur, rub   or gallop   Abdomen:     Soft, non-tender, bowel sounds active all    Extremities:  Musculoskeletal   Extremities normal, atraumatic, no cyanosis or edema    No injury or deformity   Skin:   Skin color, texture, turgor normal, no rashes or lesions   Neurologic:     Behavior/  Emotional:   Aox3       Appropriate, cooperative       Lines, Drains, Airways, Wounds:  Peripheral IV (Adult) 25 Right Antecubital (Active)   Number of days: 2       Peripheral IV (Adult) 25 Anterior;Distal;Left;Upper Arm (Active)   Number of days: 2       Urethral Catheter Latex;Temperature probe 16 Fr (Active)   Number of days: 2       Labs:    CBC Results         25     0519 1418    WBC 27.68 25.23    RBC 2.55 3.11    HGB 8.7 10.8 "    HCT 28.0 33.2    .8 106.8    MCH 34.1 34.7    MCHC 31.1 32.5     149           Comment for HGB at 0519 on 06/03/25: ALL RESULTS HAVE BEEN CHECKED          BMP Results         06/03/25 06/02/25 06/01/25     0519 0603 1418     132 127       126    K 4.2 3.9 3.9       3.9    Cl 102 100 94       94    CO2 23 23 19       22    Glucose 227 228 273       275    BUN 26 23 33       33    Creatinine 0.8 0.9 1.1       1.3    Calcium 8.5 8.8 9.2       9.2    Anion Gap 9 9 14       10    EGFR >60.0 >60.0 >60.0       55.5           Comment for K at 0603 on 06/02/25: Results obtained on plasma. Plasma Potassium values may be up to 0.4 mEQ/L less than serum values. The differences may be greater for patients with high platelet or white cell counts.    Comment for K at 1418 on 06/01/25: Results obtained on plasma. Plasma Potassium values may be up to 0.4 mEQ/L less than serum values. The differences may be greater for patients with high platelet or white cell counts.    Comment for K at 1418 on 06/01/25: Results obtained on plasma. Plasma Potassium values may be up to 0.4 mEQ/L less than serum values. The differences may be greater for patients with high platelet or white cell counts.    Comment for EGFR at 0519 on 06/03/25: Calculation based on the Chronic Kidney Disease Epidemiology Collaboration (CKD-EPI) equation refit without adjustment for race.    Comment for EGFR at 0603 on 06/02/25: Calculation based on the Chronic Kidney Disease Epidemiology Collaboration (CKD-EPI) equation refit without adjustment for race.    Comment for EGFR at 1418 on 06/01/25: Calculation based on the Chronic Kidney Disease Epidemiology Collaboration (CKD-EPI) equation refit without adjustment for race.    Comment for EGFR at 1418 on 06/01/25: Calculation based on the Chronic Kidney Disease Epidemiology Collaboration (CKD-EPI) equation refit without adjustment for race.          PT/PTT Results         06/03/25 06/02/25 06/02/25      0519 1645 1004    PTT 69 76 78           Comment for PTT at 0519 on 06/03/25: The Standard Therapeutic Range for Heparin is 74 to 106 seconds.    Comment for PTT at 1645 on 06/02/25: The Standard Therapeutic Range for Heparin is 74 to 106 seconds.    Comment for PTT at 1004 on 06/02/25: The Standard Therapeutic Range for Heparin is 74 to 106 seconds.          UA Results         06/01/25 2026    Color Yellow    Clarity Clear    Glucose Negative    Bilirubin Negative    Ketones Negative    Sp Grav 1.035    Blood Negative    Ph 6.0    Protein Negative    Urobilinogen 0.2    Nitrite Negative    Leuk Est Negative           Comment for Blood at 2026 on 06/01/25: The sensitivity of the occult blood test is equivalent to approximately 4 intact RBC/HPF.    Comment for Leuk Est at 2026 on 06/01/25: Results can be falsely negative due to high specific gravity, some antibiotics, glucose >3 g/dl, or WBC other than neutrophils.          Lactate Results         06/01/25 06/01/25 06/01/25     2342 2050 1936    Lactate 1.9 2.2 2.4            Microbiology Results       Procedure Component Value Units Date/Time    Blood Culture Blood, Venous [[id number]]  (Abnormal) Collected: 06/01/25 1511    Specimen: Blood, Venous Updated: 06/02/25 1651     Culture **Positive Culture**     Gram Stain Result Gram positive cocci in clusters    Blood Culture Blood, Venous [[id number]]  (Abnormal) Collected: 06/01/25 1448    Specimen: Blood, Venous Updated: 06/02/25 1448     Culture **Positive Culture**     Gram Stain Result Gram positive cocci in pairs and clusters    Blood Culture PCR Panel Blood, Venous [[id number]]  (Abnormal) Collected: 06/01/25 1448    Specimen: Blood, Venous Updated: 06/02/25 1447     Candida albicans Not Detected     Candida auris Not Detected     Candida glabrata Not Detected     Candida krusei Not Detected     Candida parapsilosis Not Detected     Cryptococcus neoformans/gattii Not Detected     Enterococcus faecalis Not  Detected     Enterococcus faecium Not Detected     Enterobacterales Not Detected     Enterobacter cloacae complex Not Detected     Escherichia coli Not Detected     Klebsiella oxytoca Not Detected     Klebsiella pneumoniae Not Detected     Klebsiella aerogenes Not Detected     Proteus Not Detected     Salmonella species Not Detected     Serratia marcescens Not Detected     Haemophilus influenzae Not Detected     Listeria monocytogenes Not Detected     Neisseria meningitidis Not Detected     Pseudomonas aeruginosa Not Detected     Stenotrophomonas maltophilia Not Detected     Bacteroides fragilis Not Detected     Staphylococcus aureus Not Detected     Staphylococcus ludgnensis Not Detected     Staphylococcus epidermidis Detected     Streptococcus Not Detected     Streptococcus agalactiae (Group B) Not Detected     Streptococcus pneumoniae Not Detected     Streptococcus pyogenes (Group A) Not Detected     KPC (Carbapenem Resistance Gene) Not Applicable     mecA/C Detected     mecA/C and MREJ (MRSA) Not Applicable     Jasmin/B (Vancomycin Resistance Gene) Not Applicable     CTX-M (ESBL) Not Applicable     IMP Not Applicable     mcr-1 Not Applicable     NDM Not Applicable     OXA-48-like Not Applicable     VIM Not Applicable     Comment: --    SARS-COV-2 (COVID-19)/ FLU A/B, AND RSV, PCR Nasopharynx [[id number]]  (Normal) Collected: 06/01/25 1419    Specimen: Nasopharyngeal Swab from Nasopharynx Updated: 06/01/25 1502     SARS-CoV-2 (COVID-19) Negative     Influenza A Negative     Influenza B Negative     Respiratory Syncytial Virus Negative    Narrative:      Testing performed using real-time PCR for detection of COVID-19. EUA approved validation studies performed on site.             Pathology Results       ** No results found for the last 720 hours. **            Echo:         Imaging:    Radiology Imaging    XR CHEST 1 VW    Narrative  CLINICAL HISTORY:   Increased oxygen requirement    COMMENT:    PROCEDURE: Single  frontal view of the chest.    COMPARISON:   Prior chest x-ray and chest CT dated June 1, 2025    Support lines, tubes, devices, and surgical hardware, material: Monitoring  leads/wires overlie the patient.    Lungs and Pleura: Diffuse bilateral hazy airspace opacity and patchy  consolidation which has progressed. Possible small right pleural effusion. No  pneumothorax.    Cardiovascular and Mediastinum: The cardiomediastinal silhouette is stable.  Cardiomegaly.    Other: No acute osseous abnormality.    Impression  IMPRESSION:  Diffuse bilateral airspace opacity, progressed      Assessment   >> Acute hypoxic respiratory failure likely due to multifocal pneumonia, though multifactorial with history of CHF, COPD, pulm fibrosis  >> GPC's on blood culture, [employer] MRSE  >> History of lung cancer     Plan  - c/w ceftriaxone and azithromycin for now, No significant environmental exposures as above and no recent hospitalizations  - Unfortunately now blood cultures 2 of 2 positive for GPC's with [employer] MRSE.  Patient reportedly with no indwelling devices/foreign materials.  Patient's daughter did report patient has had multiple skin injuries over the recent history.  At this point would start vancomycin pending repeat blood cultures     -Follow-up blood cultures for final spp as well as repeat blood cultures.  If persistently positive will need echo  -Obtain respiratory culture possible     Infectious disease will continue to follow-up  Case discussed with Dr. Massiel Zuniga MD  Infectious Disease   [facility]

## 2025-06-03 NOTE — PROGRESS NOTES
"   Pulmonary Progress Note       SUBJECTIVE   Interval History:  -Feeling about the same  -O2 weaned from 15LPM to 10LPM  -Reports abdominal tenderness today  -Still labored breathing     OBJECTIVE        Vital Signs:   Visit Vitals  BP (!) 110/55   Pulse (!) 101   Temp 36.4 °C (97.5 °F) (Oral)   Resp (!) 24   Ht 1.854 m (6' 1\")   Wt 99.8 kg (220 lb)   SpO2 94%   BMI 29.03 kg/m²       I/O's:    Intake/Output Summary (Last 24 hours) at 6/3/2025 0909  Last data filed at 6/3/2025 0544  Gross per 24 hour   Intake 1394 ml   Output 650 ml   Net 744 ml       Medications:    Reviewed. Pertinent medications as below.     azithromycin  500 mg intravenous q24h INT    cefTRIAXone  1 g intravenous q24h INT    cyanocobalamin  1,000 mcg oral Daily    famotidine  20 mg oral Nightly    folic acid  1 mg oral Daily    gabapentin  300 mg oral Nightly    guaiFENesin  600 mg oral BID    insulin glargine U-100  12 Units subcutaneous Nightly    insulin lispro U-100  2-10 Units subcutaneous TID with meals    ipratropium-albuteroL  3 mL nebulization q6h RUTHY    lisinopriL  10 mg oral Daily    metoprolol tartrate  12.5 mg oral BID    mometasone-formoterol  2 puff inhalation BID (8a, 8p)    And    tiotropium bromide  2 puff inhalation Daily (8a)    pantoprazole  40 mg intravenous q24h    pravastatin  20 mg oral Daily    sodium chloride  1 g oral TID with meals    thiamine  200 mg intravenous q8h INT    Followed by    [START ON 6/5/2025] thiamine  100 mg oral Daily    vancomycin  25 mg/kg intravenous Once       Anti-infectives (From admission, onward)      Start     Dose/Rate Route Frequency Ordered Stop    06/03/25 0845  vancomycin (VANCOCIN) 2 g/500 mL IVPB in NSS         25 mg/kg × 99.8 kg  250 mL/hr over 120 Minutes intravenous Once 06/03/25 0756 06/03/25 2044    06/02/25 1515  azithromycin (ZITHROMAX) IVPB 500 mg in 250 mL NSS vial in bag         500 mg  250 mL/hr over 60 Minutes intravenous Every 24 hours interval 06/01/25 1752      " "06/02/25 1515  cefTRIAXone (ROCEPHIN) IVPB 1 g in 100 mL NSS vial in bag         1 g  200 mL/hr over 30 Minutes intravenous Every 24 hours interval 06/01/25 1752              PHYSICAL EXAMINATION        Vital Signs:   Visit Vitals  BP (!) 110/55   Pulse (!) 101   Temp 36.4 °C (97.5 °F) (Oral)   Resp (!) 24   Ht 1.854 m (6' 1\")   Wt 99.8 kg (220 lb)   SpO2 94%   BMI 29.03 kg/m²       I/O's:    Intake/Output Summary (Last 24 hours) at 6/3/2025 0909  Last data filed at 6/3/2025 0544  Gross per 24 hour   Intake 1394 ml   Output 650 ml   Net 744 ml       General: The patient is in no acute distress.  Resting comfortably in bed.  HEENT: Mucous membranes are moist.  Sclera are anicteric.  Neck: Supple.  No cervical lymphadenopathy  Cardiovascular: S1 and S2 are present.  There are no murmurs.  Lungs: Bilateral crackles. Poor air movement with bronchial breath sounds.   Abdomen: Soft, nontender and nondistended  Extremities: Trace bilateral lower extremity pitting edema  Neuro: Awake and alert.  Spontaneously moving all extremities       Diagnostic Data      Labs:    I have personally reviewed all pertinent patient laboratory results. Labs of note discussed below:    ABG Results         06/01/25 06/01/25 2050 1617    pH -- 7.43    pCO2 -- 29    pO2 -- 49    HCO3 -- 21.3    Base Excess -- -4.3    Source Of Oxygen nasal cannula 2L NC          Results from last 7 days   Lab Units 06/03/25  0519 06/02/25  0603 06/01/25  1418   CREATININE mg/dL 0.8 0.9 1.1  1.3   BUN mg/dL 26* 23 33*  33*   WBC K/uL 27.68*  --  25.23*   HEMOGLOBIN g/dL 8.7*  --  10.8*   PLATELETS K/uL 117*  --  149*       Micro:   Microbiology Results       Procedure Component Value Units Date/Time    Blood Culture Blood, Venous [[id number]]  (Abnormal) Collected: 06/01/25 1511    Specimen: Blood, Venous Updated: 06/02/25 1651     Culture **Positive Culture**     Gram Stain Result Gram positive cocci in clusters    Blood Culture Blood, Venous [[id number]]  " (Abnormal) Collected: 06/01/25 1448    Specimen: Blood, Venous Updated: 06/02/25 1448     Culture **Positive Culture**     Gram Stain Result Gram positive cocci in pairs and clusters    Blood Culture PCR Panel Blood, Venous [[id number]]  (Abnormal) Collected: 06/01/25 1448    Specimen: Blood, Venous Updated: 06/02/25 1447     Candida albicans Not Detected     Candida auris Not Detected     Candida glabrata Not Detected     Candida krusei Not Detected     Candida parapsilosis Not Detected     Cryptococcus neoformans/gattii Not Detected     Enterococcus faecalis Not Detected     Enterococcus faecium Not Detected     Enterobacterales Not Detected     Enterobacter cloacae complex Not Detected     Escherichia coli Not Detected     Klebsiella oxytoca Not Detected     Klebsiella pneumoniae Not Detected     Klebsiella aerogenes Not Detected     Proteus Not Detected     Salmonella species Not Detected     Serratia marcescens Not Detected     Haemophilus influenzae Not Detected     Listeria monocytogenes Not Detected     Neisseria meningitidis Not Detected     Pseudomonas aeruginosa Not Detected     Stenotrophomonas maltophilia Not Detected     Bacteroides fragilis Not Detected     Staphylococcus aureus Not Detected     Staphylococcus ludgnensis Not Detected     Staphylococcus epidermidis Detected     Streptococcus Not Detected     Streptococcus agalactiae (Group B) Not Detected     Streptococcus pneumoniae Not Detected     Streptococcus pyogenes (Group A) Not Detected     KPC (Carbapenem Resistance Gene) Not Applicable     mecA/C Detected     mecA/C and MREJ (MRSA) Not Applicable     Jasmin/B (Vancomycin Resistance Gene) Not Applicable     CTX-M (ESBL) Not Applicable     IMP Not Applicable     mcr-1 Not Applicable     NDM Not Applicable     OXA-48-like Not Applicable     VIM Not Applicable     Comment: --    SARS-COV-2 (COVID-19)/ FLU A/B, AND RSV, PCR Nasopharynx [[id number]]  (Normal) Collected: 06/01/25 1419    Specimen:  Nasopharyngeal Swab from Nasopharynx Updated: 06/01/25 1502     SARS-CoV-2 (COVID-19) Negative     Influenza A Negative     Influenza B Negative     Respiratory Syncytial Virus Negative    Narrative:      Testing performed using real-time PCR for detection of COVID-19. EUA approved validation studies performed on site.             Imaging:    Personally reviewed:  Radiology Images  Recent Results (from the past 6 weeks)   X-RAY CHEST 1 VIEW    Collection Time: 06/01/25  2:44 PM    Narrative    CLINICAL HISTORY: sob    COMMENT: Frontal view of the chest obtained.    COMPARISON: None available.    LINES/TUBES: None.    OTHER: Mild pulmonary edema with trace bilateral pleural effusions suggestive of  mild CHF. Superimposed patchy airspace opacities, most significant in the lower  lobes, suspicious for superimposed multifocal pneumonia. No definite  pneumothorax or large pleural effusion. Cardiomediastinal silhouette mostly  obscured and not well evaluated.      Impression    IMPRESSION: Please see comment. Recommend follow-up radiographs or chest CT in  6-8 weeks.       CT ANGIOGRAPHY CHEST/ABDOMEN/PELVIS WITH AND WITHOUT IV CONTRAST    Collection Time: 06/01/25  4:56 PM    Narrative    CLINICAL HISTORY: Shortness of breath      Impression    IMPRESSION:  1.  No evidence of pulmonary embolus or acute aortic syndrome.  2.  Extensive bilateral groundglass opacity on a background of fibrotic  interstitial lung disease which may represent acute exacerbation of chronic ILD  or a new infectious, inflammatory, hemorrhagic or edema from congestive heart  failure.  3.  Right lower lobe lung consolidation with associated mediastinal and hilar  adenopathy which may represent infection or neoplasm.  4.  Multiple liver lesions suggesting metastatic disease.  5.  Multiple other chronic findings.      COMMENT:  Technique: Computed tomography angiography of the chest, abdomen and pelvis was  performed from the thoracic inlet through  the pelvis using a dissection protocol  without and with utilizing 100 cc Isovue-370.    3D MIP and/or volume rendered image reconstruction performed and reviewed.    CT DOSE:  One or more dose reduction techniques (e.g. automated exposure  control, adjustment of the mA and/or kV according to patient size, use of  iterative reconstruction technique) utilized for this examination.    Comparisons studies: Chest x-ray dated the same day.    Lung parenchyma: Extensive bilateral patchy groundglass opacity, peripheral  reticulation, mild bronchial dilatation and minimal architectural distortion.  There is right lower lobe consolidation    Thyroid:  Normal.    Mediastinum: Mildly prominent lymph nodes the largest a subcarinal node  measuring up to 2.6 cm.    Dariana: Mildly prominent bilateral lymph nodes.    Heart and pericardium: Severe coronary calcification.  Cardiomegaly.    Pulmonary artery: Normal    Aorta: Normal    Chest wall and pleura: Normal.    Axilla: Normal.    Liver: Extensive liver lesions the largest measures up to 4.3 cm.    Spleen: Normal.    Adrenals: Normal.    Pancreas: Normal.    Kidneys, ureters and bladder: Normal.    Gallbladder: Removed    Retroperitoneal structures: Normal.    Bowel and mesentery: No inflammatory or obstructive process.  Colonic  diverticuli.    Vessels: Aortoiliac atherosclerotic disease.  No evidence of acute aortic  syndrome.    Lymph nodes: None enlarged.    Pelvic structures:  Normal.    Bones: Degenerative change.         X-RAY CHEST 1 VIEW    Collection Time: 06/02/25  2:24 AM    Narrative    CLINICAL HISTORY:   Increased oxygen requirement    COMMENT:    PROCEDURE: Single frontal view of the chest.    COMPARISON:   Prior chest x-ray and chest CT dated June 1, 2025    Support lines, tubes, devices, and surgical hardware, material: Monitoring  leads/wires overlie the patient.    Lungs and Pleura: Diffuse bilateral hazy airspace opacity and patchy  consolidation which has  progressed. Possible small right pleural effusion. No  pneumothorax.    Cardiovascular and Mediastinum: The cardiomediastinal silhouette is stable.  Cardiomegaly.    Other: No acute osseous abnormality.      Impression    IMPRESSION:  Diffuse bilateral airspace opacity, progressed                   ASSESSMENT AND PLAN      # Acute on chronic hypoxic respiratory failure  - Baseline 2 L/min, increased to 15 L/min on admission, now 10 L/min.  - Continue to wean oxygen, with goal SpO2 89-92%  - VBG without hypercapnia     # Multifocal pneumonia, community-acquired  - Given hypoxia, leukocytosis, infiltrates, recommend treating for pneumonia  - Started on ceftriaxone-azithromycin.  Will treat for 5-3 days respectively  - Sputum culture if able to expectorate  - Trend temperature, white blood cell count. WBC worsening today.      #Acute on chronic systolic and diastolic congestive heart failure  - Cautious diuresis if tolerates, BNP 1000  - Minimal fluid overload peripherally, but CT and hyponatremia are suggestive of fluid overload state  - Monitor renal function, I's and O's, blood pressure  - Consider echocardiogram     #Elevated troponin, possible NSTEMI  - Heparin infusion  - Cardiology consultation     #Combined Pulmonary Fibrosis and Emphysema (CPFE)  - Continue triple therapy inhaler, with albuterol as needed  - Target SpO2 89-92%  - If worsening, add Solu-Medrol  - h/o XRT to RLL for palliative empiric treatment of lung cancer    #Lung cancer  - Concern for metastatic disease  - Oncology following  - Planning for IR liver biopsy when stable    #Encephalopathy  - No significant agitation, would not use Precedex     30 minutes critical care time spent in the direct care of this patient, review of data, imaging, examination, formulation of a management plan, and coordination of care with the team, consultants, nursing and respiratory therapy.          Raul Ward MD  Pulmonary & Critical Care Medicine    6/3/2025  9:09 AM        Complex Repair And Split-Thickness Skin Graft Text: The defect edges were debeveled with a #15 scalpel blade.  The primary defect was closed partially with a complex linear closure.  Given the location of the defect, shape of the defect and the proximity to free margins a split thickness skin graft was deemed most appropriate to repair the remaining defect.  The graft was trimmed to fit the size of the remaining defect.  The graft was then placed in the primary defect, oriented appropriately, and sutured into place.

## 2025-06-03 NOTE — PROGRESS NOTES
SUBJECTIVE      79 year old male with history of hypertension, presumed coronary artery disease with an abnormal stress test from November 2024, heart failure with moderately reduced EF, moderate mitral regurgitation and severe pulmonary hypertension, persistent atrial fibrillation for which he has been maintained on Eliquis, COPD for which he was to be on O2, and exertional dyspnea who presented to  on 6/1/25 with progressive shortness of breath. He was hypotensive and hypoxic on arrival. WBC elevated, BNP 1100, troponin 351/ 256. CTA chest showed extensive bilateral groundglass opacities on the background of fibrotic interstitial lung disease with right lower lobe consolidation with associated mediastinal and hilar adenopathy. There were also multiple liver lesions concerning for metastatic disease.    No events overnight. Patient remains on 10L HFNC. Patient reports improved breathing since arrival. No chest pain, palpitations, dizziness, edema.    OBJECTIVE     VITAL SIGNS:  Temp:  [35 °C (95 °F)-36.4 °C (97.5 °F)] 36.4 °C (97.5 °F)  Heart Rate:  [] 85  Resp:  [18-57] 20  BP: ()/(50-66) 114/54  SPO2 99%    Intake/Output Summary (Last 24 hours) at 6/3/2025 0738  Last data filed at 6/3/2025 0400  Gross per 24 hour   Intake 1154 ml   Output 650 ml   Net 504 ml       PHYSICAL EXAM:  General appearance: alert and cooperative; on NRB  Head: without obvious abnormality  Eyes: PERRLA, extraocular movements intact  Neck: No JVD, carotid bruits, thyromegaly  Lungs: no wheezing  Heart: irregular rhythm, normal rate, S1-S2 normal, no murmurs, clicks, rubs or gallops  Abdomen: soft, non-tender, bowel sounds normal  Extremities: no edema, peripheral pulses present  Skin: Skin color, texture, turgor normal. No rashes or lesions  Neurologic: Alert and oriented X 3, no focal deficits    LABS / IMAGING / EKG / TELEMETRY     LABS:  Results from last 7 days   Lab Units 06/03/25  0519 06/02/25  0603  "06/01/25  1418   SODIUM mEQ/L 134* 132* 127*  126*   POTASSIUM mEQ/L 4.2 3.9 3.9  3.9   CHLORIDE mEQ/L 102 100 94*  94*   CO2 mEQ/L 23 23 19*  22   BUN mg/dL 26* 23 33*  33*   CREATININE mg/dL 0.8 0.9 1.1  1.3   AST IU/L  --  53* 75*   ALT IU/L  --  49 70*     Results from last 7 days   Lab Units 06/03/25  0519 06/01/25  1626 06/01/25  1418   WBC K/uL 27.68*  --  25.23*   HEMOGLOBIN g/dL 8.7*  --  10.8*   HEMATOCRIT % 28.0*  --  33.2*   PLATELETS K/uL 117*  --  149*   INR   --  1.6 1.5     No results found for: \"HGBA1C\", \"TSH\"  No results found for: \"CHOL\", \"LDLCALC\", \"HDL\", \"TRIG\"  Lab Results   Component Value Date    BNP 1,103 (H) 06/01/2025       IMAGING:  CXR 6/2/25: Diffuse bilateral airspace opacity, progressed     TELEMETRY: afib, rate controlled, no events      MEDICATIONS         azithromycin  500 mg intravenous q24h INT    cefTRIAXone  1 g intravenous q24h INT    cyanocobalamin  1,000 mcg oral Daily    famotidine  20 mg oral Nightly    folic acid  1 mg oral Daily    gabapentin  300 mg oral Nightly    guaiFENesin  600 mg oral BID    insulin glargine U-100  12 Units subcutaneous Nightly    insulin lispro U-100  2-10 Units subcutaneous TID with meals    ipratropium-albuteroL  3 mL nebulization q6h RUTHY    lisinopriL  10 mg oral Daily    metoprolol tartrate  12.5 mg oral BID    mometasone-formoterol  2 puff inhalation BID (8a, 8p)    And    tiotropium bromide  2 puff inhalation Daily (8a)    pantoprazole  40 mg intravenous q24h    pravastatin  20 mg oral Daily    sodium chloride  1 g oral TID with meals    thiamine  200 mg intravenous q8h INT    Followed by    [START ON 6/5/2025] thiamine  100 mg oral Daily       ASSESSMENT AND PLAN     Unfortunate 79 year-old  man who presented to  with dyspnea/hypoxemia, hypotension and hyponatremia/lactic acidosis.  He was found to have evidence of metastatic disease of his liver with recent 30 lb weight loss.       Dyspnea/hypoxemia - this is " multifactorial and related to his underlying lung disease (evidence of ILD by CT scan), pneumonia and possible metastatic disease. While he has underlying HFmEF at baseline, do not believe that his dyspnea is primarily cardiac.  Given his hypotension, low Na urine, and improvement in his serum sodium with saline/albumen would hold diuretics for now.  He is NOT on standing dose LOOP diuretic as outpatient. Pulmonary following.  Elevated troponin - he has evidence of inferior infarct with mild ada-infarct ischemia by stress testing from 11/2024. Severe coronary calcification on CTA. Suspect his underlying illness precipitated some degree of demand ischemia.  Troponins are trending downward. No chest pain. Hold beta blockade given his hypotension. TTE ordered.  Permanent afib - patient reports discontinuing eliquis 10 years ago after his brother passed away from an aneurysm. He was on it briefly last year and then stopped. He understands the risk of CVA, death, disability without anticoagulation in the setting of permanent afib. He is on IV heparin for now. Monitor hgb and platelets. Continue metoprolol for rate control.  HFmEF - by TTE with moderate MR and moderate TR with severe pulmonary hypertension - presumably related to his underlying lung disease. GDMT on hold given hypotension. Will check TTE.  Metastatic disease - heme/onc to see patient.      TOMER Sharma  6/3/2025    Primary Care Doctor: Mendez Montaño

## 2025-06-03 NOTE — NURSING NOTE
Patient declined ordered Respiratory Therapy (see declined checkmark order(s) below), despite being informed of clinical indication for order(s), being educated on the order(s) details and being offered support and assistance to facilitate the order(s).    Non-invasive ventilation (Bipap/CPAP) [x]   Inhaled Med []   Heated High Flow Oxygen therapy []   Chest physiotherapy (Vest) []   PEP therapy (Aerobika) []   Arterial Blood Gas []     Bedside nurse aware.    Please call, page or secure chat RT if patient condition changes and/or if patient is no longer declining order for Respiratory Therapy.

## 2025-06-03 NOTE — PROGRESS NOTES
Hospital Medicine     Daily Progress Note                SUBJECTIVE   Interval History: He has some improvement  O2 weaned to 10 LPM, but now having respiratory distress, bipap required   Daughter at bedside and updated plan.      OBJECTIVE      Vital signs in last 24 hours:  Temp:  [36.4 °C (97.5 °F)-36.5 °C (97.7 °F)] 36.5 °C (97.7 °F)  Heart Rate:  [] 100  Resp:  [18-26] 24  BP: (100-121)/(51-59) 121/55  FiO2 (%) (Set):  [40 %-70 %] 70 %    Intake/Output Summary (Last 24 hours) at 6/3/2025 1422  Last data filed at 6/3/2025 0544  Gross per 24 hour   Intake 1394 ml   Output 650 ml   Net 744 ml         PHYSICAL EXAMINATION      Physical Exam  Vitals and nursing note reviewed.   Constitutional:       Appearance: Normal appearance.   HENT:      Head: Normocephalic and atraumatic.   Cardiovascular:      Rate and Rhythm: Normal rate and regular rhythm.      Pulses: Normal pulses.      Heart sounds: Normal heart sounds. No murmur heard.     No friction rub. No gallop.   Pulmonary:      Effort: Pulmonary effort is normal. No respiratory distress.      Breath sounds: Normal breath sounds. No stridor. No wheezing or rhonchi.   Abdominal:      General: Abdomen is flat. Bowel sounds are normal. There is no distension.      Palpations: Abdomen is soft. There is no mass.      Tenderness: There is no abdominal tenderness.      Hernia: No hernia is present.   Musculoskeletal:         General: No swelling, tenderness, deformity or signs of injury. Normal range of motion.   Skin:     General: Skin is warm and dry.      Coloration: Skin is not jaundiced or pale.      Findings: No bruising or erythema.   Neurological:      General: No focal deficit present.      Mental Status: He is alert and oriented to person, place, and time.      Cranial Nerves: No cranial nerve deficit.      Sensory: No sensory deficit.      Motor: No weakness.      Coordination: Coordination normal.   Psychiatric:         Mood and Affect: Mood  normal.         Behavior: Behavior normal.         Thought Content: Thought content normal.         Judgment: Judgment normal.            LINES, CATHETERS, DRAINS, AIRWAYS, AND WOUNDS   Lines, Drains, and Airways:  Wounds (agree with documentation and present on admission):  Peripheral IV (Adult) 06/01/25 Right Antecubital (Active)   Number of days: 2       Peripheral IV (Adult) 06/01/25 Anterior;Distal;Left;Upper Arm (Active)   Number of days: 2       Urethral Catheter Latex;Temperature probe 16 Fr (Active)   Number of days: 2         Comments:    LABS / IMAGING / TELE      Labs     LABS   CMP Results         06/03/25 06/02/25 06/01/25     0519 0603 1418     132 127       126    K 4.2 3.9 3.9       3.9    Cl 102 100 94       94    CO2 23 23 19       22    Glucose 227 228 273       275    BUN 26 23 33       33    Creatinine 0.8 0.9 1.1       1.3    Calcium 8.5 8.8 9.2       9.2    Anion Gap 9 9 14       10    AST -- 53 75    ALT -- 49 70    Albumin -- 3.8 3.8    EGFR >60.0 >60.0 >60.0       55.5           Comment for K at 0603 on 06/02/25: Results obtained on plasma. Plasma Potassium values may be up to 0.4 mEQ/L less than serum values. The differences may be greater for patients with high platelet or white cell counts.    Comment for K at 1418 on 06/01/25: Results obtained on plasma. Plasma Potassium values may be up to 0.4 mEQ/L less than serum values. The differences may be greater for patients with high platelet or white cell counts.    Comment for K at 1418 on 06/01/25: Results obtained on plasma. Plasma Potassium values may be up to 0.4 mEQ/L less than serum values. The differences may be greater for patients with high platelet or white cell counts.    Comment for EGFR at 0519 on 06/03/25: Calculation based on the Chronic Kidney Disease Epidemiology Collaboration (CKD-EPI) equation refit without adjustment for race.    Comment for EGFR at 0603 on 06/02/25: Calculation based on the Chronic Kidney Disease  Epidemiology Collaboration (CKD-EPI) equation refit without adjustment for race.    Comment for EGFR at 1418 on 06/01/25: Calculation based on the Chronic Kidney Disease Epidemiology Collaboration (CKD-EPI) equation refit without adjustment for race.    Comment for EGFR at 1418 on 06/01/25: Calculation based on the Chronic Kidney Disease Epidemiology Collaboration (CKD-EPI) equation refit without adjustment for race.          CBC Results         06/03/25 06/01/25     0519 1418    WBC 27.68 25.23    RBC 2.55 3.11    HGB 8.7 10.8    HCT 28.0 33.2    .8 106.8    MCH 34.1 34.7    MCHC 31.1 32.5     149           Comment for HGB at 0519 on 06/03/25: ALL RESULTS HAVE BEEN CHECKED          Troponin I Results         06/01/25 06/01/25 06/01/25     1936 1626 1418    HS Troponin I 308.3 256.3 351.3          PT/PTT Results         06/03/25 06/03/25 06/02/25     1236 0519 1645     69 76           Comment for PTT at 1236 on 06/03/25: The Standard Therapeutic Range for Heparin is 74 to 106 seconds.    Comment for PTT at 0519 on 06/03/25: The Standard Therapeutic Range for Heparin is 74 to 106 seconds.    Comment for PTT at 1645 on 06/02/25: The Standard Therapeutic Range for Heparin is 74 to 106 seconds.          Lab Results   Component Value Date    BNP 1,103 (H) 06/01/2025         Imaging  X-RAY CHEST 1 VIEW  Result Date: 6/2/2025  IMPRESSION: Diffuse bilateral airspace opacity, progressed     CT ANGIOGRAPHY CHEST/ABDOMEN/PELVIS WITH AND WITHOUT IV CONTRAST  Result Date: 6/1/2025  IMPRESSION: 1.  No evidence of pulmonary embolus or acute aortic syndrome. 2.  Extensive bilateral groundglass opacity on a background of fibrotic interstitial lung disease which may represent acute exacerbation of chronic ILD or a new infectious, inflammatory, hemorrhagic or edema from congestive heart failure. 3.  Right lower lobe lung consolidation with associated mediastinal and hilar adenopathy which may represent infection  or neoplasm. 4.  Multiple liver lesions suggesting metastatic disease. 5.  Multiple other chronic findings. COMMENT: Technique: Computed tomography angiography of the chest, abdomen and pelvis was performed from the thoracic inlet through the pelvis using a dissection protocol without and with utilizing 100 cc Isovue-370. 3D MIP and/or volume rendered image reconstruction performed and reviewed. CT DOSE:  One or more dose reduction techniques (e.g. automated exposure control, adjustment of the mA and/or kV according to patient size, use of iterative reconstruction technique) utilized for this examination. Comparisons studies: Chest x-ray dated the same day. Lung parenchyma: Extensive bilateral patchy groundglass opacity, peripheral reticulation, mild bronchial dilatation and minimal architectural distortion. There is right lower lobe consolidation Thyroid:  Normal. Mediastinum: Mildly prominent lymph nodes the largest a subcarinal node measuring up to 2.6 cm. Dariana: Mildly prominent bilateral lymph nodes. Heart and pericardium: Severe coronary calcification.  Cardiomegaly. Pulmonary artery: Normal Aorta: Normal Chest wall and pleura: Normal. Axilla: Normal. Liver: Extensive liver lesions the largest measures up to 4.3 cm. Spleen: Normal. Adrenals: Normal. Pancreas: Normal. Kidneys, ureters and bladder: Normal. Gallbladder: Removed Retroperitoneal structures: Normal. Bowel and mesentery: No inflammatory or obstructive process.  Colonic diverticuli. Vessels: Aortoiliac atherosclerotic disease.  No evidence of acute aortic syndrome. Lymph nodes: None enlarged. Pelvic structures:  Normal. Bones: Degenerative change.     X-RAY CHEST 1 VIEW  Result Date: 6/1/2025  IMPRESSION: Please see comment. Recommend follow-up radiographs or chest CT in 6-8 weeks.     Cardiac Imaging    TRANSTHORACIC ECHO (TTE) COMPLETE 06/03/2025    Interpretation Summary    Left Ventricle: Normal ventricle size. Normal wall thickness. Preserved  systolic function. Estimated EF 55%. There is a ventricular septal bounce that may represent  RV pressure overload Indeterminate diastolic filling pattern.    Right Ventricle: Moderately dilated ventricle size. Moderately reduced systolic function. Global hypokinesis.    Left Atrium: Mildly dilated atrium.    Right Atrium: Atrium not well visualized.    Aortic Valve: Valve not well seen but likely trileaflet.  Moderate diffuse calcification present with reduced excursion. No regurgitation. Mild stenosis. Peak velocity = 1.84 m/s. Mean gradient = 7.00 mmHg. Calculated area by cont eq = 1.81 cm2. Calculated dimensionless index = 0.47.    Mitral Valve: Mild bileaflet thickening. No regurgitation.    Tricuspid Valve: Structure is grossly normal. Mild regurgitation.  PA pressure estimated at 48 to 50 mmHg.    Pulmonic Valve: Valve not well visualized. Grossly normal structure.    Aorta: Aortic root normal. Sinuses of Valsalva normal-sized. Ascending aorta normal-sized.    Pericardium: No evidence of pericardial effusion.    There is no previous echocardiogram available for comparison.    Lab Results   Component Value Date    VENTRICRATE 80 06/01/2025    QRSDURATION 94 06/01/2025    QTINT 406 06/01/2025    QTCCALCULAT 468 06/01/2025    RAXIS 2 06/01/2025    TWAVEAXIS -41 06/01/2025        ASSESSMENT AND PLAN      # Acute on chronic hypoxic respiratory failure  - Baseline 2 L/min, increased to 15 L/min on admission, now 10 L/min.  - Continue to wean oxygen, with goal SpO2 89-92%  - VBG without hypercapnia  - RR range: 18-26 (since 6/2/25 16:00), SaO2: 87 % (6/3/25 12:00)  - VBG: pH: 7.35 / pCO2: 43 / pO2: 20 / HCO3: 22 (6/1/25 20:50)  - currently on: albuterol-ipratropium 2.5 mg-0.5 mg nebulizer QID  - currently on: formoterol-mometasone 5 mcg-0.2 mg 2 puff inhalation BID  - currently on: tiotropium 2.5 mcg 2 puff inhalation daily     # Multifocal pneumonia, community-acquired  - Given hypoxia, leukocytosis, infiltrates,  recommend treating for pneumonia  - Started on ceftriaxone-azithromycin. Will treat for 5-3 days respectively  - Sputum culture if able to expectorate  - Trend temperature, white blood cell count. WBC worsening today.  - RR: 24 (6/3/25 12:00)  - respiratory pathogens: Not Detected (6/1/25)  - temperature: 36.5 ºC (6/3/25 8:00) up from last abnormal 35 ºC (6/2/25 8:00), high 37.3 ºC (6/2/25 2:00)  - white blood cell count: 27.7 (6/3/25) up from 25.2 (6/1/25)  - currently on: azithromycin 500 mg iv q24h (06/01/25 - current)  - currently on: ceftriaxone 1 g iv q24h (06/01/25 - current)     # Acute on chronic systolic and diastolic congestive heart failure  - Cautious diuresis if tolerates, BNP 1000  - Minimal fluid overload peripherally, but CT and hyponatremia are suggestive of fluid overload state  - Monitor renal function, I's and O's, blood pressure  - Consider echocardiogram  - BNP: 1103 (6/1/25)  - LV ejection fraction: 55 % (6/3/25)  - continue home: lisinopril 10 mg po daily  - continue home: metoprolol tartrate 12.5 mg po BID     # Elevated troponin, possible NSTEMI  - Heparin infusion  - Cardiology consultation  - troponin I high sensitivity: 308 (6/1/25 19:36) up from 256 (6/1/25 16:26), high 351 (6/1/25 14:18)  - completed: aspirin 324 mg po  - continue home: lisinopril 10 mg po daily  - continue home: metoprolol tartrate 12.5 mg po BID  - currently on: heparin 100 - 4000 units/hr iv Titrated continuously  - currently on: pravastatin tablet 20 mg po daily     # Combined Pulmonary Fibrosis and Emphysema (CPFE)  - Continue triple therapy inhaler, with albuterol as needed  - Target SpO2 89-92%  - If worsening, add Solu-Medrol  - h/o XRT to RLL for palliative empiric treatment of lung cancer  - ABG: pH: 7.43 / pCO2: 29 / pO2: 49 (6/1/25 16:17)  - currently on: albuterol-ipratropium 2.5 mg-0.5 mg nebulizer QID  - currently on: ceftriaxone 1 g iv q24h (06/01/25 - current)  - currently on: formoterol-mometasone 5  mcg-0.2 mg 2 puff inhalation BID  - holding home: TRELEGY ELLIPTA 200 MCG-62.5 MCG-25 MCG POWDER FOR INHALATION 1 puff     # Lung cancer  - Concern for metastatic disease  - Oncology following  - Planning for IR liver biopsy when stable     # Encephalopathy  - No significant agitation, would not use Precedex     30 minutes critical care time spent in the direct care of this patient, review of data, imaging, examination, formulation of a management plan, and coordination of care with the team, consultants, nursing and respiratory therapy.  - ABG: pH: 7.43 / pCO2: 29 / pO2: 49 (6/1/25 16:17)  - sodium: 134, BUN: 26, calcium: 8.5, glucose: 240 (6/3/25)  - continue home: gabapentin 300 mg po daily  - discontinued: lorazepam 1 mg iv          VTE Assessment: Padua    VTE Prophylaxis:  Current anticoagulants:  heparin (porcine) bolus from bag 3,500-7,050 Units, intravenous, q6h PRN  heparin infusion in D5W 100 units/mL, intravenous, Titrated      Code Status: DNR (A.N.D.)      Estimated Discharge Date: 6/4/2025   Disposition Planning: progression           Sincere Rankin MD  6/3/2025

## 2025-06-03 NOTE — CONSULTS
Visited Pt/ Juaquin with daughter/ Abigail and son/ Shaun bedside. Juaquin welcomed visit and engaged in conversation focused on family, friends, and interests.  No spiritual distress observed. Thanks expressed for visit. The Spiritual Care team will remain available for spiritual and emotional needs.     George Bermudez   Spiritual Care Volunteer  [phone]  #[phone]

## 2025-06-03 NOTE — PROGRESS NOTES
"Vancomycin Dosing by Pharmacy Consult Initiated    Juaquin Linda is a 79 y.o. male who has been consulted for vancomycin dosing for bloodstream infection prescribed by Dr.Adrian Zuniga.    Reviewed relevant clinical data including weight, renal function, previous vancomycin doses, and vancomycin levels:  Creatinine   Date/Time Value Ref Range Status   06/03/2025 0519 0.8 0.7 - 1.3 mg/dL Final   06/02/2025 0603 0.9 0.7 - 1.3 mg/dL Final   06/01/2025 1418 1.3 0.7 - 1.3 mg/dL Final   06/01/2025 1418 1.1 0.7 - 1.3 mg/dL Final     No results found for: \"VANCORANDOM\", \"VANCOTROUGH\", \"VANCOPEAK\"      Vancomycin Administrations (last 96 hours)       Date/Time Action Medication Dose Rate    06/03/25 0912 New Bag    vancomycin (VANCOCIN) 2 g/500 mL IVPB in NSS 2,000 mg 250 mL/hr            Assessment/Plan  The patient is ordered vancomycin dosing by pharmacy.    The dose will be based on:         Wt Readings from Last 1 Encounters:   06/01/25 99.8 kg (220 lb)         Estimated Creatinine Clearance: 91.6 mL/min by (C-G formula)      Will initiate a loading dose   2000 mg IV x1 and maintenance dose of 1250 mg IV every 12 hours.    Target a trough of 15-20 ug/mL per vancomycin dosing per pharmacy order.  Pharmacy will continue to follow the patient's vancomycin dosing daily.      Please call vancomycin levels to the pharmacy.  Rae Herrera, PharmD    "

## 2025-06-03 NOTE — PLAN OF CARE
Care Coordination Admission Assessment Note    General Information:  Readmission Within the last 30 days: no previous admission in last 30 days  Does patient have a : No  Patient-Specific Goals (include timeframe): spoke to son Efrain & marysol in law [place]    Living Arrangements:  Arrived From: home  Current Living Arrangements: home  People in Home: roommate(s)  Home Accessibility:    Living Arrangement Comments: lives in [place] in single family home    Housing Stability and Utility Access (SDOH):  In the last 12 months, was there a time when you were not able to pay the mortgage or rent on time?: No  In the past 12 months, how many times have you moved?:    At any time in the past 12 months, were you homeless or living in a shelter (including now)?: No  In the past 12 months has the electric, gas, oil, or water company threatened to shut off services in your home?: No    Functional Status Prior to Admission:   Assistive Device/Animal Currently Used at Home: oxygen (does not know of provider)  Functional Status Comments: independent  IADL Comments: independent     Supports and Services:  Current Outpatient/Agency/Support Group: other (see comments) ([facility] [place]  Type of Current Home Care Services:    History of home care episode or rehab stay: denies    Discharge Needs Assessment:   Concerns to be Addressed: adjustment to diagnosis/illness, care coordination/care conferences, discharge planning, decision-making  Current Discharge Risk: physical impairment, chronically ill  Anticipated Changes Related to Illness: inability to care for self    Patient/Family Anticipated Discharge Plan:  Patient/Family Anticipates Transition To: home, home with family, home with help/services, skilled nursing facility  Patient/Family Anticipated Services at Transition: home health care, hospice care, skilled nursing    Connection to Community       Patient Choice:   Offered/Gave Vendor List:          Anticipated Discharge Plan:  Met with patient. Provided education and contact information for Care Coordination services.: yes  Anticipated Discharge Disposition: home with home health, inpatient hospice, skilled nursing facility  Type of Home Care Services: home OT, home PT, nursing, home hospice    Type of Skilled Nursing Care Services: OT, PT, nursing    Transportation Needs (SDOH):  Transportation Concerns: none  Transportation Anticipated: family or friend will provide, health plan transportation  Is Out of Hospital DNR needed at discharge?:      In the past 12 months, has lack of transportation kept you from medical appointments or from getting medications?: No  In the past 12 months, has lack of transportation kept you from meetings, work, or from getting things needed for daily living?: No    Concerns - comments: chart reviewed & sw met with patient, daughter in law & son Efrain at bedside. sw went over role of care coordination. patient lives w/a friend in a home in [place] & came to [place] since not feeling well. patient had a portable o2 device on but family unaware of medical status & needs. patient is uncertain of o2 provider. patient has hx of lung cancer & is for liver bx when able. per family they asked about next steps & sw discussed await to see to see plan of caer. patient was staying w/son Santi & the address is : [address] [place]. sw called & left ms for [facility] ext [phone] to see about coverage & serivces. [facility]. pallative care was consulted & family would like to be involved with updates . sw disucssed my chart thru [facility] & to sign patient up for this. case management to follow.

## 2025-06-04 NOTE — PROGRESS NOTES
SUBJECTIVE      79 year old male with history of hypertension, presumed coronary artery disease with an abnormal stress test from November 2024, heart failure with moderately reduced EF, moderate mitral regurgitation and severe pulmonary hypertension, persistent atrial fibrillation for which he has been maintained on Eliquis, COPD for which he was to be on O2, and exertional dyspnea who presented to  on 6/1/25 with progressive shortness of breath. He was hypotensive and hypoxic on arrival. WBC elevated, BNP 1100, troponin 351/ 256. CTA chest showed extensive bilateral groundglass opacities on the background of fibrotic interstitial lung disease with right lower lobe consolidation with associated mediastinal and hilar adenopathy. There were also multiple liver lesions concerning for metastatic disease.    No events overnight. Patient with worsening dyspnea, now on 60L HFNC. Patient reports feeling better. No chest pain, palpitations, dizziness, edema.    OBJECTIVE     VITAL SIGNS:  Temp:  [36.3 °C (97.3 °F)-36.9 °C (98.5 °F)] 36.7 °C (98 °F)  Heart Rate:  [] 82  Resp:  [] 28  BP: ()/(47-59) 119/59  FiO2 (%) (Set):  [40 %-70 %] 60 %  SPO2 93%    Intake/Output Summary (Last 24 hours) at 6/4/2025 0904  Last data filed at 6/4/2025 0700  Gross per 24 hour   Intake 2185.42 ml   Output 1130 ml   Net 1055.42 ml       PHYSICAL EXAM:  General appearance: alert and cooperative; on NRB  Head: without obvious abnormality  Eyes: PERRLA, extraocular movements intact  Neck: No JVD, carotid bruits, thyromegaly  Lungs: no wheezing  Heart: irregular rhythm, normal rate, S1-S2 normal, no murmurs, clicks, rubs or gallops  Abdomen: soft, non-tender, bowel sounds normal  Extremities: no edema, peripheral pulses present  Skin: Skin color, texture, turgor normal. No rashes or lesions  Neurologic: Alert and oriented X 3, no focal deficits    LABS / IMAGING / EKG / TELEMETRY     LABS:  Results from last 7 days   Lab  "Units 06/04/25  0514 06/03/25  0519 06/02/25  0603 06/01/25  1418   SODIUM mEQ/L 134* 134* 132* 127*  126*   POTASSIUM mEQ/L 4.0 4.2 3.9 3.9  3.9   MAGNESIUM mg/dL 2.1  --   --   --    CHLORIDE mEQ/L 102 102 100 94*  94*   CO2 mEQ/L 25 23 23 19*  22   BUN mg/dL 29* 26* 23 33*  33*   CREATININE mg/dL 1.1 0.8 0.9 1.1  1.3   AST IU/L  --   --  53* 75*   ALT IU/L  --   --  49 70*     Results from last 7 days   Lab Units 06/04/25  0514 06/03/25  0519 06/01/25  1626 06/01/25  1418   WBC K/uL 21.62* 27.68*  --  25.23*   HEMOGLOBIN g/dL 8.3* 8.7*  --  10.8*   HEMATOCRIT % 26.2* 28.0*  --  33.2*   PLATELETS K/uL 99* 117*  --  149*   INR   --   --  1.6 1.5     No results found for: \"HGBA1C\", \"TSH\"  No results found for: \"CHOL\", \"LDLCALC\", \"HDL\", \"TRIG\"  Lab Results   Component Value Date    BNP 1,103 (H) 06/01/2025       IMAGING:  CXR 6/2/25: Diffuse bilateral airspace opacity, progressed     TELEMETRY: afib, rate controlled, no events      MEDICATIONS         azithromycin  500 mg intravenous q24h INT    cefTRIAXone  1 g intravenous q24h INT    cyanocobalamin  1,000 mcg oral Daily    famotidine  20 mg oral Nightly    folic acid  1 mg oral Daily    gabapentin  300 mg oral Nightly    guaiFENesin  600 mg oral BID    insulin glargine U-100  12 Units subcutaneous Nightly    insulin lispro U-100  2-10 Units subcutaneous TID with meals    ipratropium-albuteroL  3 mL nebulization q6h RUTHY    lisinopriL  10 mg oral Daily    metoprolol tartrate  12.5 mg oral BID    mometasone-formoterol  2 puff inhalation BID (8a, 8p)    And    tiotropium bromide  2 puff inhalation Daily (8a)    pantoprazole  40 mg intravenous q24h    pravastatin  20 mg oral Daily    sodium chloride  1 g oral TID with meals    thiamine  200 mg intravenous q8h INT    Followed by    [START ON 6/5/2025] thiamine  100 mg oral Daily    vancomycin  1,250 mg intravenous q12h INT       ASSESSMENT AND PLAN     Unfortunate 79 year-old  man who presented to  with " dyspnea/hypoxemia, hypotension and hyponatremia/lactic acidosis.  He was found to have evidence of metastatic disease of his liver with recent 30 lb weight loss.       Dyspnea/hypoxemia/sepsis - this is multifactorial and related to his underlying lung disease (evidence of ILD by CT scan), pneumonia and possible metastatic disease. EF preserved. I do not believe that his dyspnea is primarily cardiac.  Given his hypotension, low Na urine, and improvement in his serum sodium with saline/albumen would hold diuretics for now.  He is NOT on standing dose LOOP diuretic as outpatient. Pulmonary following.  Elevated troponin - he has evidence of inferior infarct with mild ada-infarct ischemia by stress testing from 11/2024. Severe coronary calcification on CTA. Suspect his underlying illness precipitated some degree of demand ischemia.  Troponins are trending downward. No chest pain. Preserved LV function and underlying bacteremia/pneumonia/metastatic disease ppt demand ischemia. Hold beta blockade given his hypotension.  Permanent afib - patient reports discontinuing eliquis 10 years ago after his brother passed away from an aneurysm. He was on it briefly last year and then stopped. He understands the risk of CVA, death, disability without anticoagulation in the setting of permanent afib. He is on IV heparin for now. Monitor hgb and platelets. Continue metoprolol for rate control.  HFpEF - TTE shows preserved EF with moderate pulmonary hypertension and evidence for RV dilatation/dysfunction - presumably related to his underlying lung disease. GDMT on hold given hypotension.   Metastatic disease - heme/onc following.     TOMER Sharma  6/4/2025    Primary Care Doctor: Mendez Montaño

## 2025-06-04 NOTE — PROGRESS NOTES
Infectious Disease Progress Note    Patient Name: Juaquin Linda  MR#: [id number]  : 1945  Admission Date: 2025  Date: 25   Time: 7:54 AM   Author: Jovanni Zuniga MD    Major Events:   No acute events overnight  Patient remains afebrile, HD stable, high flow nasal cannula 60 L/min, 60% FiO2  Leukocytosis improved to 21K, thrombocytopenia to 99    Microbiology:   RVP negative   blood cultures 2 of 2 positive for coagulase-negative staph MRSE   blood cultures no growth     Radiology:   chest x-ray pulmonary edema, bilateral pleural effusions, patchy airspace opacities concerning for multifocal pneumonia.   CT angio of the chest, abdomen, pelvis.  No PE.  Extensive bilateral groundglass opacities, right lower lung consolidation with mediastinal/hilar lymphadenopathy.  Hepatic lesions concerning for metastatic disease     Antimicrobial history:  Ceftriaxone -P  Azithromycin -p  Vancomycin 6/3-p    Antibiotics:    Anti-infectives (From admission, onward)      Start     Dose/Rate Route Frequency Ordered Stop    25 2100  vancomycin 1.25 gram/250 mL IVPB in NSS         1,250 mg  166.7 mL/hr over 90 Minutes intravenous Every 12 hours interval 25 1004      25 0737  vancomycin Therapy by Pharmacy Protocol 1 each         1 each Other evaluate daily 25 0737      25 1515  azithromycin (ZITHROMAX) IVPB 500 mg in 250 mL NSS vial in bag         500 mg  250 mL/hr over 60 Minutes intravenous Every 24 hours interval 25 1752      25 1515  cefTRIAXone (ROCEPHIN) IVPB 1 g in 100 mL NSS vial in bag         1 g  200 mL/hr over 30 Minutes intravenous Every 24 hours interval 25 1752              Subjective   Pt reports more SOB today.  Also nasal congestion.  No fevers or chills.   Primary RN at bedside     Review of Systems    All other systems reviewed and negative except as noted in the HPI.    Objective     Vital Signs:    Patient Vitals for the past 72  "hrs:   BP Temp Temp src Pulse Resp SpO2 Height Weight   06/04/25 0700 (!) 107/52 -- -- 85 -- 94 % -- --   06/04/25 0600 (!) 105/53 -- -- 79 -- 95 % -- --   06/04/25 0500 (!) 104/58 -- -- 74 -- 93 % -- --   06/04/25 0400 (!) 101/57 -- -- 77 -- 94 % -- --   06/04/25 0326 -- -- -- 78 -- -- -- --   06/04/25 0300 (!) 92/56 36.3 °C (97.4 °F) Axillary 81 -- 97 % -- --   06/04/25 0243 -- -- -- -- -- 98 % -- --   06/04/25 0200 (!) 85/47 -- -- 80 -- 98 % -- --   06/04/25 0100 (!) 94/52 -- -- 73 -- 98 % -- --   06/04/25 0000 (!) 103/55 -- -- 80 -- 100 % -- --   06/03/25 2315 (!) 92/51 -- -- 80 20 97 % -- --   06/03/25 2300 -- 36.9 °C (98.5 °F) Axillary 80 -- 98 % -- --   06/03/25 2245 -- -- -- -- -- 100 % -- --   06/03/25 2230 -- -- -- -- -- 95 % -- --   06/03/25 2215 (!) 94/49 -- -- 84 -- 99 % -- --   06/03/25 2210 (!) 88/53 -- -- 86 -- 100 % -- --   06/03/25 2200 -- -- -- 88 -- 100 % -- --   06/03/25 2145 -- -- -- -- (!) 100 -- -- --   06/03/25 2115 (!) 87/55 -- -- 93 -- 100 % -- --   06/03/25 2100 -- -- -- 97 -- 100 % -- --   06/03/25 2030 (!) 87/53 -- -- 93 -- 99 % -- --   06/03/25 2009 -- -- -- -- -- 99 % -- --   06/03/25 2000 (!) 84/50 -- -- (!) 109 -- 100 % -- --   06/03/25 1945 -- -- -- -- -- (!) 91 % -- --   06/03/25 1942 (!) 111/58 -- -- (!) 114 -- 94 % -- --   06/03/25 1930 -- -- -- -- -- 97 % -- --   06/03/25 1908 -- -- -- (!) 129 -- -- -- --   06/03/25 1900 -- 36.3 °C (97.3 °F) Axillary (!) 107 -- 100 % -- --   06/03/25 1800 (!) 112/55 -- -- (!) 107 (!) 24 (!) 90 % -- --   06/03/25 1630 -- -- -- (!) 101 -- (!) 88 % -- --   06/03/25 1627 -- -- -- (!) 108 -- -- -- --   06/03/25 1600 (!) 101/54 36.8 °C (98.2 °F) Oral 98 (!) 26 92 % -- --   06/03/25 1456 -- -- -- -- (!) 28 95 % -- --   06/03/25 1400 (!) 121/55 -- -- 100 (!) 30 100 % -- --   06/03/25 1200 (!) 105/54 -- -- (!) 101 (!) 24 (!) 87 % -- --   06/03/25 1021 -- -- -- -- -- -- 1.854 m (6' 1\") 99.8 kg (220 lb)   06/03/25 1000 (!) 100/56 -- -- 99 (!) 24 93 % -- " --   06/03/25 0854 (!) 110/55 -- -- (!) 101 -- -- -- --   06/03/25 0800 (!) 110/55 36.5 °C (97.7 °F) Oral 91 (!) 24 94 % -- --   06/03/25 0759 -- -- -- 92 -- -- -- --   06/03/25 0600 (!) 103/56 -- -- 93 20 97 % -- --   06/03/25 0400 (!) 108/54 36.4 °C (97.5 °F) Oral 91 20 97 % -- --   06/03/25 0337 -- -- -- -- 20 99 % -- --   06/03/25 0200 (!) 114/54 -- -- 85 18 96 % -- --   06/03/25 0000 (!) 104/53 36.4 °C (97.5 °F) Oral 86 18 93 % -- --   06/02/25 2343 -- -- -- 77 -- -- -- --   06/02/25 2340 -- -- -- -- 18 93 % -- --   06/02/25 2200 (!) 101/58 -- -- 94 18 94 % -- --   06/02/25 2100 (!) 106/59 36.4 °C (97.5 °F) Oral 95 20 98 % -- --   06/02/25 2011 -- -- -- -- 20 96 % -- --   06/02/25 1959 -- -- -- (!) 104 -- -- -- --   06/02/25 1900 (!) 117/58 -- -- (!) 105 20 92 % -- --   06/02/25 1800 (!) 110/54 -- -- 100 (!) 26 96 % -- --   06/02/25 1600 (!) 105/51 -- -- 97 (!) 26 (!) 90 % -- --   06/02/25 1300 (!) 95/50 -- -- (!) 103 (!) 26 94 % -- --   06/02/25 1205 (!) 102/53 -- -- (!) 105 (!) 27 95 % -- --   06/02/25 1200 (!) 102/53 36.3 °C (97.3 °F) Temporal (!) 105 (!) 57 93 % -- --   06/02/25 1153 -- -- -- -- (!) 26 95 % -- --   06/02/25 1035 (!) 90/54 -- -- 93 (!) 28 97 % -- --   06/02/25 1000 (!) 96/52 -- -- 98 (!) 52 97 % -- --   06/02/25 0900 (!) 103/56 -- -- (!) 105 (!) 26 99 % -- --   06/02/25 0800 111/66 (!) 35 °C (95 °F) Axillary (!) 112 (!) 35 97 % -- --   06/02/25 0759 111/66 -- -- (!) 109 20 96 % -- --   06/02/25 0630 (!) 105/55 -- -- (!) 113 20 100 % -- --   06/02/25 0559 -- -- -- -- -- 92 % -- --   06/02/25 0530 (!) 116/57 -- -- (!) 112 -- 95 % -- --   06/02/25 0330 (!) 123/57 -- -- (!) 114 -- 96 % -- --   06/02/25 0300 124/69 -- -- (!) 112 (!) 30 96 % -- --   06/02/25 0236 -- -- -- (!) 112 (!) 24 99 % -- --   06/02/25 0229 138/65 -- -- (!) 110 (!) 30 99 % -- --   06/02/25 0200 (!) 120/58 37.3 °C (99.1 °F) Temporal (!) 108 (!) 28 (!) 83 % -- --   06/02/25 0100 (!) 117/58 -- -- 100 (!) 22 92 % -- --  "  25 0000 137/63 -- -- (!) 106 20 94 % -- --   25 2350 (!) 127/53 36.6 °C (97.8 °F) Temporal (!) 109 (!) 22 (!) 90 % -- --   25 2300 (!) 106/57 -- -- 83 20 92 % -- --   25 2210 (!) 104/57 -- -- 82 20 92 % -- --   25 2120 (!) 106/50 -- -- 89 (!) 26 92 % -- --   25 (!) 108/56 -- -- 79 (!) 26 99 % -- --   25 2100 (!) 111/57 -- -- 79 (!) 21 97 % -- --   25 (!) 107/51 -- -- 85 (!) 22 95 % -- --   25 (!) 98/53 -- -- 91 (!) 24 97 % -- --   25 (!) 104/55 -- -- 86 16 92 % -- --   25 194 (!) 91/52 -- -- 89 18 92 % -- --   25 1930 (!) 88/51 -- -- 97 (!) 30 (!) 82 % -- --   25 191 (!) 96/52 -- -- -- -- 97 % -- --   25 1747 (!) 86/54 -- -- 85 (!) 29 92 % -- --   25 1707 (!) 87/52 36.3 °C (97.3 °F) Temporal 81 20 95 % -- --   25 1613 -- -- -- -- -- -- -- 99.8 kg (220 lb)   25 1602 -- -- -- -- -- 93 % -- --   25 1540 (!) 115/56 -- -- 95 18 -- -- --   25 1520 (!) 106/59 -- -- -- -- -- -- --   25 1517 -- -- -- -- (!) 22 -- -- --   25 1501 (!) 111/49 -- -- 90 18 -- -- --   25 1445 (!) 81/44 -- -- 86 18 -- -- --   25 1431 (!) 92/49 -- -- 77 20 -- -- --   25 1353 (!) 82/56 36.3 °C (97.3 °F) -- 96 (!) 28 -- 1.854 m (6' 1\") 90.7 kg (200 lb)       Temp (72hrs), Av.4 °C (97.5 °F), Min:35 °C (95 °F), Max:37.3 °C (99.1 °F)      Physical Exam:    General Appearance:    Alert, cooperative, no distress, appears stated age   Head:    Normocephalic, without obvious abnormality, atraumatic   Lungs:   Decreased breath sounds, bibasilar crackles   Chest wall:    No tenderness or deformity   Heart:    Regular rate and rhythm, S1 and S2 normal, no murmur, rub   or gallop   Abdomen:     Soft, non-tender, bowel sounds active all    Extremities:  Musculoskeletal   Extremities normal, atraumatic, no cyanosis or edema    No injury or deformity   Skin:   Skin color, texture, turgor normal, " no rashes or lesions   Neurologic:     Behavior/  Emotional:   Aox3       Appropriate, cooperative       Lines, Drains, Airways, Wounds:  Peripheral IV (Adult) 06/01/25 Right Antecubital (Active)   Number of days: 2       Peripheral IV (Adult) 06/01/25 Anterior;Distal;Left;Upper Arm (Active)   Number of days: 2       Urethral Catheter Latex;Temperature probe 16 Fr (Active)   Number of days: 2       Labs:    CBC Results         06/04/25 06/03/25 06/01/25     0514 0519 1418    WBC 21.62 27.68 25.23    RBC 2.39 2.55 3.11    HGB 8.3 8.7 10.8    HCT 26.2 28.0 33.2    .6 109.8 106.8    MCH 34.7 34.1 34.7    MCHC 31.7 31.1 32.5    PLT 99 117 149           Comment for HGB at 0519 on 06/03/25: ALL RESULTS HAVE BEEN CHECKED    Comment for PLT at 0514 on 06/04/25: CONFIRMED WITH SMEAR ESTIMATE SPECIMEN QUALITY CHECKED          BMP Results         06/04/25 06/03/25 06/02/25     0514 0519 0603     134 132    K 4.0 4.2 3.9    Cl 102 102 100    CO2 25 23 23    Glucose 223 227 228    BUN 29 26 23    Creatinine 1.1 0.8 0.9    Calcium 8.7 8.5 8.8    Anion Gap 7 9 9    EGFR >60.0 >60.0 >60.0           Comment for K at 0603 on 06/02/25: Results obtained on plasma. Plasma Potassium values may be up to 0.4 mEQ/L less than serum values. The differences may be greater for patients with high platelet or white cell counts.    Comment for EGFR at 0514 on 06/04/25: Calculation based on the Chronic Kidney Disease Epidemiology Collaboration (CKD-EPI) equation refit without adjustment for race.    Comment for EGFR at 0519 on 06/03/25: Calculation based on the Chronic Kidney Disease Epidemiology Collaboration (CKD-EPI) equation refit without adjustment for race.    Comment for EGFR at 0603 on 06/02/25: Calculation based on the Chronic Kidney Disease Epidemiology Collaboration (CKD-EPI) equation refit without adjustment for race.          PT/PTT Results         06/04/25 06/03/25 06/03/25     0514 1901 1236     95 103            Comment for PTT at 0514 on 06/04/25: The Standard Therapeutic Range for Heparin is 74 to 106 seconds.    Comment for PTT at 1901 on 06/03/25: The Standard Therapeutic Range for Heparin is 74 to 106 seconds.    Comment for PTT at 1236 on 06/03/25: The Standard Therapeutic Range for Heparin is 74 to 106 seconds.          UA Results         06/01/25 2026    Color Yellow    Clarity Clear    Glucose Negative    Bilirubin Negative    Ketones Negative    Sp Grav 1.035    Blood Negative    Ph 6.0    Protein Negative    Urobilinogen 0.2    Nitrite Negative    Leuk Est Negative           Comment for Blood at 2026 on 06/01/25: The sensitivity of the occult blood test is equivalent to approximately 4 intact RBC/HPF.    Comment for Leuk Est at 2026 on 06/01/25: Results can be falsely negative due to high specific gravity, some antibiotics, glucose >3 g/dl, or WBC other than neutrophils.          Lactate Results         06/01/25 06/01/25 06/01/25     2342 2050 1936    Lactate 1.9 2.2 2.4            Microbiology Results       Procedure Component Value Units Date/Time    Blood Culture Blood, Venous [[id number]]  (Abnormal) Collected: 06/01/25 1511    Specimen: Blood, Venous Updated: 06/02/25 1651     Culture **Positive Culture**     Gram Stain Result Gram positive cocci in clusters    Blood Culture Blood, Venous [[id number]]  (Abnormal) Collected: 06/01/25 1448    Specimen: Blood, Venous Updated: 06/02/25 1448     Culture **Positive Culture**     Gram Stain Result Gram positive cocci in pairs and clusters    Blood Culture PCR Panel Blood, Venous [[id number]]  (Abnormal) Collected: 06/01/25 1448    Specimen: Blood, Venous Updated: 06/02/25 1447     Candida albicans Not Detected     Candida auris Not Detected     Candida glabrata Not Detected     Candida krusei Not Detected     Candida parapsilosis Not Detected     Cryptococcus neoformans/gattii Not Detected     Enterococcus faecalis Not Detected     Enterococcus faecium Not  Detected     Enterobacterales Not Detected     Enterobacter cloacae complex Not Detected     Escherichia coli Not Detected     Klebsiella oxytoca Not Detected     Klebsiella pneumoniae Not Detected     Klebsiella aerogenes Not Detected     Proteus Not Detected     Salmonella species Not Detected     Serratia marcescens Not Detected     Haemophilus influenzae Not Detected     Listeria monocytogenes Not Detected     Neisseria meningitidis Not Detected     Pseudomonas aeruginosa Not Detected     Stenotrophomonas maltophilia Not Detected     Bacteroides fragilis Not Detected     Staphylococcus aureus Not Detected     Staphylococcus ludgnensis Not Detected     Staphylococcus epidermidis Detected     Streptococcus Not Detected     Streptococcus agalactiae (Group B) Not Detected     Streptococcus pneumoniae Not Detected     Streptococcus pyogenes (Group A) Not Detected     KPC (Carbapenem Resistance Gene) Not Applicable     mecA/C Detected     mecA/C and MREJ (MRSA) Not Applicable     Jasmin/B (Vancomycin Resistance Gene) Not Applicable     CTX-M (ESBL) Not Applicable     IMP Not Applicable     mcr-1 Not Applicable     NDM Not Applicable     OXA-48-like Not Applicable     VIM Not Applicable     Comment: --    SARS-COV-2 (COVID-19)/ FLU A/B, AND RSV, PCR Nasopharynx [[id number]]  (Normal) Collected: 06/01/25 1419    Specimen: Nasopharyngeal Swab from Nasopharynx Updated: 06/01/25 1502     SARS-CoV-2 (COVID-19) Negative     Influenza A Negative     Influenza B Negative     Respiratory Syncytial Virus Negative    Narrative:      Testing performed using real-time PCR for detection of COVID-19. EUA approved validation studies performed on site.             Pathology Results       ** No results found for the last 720 hours. **            Echo:         Imaging:    Radiology Imaging    XR CHEST 1 VW    Narrative  CLINICAL HISTORY:   Increased oxygen requirement    COMMENT:    PROCEDURE: Single frontal view of the  chest.    COMPARISON:   Prior chest x-ray and chest CT dated June 1, 2025    Support lines, tubes, devices, and surgical hardware, material: Monitoring  leads/wires overlie the patient.    Lungs and Pleura: Diffuse bilateral hazy airspace opacity and patchy  consolidation which has progressed. Possible small right pleural effusion. No  pneumothorax.    Cardiovascular and Mediastinum: The cardiomediastinal silhouette is stable.  Cardiomegaly.    Other: No acute osseous abnormality.    Impression  IMPRESSION:  Diffuse bilateral airspace opacity, progressed      Assessment   >> Acute hypoxic respiratory failure likely due to multifocal pneumonia, though multifactorial with history of CHF, COPD, pulm fibrosis  >> GPC's on blood culture, BioFire MRSE  >> History of lung cancer     Plan  - c/w ceftriaxone and azithromycin   - Unfortunately now blood cultures 2 of 2 positive for GPC's with BioFire MRSE.  Patient reportedly with no indwelling devices/foreign materials.  Patient's daughter did report patient has had multiple skin injuries over the recent history.    - c/w Vancomycin [id number]     -Follow-up blood cultures for final spp as well as repeat blood cultures  - TTE no obvious mass or vegetation  -Obtain respiratory culture possible     Infectious disease will continue to follow-up  Case discussed with Dr. Santa Zuniga MD  Infectious Disease   [facility]   Area L Indication Text: Tumors in this location are included in Area L (trunk and extremities).  Mohs surgery is indicated for larger tumors, or tumors with aggressive histologic features, in these anatomic locations.

## 2025-06-04 NOTE — PROGRESS NOTES
Hospital Medicine     Daily Progress Note       SUBJECTIVE   Seen and examined laying in bed. Says he hasn't had a bowel movement in a week and notes some abdominal discomfort. Otherwise offers no acute complaints. I updated his family at the bedside.      OBJECTIVE   Vital signs in last 24 hours:  Temp:  [36.3 °C (97.3 °F)-36.9 °C (98.5 °F)] 36.4 °C (97.6 °F)  Heart Rate:  [] 94  Resp:  [] 28  BP: ()/(47-59) 115/55  FiO2 (%) (Set):  [60 %-70 %] 60 %    Intake/Output Summary (Last 24 hours) at 6/4/2025 1515  Last data filed at 6/4/2025 0700  Gross per 24 hour   Intake 1112.42 ml   Output 1130 ml   Net -17.58 ml       Physical Exam  Constitutional:       Appearance: Normal appearance.   HENT:      Head: Normocephalic and atraumatic.      Mouth/Throat:      Mouth: Mucous membranes are moist.   Eyes:      Extraocular Movements: Extraocular movements intact.      Pupils: Pupils are equal, round, and reactive to light.   Cardiovascular:      Rate and Rhythm: Normal rate and regular rhythm.   Pulmonary:      Comments: Faint crackles in bilateral bases  Abdominal:      General: Bowel sounds are normal.      Palpations: Abdomen is soft.   Skin:     General: Skin is warm and dry.   Neurological:      General: No focal deficit present.      Mental Status: He is alert and oriented to person, place, and time. Mental status is at baseline.          LINES, DRAINS, AIRWAYS, WOUNDS   Peripheral IV (Adult) 06/01/25 Right Antecubital (Active)   Number of days: 3       Peripheral IV (Adult) 06/01/25 Anterior;Distal;Left;Upper Arm (Active)   Number of days: 3       Urethral Catheter Latex;Temperature probe 16 Fr (Active)   Number of days: 3      LABS, IMAGING, TELEMETRY   CHEMISTRIES   Results from last 7 days   Lab Units 06/04/25  0514 06/03/25  0519 06/02/25  0603 06/01/25  1418   SODIUM mEQ/L 134* 134* 132* 127*  126*   POTASSIUM mEQ/L 4.0 4.2 3.9 3.9  3.9   CHLORIDE mEQ/L 102 102 100 94*  94*    CO2 mEQ/L 25 23 23 19*  22   BUN mg/dL 29* 26* 23 33*  33*   CREATININE mg/dL 1.1 0.8 0.9 1.1  1.3   GLUCOSE mg/dL 223* 227* 228* 273*  275*   CALCIUM mg/dL 8.7 8.5* 8.8 9.2  9.2   EGFR mL/min/1.73m*2 >60.0 >60.0 >60.0 >60.0  55.5*   ANION GAP mEQ/L 7 9 9 14  10   MAGNESIUM mg/dL 2.1  --   --   --      CBC RESULTS   Results from last 7 days   Lab Units 06/04/25  0514 06/03/25  0519 06/01/25  1418   WBC K/uL 21.62* 27.68* 25.23*   HEMOGLOBIN g/dL 8.3* 8.7* 10.8*   HEMATOCRIT % 26.2* 28.0* 33.2*   PLATELETS K/uL 99* 117* 149*       Radiology Reports in last 24hX-RAY CHEST 1 VIEW  Result Date: 6/3/2025  CLINICAL HISTORY:  Dyspnea COMMENT:  A portable erect AP view of the chest was obtained 6/3/2025 5:18 PM COMPARISON:  Chest radiograph from 6/2/2025 Again noted is extensive multifocal interstitial airspace disease, similar to slightly progressed in the left upper lobe when compared to the prior examination.  No large effusion or pneumothorax.      The cardiomediastinal contours are stable.     IMPRESSION: Extensive multifocal interstitial airspace disease bilaterally, slightly progressed in the left upper lobe when compared to the prior examination.         ASSESSMENT AND PLAN     # Acute on chronic hypoxic respiratory failure  - Baseline 2 L/min, increased to 15 L/min on admission, worsening, now on HHFNC 60/60  - Continue to wean oxygen, with goal SpO2 89-92%  - VBG without hypercapnia  - worsening CXR 6/3  - currently on: acetylcysteine 200 mg/mL 200 mg nebulizer BID  - currently on: albuterol-ipratropium 2.5 mg-0.5 mg nebulizer QID  - currently on: formoterol-mometasone 5 mcg-0.2 mg 2 puff inhalation BID  - currently on: tiotropium 2.5 mcg 2 puff inhalation daily     # Multifocal pneumonia, community-acquired  # sepsis  - Given hypoxia, leukocytosis, infiltrates, recommend treating for pneumonia  - SIRS: HR: 91 (6/1/25 20:00), RR: 22 (6/1/25 20:40)  - lactate: 1.9 (6/1/25 23:42) down from 2.2 (6/1/25  20:50), high 2.4 (6/1/25 19:36)  - Sputum culture pending  - white blood cell count: 21.6 (6/4/25) down from 27.7 (6/3/25)  - currently on: azithromycin 500 mg iv q24h (06/01/25 - current)  - currently on: ceftriaxone 1 g iv q24h (06/01/25 - current)  - currently on: vancomycin 1250 mg iv q12h (06/03/25 - current)  - currently on: methylprednisolone 40 mg iv q12h (06/04/25 - current)     # MRSE bacteremia  - on Vanc  - ID following     # Acute on chronic systolic and diastolic congestive heart failure  - BNP 1000 on admission with signs of hypervolemia (CT with bilateral groundglass and hyponatremia)  - Monitor renal function, I's and O's, blood pressure  - Echocardiogram with RV failure with only mild PHTN  - Cards following  - Given clinical worsening, I would consider diuresing vs RHC to measure left filling pressures. Defer to Cards.  - continue home: lisinopril 10 mg po daily  - continue home: metoprolol tartrate 12.5 mg po BID     # Elevated troponin  - Heparin infusion  - Cardiology consultation  - demand ischemia     # Combined Pulmonary Fibrosis and Emphysema (CPFE)  - Continue triple therapy inhaler, with albuterol as needed  - Target SpO2 89-92%  - h/o XRT to RLL for palliative empiric treatment of lung cancer  - currently on: azithromycin 500 mg iv q24h (06/01/25 - current)  - currently on: ceftriaxone 1 g iv q24h (06/01/25 - current)  - currently on: albuterol-ipratropium 2.5 mg-0.5 mg nebulizer QID  - currently on: formoterol-mometasone 5 mcg-0.2 mg 2 puff inhalation BID  - currently on: methylprednisolone 40 mg iv q12h (06/04/25 - current)  - currently on: tiotropium 2.5 mcg 2 puff inhalation daily     # Lung cancer  - Concern for metastatic disease  - Oncology following  - Planning for IR liver biopsy when stable     # Thrombocytopenia  - PT: 18.4 sec (6/1/25), aPTT: 135 sec (6/4/25)  - Platelets: 99 1000/uL (6/4/25) down from 117 1000/uL (6/3/25)     # Permanent atrial fibrillation  - heart rate  range:  (since 6/3/25 22:00)  - [id number] score: 4 (6/4/25), moderate-high risk of stroke (4.8% per year)  - continue home: metoprolol tartrate 12.5 mg po BID  - currently on: heparin 100 - 4000 units/hr iv Titrated continuously     # Diabetes Mellitus, Type 2  - glucose range: 195-315 (since 6/4/25)  - continue home: gabapentin 300 mg po daily  - currently on: insulin glargine 12 units sc daily  - currently on: insulin lispro 2 - 10 units sc TID  - holding home: glipizide 5 mg po     # Anemia: chronic, stable, macrocytic  - hemoglobin: 8.3 (6/4/25) down from 8.7 (6/3/25)  - mean corpuscular volume: 110 (6/4/25)  - continue home: cyanocobalamin 1000 mcg po daily  - currently on: folic acid 1 mg po daily     # IDALMIS with CKD stage 3: uptrending  - creatinine: 1.1 (6/4/25) up from 0.8 (6/3/25), high 1.3 (6/1/25)  - continue home: lisinopril 10 mg po daily       VTE Prophylaxis:  Current anticoagulants:  heparin (porcine) bolus from bag 3,500-7,050 Units, intravenous, q6h PRN  heparin infusion in D5W 100 units/mL, intravenous, Titrated      Code Status: DNR (A.N.D.)        Estimated Discharge Date: 6/6/2025   Disposition Planning: Pending hospital course; WBC, wean O2, final cultures, liver bx, PT/OT       Radha Pratt, DO  6/4/2025

## 2025-06-04 NOTE — PLAN OF CARE
Care Coordination Discharge Plan Note     Discharge Needs Assessment  Concerns to be Addressed: adjustment to diagnosis/illness, care coordination/care conferences, discharge planning, decision-making  Current Discharge Risk: physical impairment, chronically ill    Anticipated Discharge Plan  Anticipated Discharge Disposition: home with home health, inpatient hospice, skilled nursing facility  Type of Home Care Services: home OT, home PT, nursing, home hospice    Type of Skilled Nursing Care Services: OT, PT, nursing      Patient Choice  Offered/Gave Vendor List:         ---------------------------------------------------------------------------------------------------------------------    Interdisciplinary Discharge Plan Review:  Participants:advanced practice provider, nursing, patient, family/lay caregiver    Concerns Comments: chart reviewed & milena spoke to [facility] who stated that talib is 80% service connected. per [facility] she will check about [facility] since patient does not have a pcp thru [facility]. milena spoke to [facility] who is following brice but stated taht there is no [facility] md ot sign orders for snf/rehab or home care since does not have a pcp. sw discussed that [facility] is looking into this. patient has been on bipap /highflow o2. patient verified his last four digits of SSN & this was corrected in epic. talib is with worsening respiratory status. sw asked to speak to family who wanted update on d/c planning. sw disucssed that patient is not stable at this time for d/c planning d/t high o2 needs. patient will need ir for liver bx when more medically stable. case management to follow & [facility]    Discharge Plan:   Disposition/Destination:   /    Discharge Facility:    Community Resources:      Discharge Transportation:  Is Out of Hospital DNR needed at Discharge:    Does patient need discharge transport?

## 2025-06-04 NOTE — HOSPITAL COURSE
Juaquin is a 79 y.o. male admitted on 6/1/2025 with Shortness of breath [R06.02]  Hyponatremia [E87.1]  Elevated troponin [R79.89]  Liver lesion [K76.9]  Lung consolidation (CMS/HCC) [J18.1]  Sepsis (CMS/HCC) [A41.9]  Pneumonia of both lower lobes due to infectious organism [J18.9]  Acute on chronic congestive heart failure, unspecified heart failure type (CMS/HCC) [I50.9]. Principal problem is Sepsis due to Streptococcus species without acute organ dysfunction (CMS/HCC).    Past Medical History  Juaquin has a past medical history of A-fib (CMS/HCC), Anemia, CHF (congestive heart failure) (CMS/HCC), COPD (chronic obstructive pulmonary disease) (CMS/HCC), Hyperlipidemia, Kidney disease, and Type 2 diabetes mellitus (CMS/HCC).    History of Present Illness   79 y.o. with a PMH of HF, COPD/PF admitted for sepsis w/ ADHF in s/o multifocal PNA, imaging with multiple liver lesions and concern for metastatic dx    EKG showed A-fib with a controlled ventricular response and diffuse T wave inversions anterolaterally.  He was also noted to have subtle ST depression laterally.     Chest x-ray on admission documented mild pulmonary edema with trace bilateral pleural effusions suggestive of mild CHF.   Req high flow O2 vs Bipap

## 2025-06-04 NOTE — PROGRESS NOTES
"   Pulmonary Progress Note       SUBJECTIVE   Interval History:  -Feeling about the same  -O2 weaned from 15LPM to 10LPM  -Reports abdominal tenderness today  -Still labored breathing     OBJECTIVE        Vital Signs:   Visit Vitals  BP (!) 119/59 (BP Location: Right upper arm, Patient Position: Lying)   Pulse 82   Temp 36.7 °C (98 °F) (Oral)   Resp (!) 28   Ht 1.854 m (6' 1\")   Wt 99.8 kg (220 lb)   SpO2 93%   BMI 29.03 kg/m²       I/O's:    Intake/Output Summary (Last 24 hours) at 6/4/2025 0944  Last data filed at 6/4/2025 0700  Gross per 24 hour   Intake 1945.42 ml   Output 1130 ml   Net 815.42 ml       Medications:    Reviewed. Pertinent medications as below.     azithromycin  500 mg intravenous q24h INT    cefTRIAXone  1 g intravenous q24h INT    cyanocobalamin  1,000 mcg oral Daily    famotidine  20 mg oral Nightly    folic acid  1 mg oral Daily    gabapentin  300 mg oral Nightly    guaiFENesin  600 mg oral BID    insulin glargine U-100  12 Units subcutaneous Nightly    insulin lispro U-100  2-10 Units subcutaneous TID with meals    ipratropium-albuteroL  3 mL nebulization q6h RUTHY    lisinopriL  10 mg oral Daily    metoprolol tartrate  12.5 mg oral BID    mometasone-formoterol  2 puff inhalation BID (8a, 8p)    And    tiotropium bromide  2 puff inhalation Daily (8a)    pantoprazole  40 mg intravenous q24h    pravastatin  20 mg oral Daily    sodium chloride  1 g oral TID with meals    [START ON 6/5/2025] thiamine  100 mg oral Daily    vancomycin  1,250 mg intravenous q12h INT       Anti-infectives (From admission, onward)      Start     Dose/Rate Route Frequency Ordered Stop    06/03/25 2100  vancomycin 1.25 gram/250 mL IVPB in NSS         1,250 mg  166.7 mL/hr over 90 Minutes intravenous Every 12 hours interval 06/03/25 1004      06/03/25 0737  vancomycin Therapy by Pharmacy Protocol 1 each         1 each Other evaluate daily 06/03/25 0737      06/02/25 1515  azithromycin (ZITHROMAX) IVPB 500 mg in 250 mL NSS " "vial in bag         500 mg  250 mL/hr over 60 Minutes intravenous Every 24 hours interval 06/01/25 1752      06/02/25 1515  cefTRIAXone (ROCEPHIN) IVPB 1 g in 100 mL NSS vial in bag         1 g  200 mL/hr over 30 Minutes intravenous Every 24 hours interval 06/01/25 1752              PHYSICAL EXAMINATION        Vital Signs:   Visit Vitals  BP (!) 119/59 (BP Location: Right upper arm, Patient Position: Lying)   Pulse 82   Temp 36.7 °C (98 °F) (Oral)   Resp (!) 28   Ht 1.854 m (6' 1\")   Wt 99.8 kg (220 lb)   SpO2 93%   BMI 29.03 kg/m²       I/O's:    Intake/Output Summary (Last 24 hours) at 6/4/2025 0944  Last data filed at 6/4/2025 0700  Gross per 24 hour   Intake 1945.42 ml   Output 1130 ml   Net 815.42 ml       General: The patient is in no acute distress.  Resting comfortably in bed.  HEENT: Mucous membranes are moist.  Sclera are anicteric.  Neck: Supple.  No cervical lymphadenopathy  Cardiovascular: S1 and S2 are present.  There are no murmurs.  Lungs: Bilateral crackles. Poor air movement with bronchial breath sounds.   Abdomen: Soft, nontender and nondistended  Extremities: Trace bilateral lower extremity pitting edema  Neuro: Awake and alert.  Spontaneously moving all extremities       Diagnostic Data      Labs:    I have personally reviewed all pertinent patient laboratory results. Labs of note discussed below:    ABG Results         06/01/25 06/01/25 2050 1617    pH -- 7.43    pCO2 -- 29    pO2 -- 49    HCO3 -- 21.3    Base Excess -- -4.3    Source Of Oxygen nasal cannula 2L NC          Results from last 7 days   Lab Units 06/04/25  0514 06/03/25  0519 06/02/25  0603 06/01/25  1418   CREATININE mg/dL 1.1 0.8 0.9 1.1  1.3   BUN mg/dL 29* 26* 23 33*  33*   WBC K/uL 21.62* 27.68*  --  25.23*   HEMOGLOBIN g/dL 8.3* 8.7*  --  10.8*   PLATELETS K/uL 99* 117*  --  149*       Micro:   Microbiology Results       Procedure Component Value Units Date/Time    Blood Culture Blood, Venous [[id number]]  (Abnormal) " Collected: 06/01/25 1511    Specimen: Blood, Venous Updated: 06/02/25 1651     Culture **Positive Culture**     Gram Stain Result Gram positive cocci in clusters    Blood Culture Blood, Venous [[id number]]  (Abnormal) Collected: 06/01/25 1448    Specimen: Blood, Venous Updated: 06/02/25 1448     Culture **Positive Culture**     Gram Stain Result Gram positive cocci in pairs and clusters    Blood Culture PCR Panel Blood, Venous [[id number]]  (Abnormal) Collected: 06/01/25 1448    Specimen: Blood, Venous Updated: 06/02/25 1447     Candida albicans Not Detected     Candida auris Not Detected     Candida glabrata Not Detected     Candida krusei Not Detected     Candida parapsilosis Not Detected     Cryptococcus neoformans/gattii Not Detected     Enterococcus faecalis Not Detected     Enterococcus faecium Not Detected     Enterobacterales Not Detected     Enterobacter cloacae complex Not Detected     Escherichia coli Not Detected     Klebsiella oxytoca Not Detected     Klebsiella pneumoniae Not Detected     Klebsiella aerogenes Not Detected     Proteus Not Detected     Salmonella species Not Detected     Serratia marcescens Not Detected     Haemophilus influenzae Not Detected     Listeria monocytogenes Not Detected     Neisseria meningitidis Not Detected     Pseudomonas aeruginosa Not Detected     Stenotrophomonas maltophilia Not Detected     Bacteroides fragilis Not Detected     Staphylococcus aureus Not Detected     Staphylococcus ludgnensis Not Detected     Staphylococcus epidermidis Detected     Streptococcus Not Detected     Streptococcus agalactiae (Group B) Not Detected     Streptococcus pneumoniae Not Detected     Streptococcus pyogenes (Group A) Not Detected     KPC (Carbapenem Resistance Gene) Not Applicable     mecA/C Detected     mecA/C and MREJ (MRSA) Not Applicable     Jasmin/B (Vancomycin Resistance Gene) Not Applicable     CTX-M (ESBL) Not Applicable     IMP Not Applicable     mcr-1 Not Applicable     NDM Not  Applicable     OXA-48-like Not Applicable     VIM Not Applicable     Comment: --    SARS-COV-2 (COVID-19)/ FLU A/B, AND RSV, PCR Nasopharynx [[id number]]  (Normal) Collected: 06/01/25 1419    Specimen: Nasopharyngeal Swab from Nasopharynx Updated: 06/01/25 1502     SARS-CoV-2 (COVID-19) Negative     Influenza A Negative     Influenza B Negative     Respiratory Syncytial Virus Negative    Narrative:      Testing performed using real-time PCR for detection of COVID-19. EUA approved validation studies performed on site.             Imaging:    Personally reviewed:  Radiology Images  Recent Results (from the past 6 weeks)   X-RAY CHEST 1 VIEW    Collection Time: 06/01/25  2:44 PM    Narrative    CLINICAL HISTORY: sob    COMMENT: Frontal view of the chest obtained.    COMPARISON: None available.    LINES/TUBES: None.    OTHER: Mild pulmonary edema with trace bilateral pleural effusions suggestive of  mild CHF. Superimposed patchy airspace opacities, most significant in the lower  lobes, suspicious for superimposed multifocal pneumonia. No definite  pneumothorax or large pleural effusion. Cardiomediastinal silhouette mostly  obscured and not well evaluated.      Impression    IMPRESSION: Please see comment. Recommend follow-up radiographs or chest CT in  6-8 weeks.       CT ANGIOGRAPHY CHEST/ABDOMEN/PELVIS WITH AND WITHOUT IV CONTRAST    Collection Time: 06/01/25  4:56 PM    Narrative    CLINICAL HISTORY: Shortness of breath      Impression    IMPRESSION:  1.  No evidence of pulmonary embolus or acute aortic syndrome.  2.  Extensive bilateral groundglass opacity on a background of fibrotic  interstitial lung disease which may represent acute exacerbation of chronic ILD  or a new infectious, inflammatory, hemorrhagic or edema from congestive heart  failure.  3.  Right lower lobe lung consolidation with associated mediastinal and hilar  adenopathy which may represent infection or neoplasm.  4.  Multiple liver lesions  suggesting metastatic disease.  5.  Multiple other chronic findings.      COMMENT:  Technique: Computed tomography angiography of the chest, abdomen and pelvis was  performed from the thoracic inlet through the pelvis using a dissection protocol  without and with utilizing 100 cc Isovue-370.    3D MIP and/or volume rendered image reconstruction performed and reviewed.    CT DOSE:  One or more dose reduction techniques (e.g. automated exposure  control, adjustment of the mA and/or kV according to patient size, use of  iterative reconstruction technique) utilized for this examination.    Comparisons studies: Chest x-ray dated the same day.    Lung parenchyma: Extensive bilateral patchy groundglass opacity, peripheral  reticulation, mild bronchial dilatation and minimal architectural distortion.  There is right lower lobe consolidation    Thyroid:  Normal.    Mediastinum: Mildly prominent lymph nodes the largest a subcarinal node  measuring up to 2.6 cm.    Dariana: Mildly prominent bilateral lymph nodes.    Heart and pericardium: Severe coronary calcification.  Cardiomegaly.    Pulmonary artery: Normal    Aorta: Normal    Chest wall and pleura: Normal.    Axilla: Normal.    Liver: Extensive liver lesions the largest measures up to 4.3 cm.    Spleen: Normal.    Adrenals: Normal.    Pancreas: Normal.    Kidneys, ureters and bladder: Normal.    Gallbladder: Removed    Retroperitoneal structures: Normal.    Bowel and mesentery: No inflammatory or obstructive process.  Colonic  diverticuli.    Vessels: Aortoiliac atherosclerotic disease.  No evidence of acute aortic  syndrome.    Lymph nodes: None enlarged.    Pelvic structures:  Normal.    Bones: Degenerative change.         X-RAY CHEST 1 VIEW    Collection Time: 06/02/25  2:24 AM    Narrative    CLINICAL HISTORY:   Increased oxygen requirement    COMMENT:    PROCEDURE: Single frontal view of the chest.    COMPARISON:   Prior chest x-ray and chest CT dated June 1,  2025    Support lines, tubes, devices, and surgical hardware, material: Monitoring  leads/wires overlie the patient.    Lungs and Pleura: Diffuse bilateral hazy airspace opacity and patchy  consolidation which has progressed. Possible small right pleural effusion. No  pneumothorax.    Cardiovascular and Mediastinum: The cardiomediastinal silhouette is stable.  Cardiomegaly.    Other: No acute osseous abnormality.      Impression    IMPRESSION:  Diffuse bilateral airspace opacity, progressed             Transthoracic echo (TTE) complete    Collection Time: 06/03/25 10:21 AM   Result Value    BSA 2.27    EMA A2C 7.96    EMA A4C 7.18    LAAs A2C 34.20    LAAs A4C 30.70    Left Atrium Systolic Volume 120.00    LA volume 108.00    Left Atrium End Systolic Volume (BP method) 119.00    LAD 2D 5.60    LA/Ao Ratio 1.75    AV Mean Gradient 7.00    AV VTI 31.80    AV Mean Velocity 1.25    AV Peak Velocity-S 1.84    AV Peak Gradient 14.00    AV Area 1.41    Ao Root Annulus 3.20    Ascend Ao 3.20    IVS 1.11    LVIDd 5.24    LVIDs 3.95    LVOT Diameter 2D 2.20    LVOT Mean Gradient 2.00    LVOT VTI 15.10    LVOT Mean Velocity  0.63    LVOT Peak Velocity 0.87    LVOT Peak Gradient 3.00    PW 1.27    LVPWd 1.27    FS 24.62    LVOT Cross-section Area 3.80    LVOT Stroke Volume 57.37    E wave deceleration time 188.00    MV Stenosis P1/2t 55.00    MV Peak E-Wave 0.98    MVA P1/2  4.00    S' lat velocity 0.11    TAPSE 1.23    TR Peak Velocity 2.60    TR Peak Gradient 27.04    LVOT Stroke Volume Index 25.27    LA Volume Index 47.58    Left Atrial End Systolic Volume Index 52.86    LA ESV INDEX (BP) 52.42    AV Velocity Ratio 0.47    Aortic Valve Area by Continuity of VTI 1.81    Valve area - Index 0.80    Valve area - Index 0.6    ZLVPWD 0.66    ZLVIDS -1.63    ZLVIDD -4.25    Narrative      Left Ventricle: Normal ventricle size. Normal wall thickness. Preserved   systolic function. Estimated EF 55%. There is a ventricular septal  bounce   that may represent  RV pressure overload Indeterminate diastolic filling   pattern.    Right Ventricle: Moderately dilated ventricle size. Moderately reduced   systolic function. Global hypokinesis.    Left Atrium: Mildly dilated atrium.    Right Atrium: Atrium not well visualized.    Aortic Valve: Valve not well seen but likely trileaflet.  Moderate   diffuse calcification present with reduced excursion. No regurgitation.   Mild stenosis. Peak velocity = 1.84 m/s. Mean gradient = 7.00 mmHg.   Calculated area by cont eq = 1.81 cm2. Calculated dimensionless index =   0.47.    Mitral Valve: Mild bileaflet thickening. No regurgitation.    Tricuspid Valve: Structure is grossly normal. Mild regurgitation.  PA   pressure estimated at 48 to 50 mmHg.    Pulmonic Valve: Valve not well visualized. Grossly normal structure.    Aorta: Aortic root normal. Sinuses of Valsalva normal-sized. Ascending   aorta normal-sized.    Pericardium: No evidence of pericardial effusion.    There is no previous echocardiogram available for comparison.     X-RAY CHEST 1 VIEW    Collection Time: 06/03/25  5:18 PM    Narrative    CLINICAL HISTORY:  Dyspnea    COMMENT:  A portable erect AP view of the chest was obtained 6/3/2025 5:18 PM    COMPARISON:  Chest radiograph from 6/2/2025    Again noted is extensive multifocal interstitial airspace disease, similar to  slightly progressed in the left upper lobe when compared to the prior  examination.  No large effusion or pneumothorax.      The cardiomediastinal  contours are stable.      Impression    IMPRESSION:  Extensive multifocal interstitial airspace disease bilaterally, slightly  progressed in the left upper lobe when compared to the prior examination.               ASSESSMENT AND PLAN      # Acute on chronic hypoxic respiratory failure  - Baseline 2 L/min, increased to 15 L/min on admission, worsening, now on HHFNC 60/60  - Continue to wean oxygen, with goal SpO2 89-92%  - VBG without  hypercapnia  - worsening CXR 6/3     # Multifocal pneumonia, community-acquired  - Given hypoxia, leukocytosis, infiltrates, recommend treating for pneumonia  - Started on ceftriaxone-azithromycin.  Will treat for 5-3 days respectively  - Sputum culture if able to expectorate  - Trend temperature, white blood cell count. WBC improving today.     # MRSE bacteremia  - on Vanc  - ID following     #Acute on chronic systolic and diastolic congestive heart failure  - BNP 1000 on admission with signs of hypervolemia (CT with bilateral groundglass and hyponatremia)  - Monitor renal function, I's and O's, blood pressure  - Echocardiogram with RV failure with only mild PHTN  - Cards following  - Given clinical worsening, I would consider diuresing vs RHC to measure left filling pressures. Defer to Cards.      #Elevated troponin, possible NSTEMI  - Heparin infusion  - Cardiology consultation     #Combined Pulmonary Fibrosis and Emphysema (CPFE)  - Continue triple therapy inhaler, with albuterol as needed  - Target SpO2 89-92%  - Since worsening, I will add Solumedrol 40mg IV q12.   - h/o XRT to RLL for palliative empiric treatment of lung cancer    #Lung cancer  - Concern for metastatic disease  - Oncology following  - Planning for IR liver biopsy when stable    #Encephalopathy  - No significant agitation, would not use Precedex     40 minutes critical care time spent in the direct care of this patient, review of data, imaging, examination, formulation of a management plan, and coordination of care with the team, consultants, nursing and respiratory therapy.          Raul Ward MD  Pulmonary & Critical Care Medicine   6/4/2025  9:44 AM

## 2025-06-04 NOTE — PROGRESS NOTES
Patient: Juaquin Linda  Location: [facility] 3219  MRN:  [id number]  Today's date:  6/4/2025    Attempted to see patient for therapy. Unable due to medical hold. Spoke with RN, pt not appropriate for therapy at this time due to respiratory status. Will hold PT, will continue to follow as able.

## 2025-06-04 NOTE — PROGRESS NOTES
Patient: Juaquin Linda  Location: [facility]  MRN:  [id number]  Today's date:  6/4/2025    Attempted to see patient for therapy. Unable due to medical hold (discussed with nursing, pt not stable for OT eval due to desatting req NRB with breakfast. OT to hold and follow as able/appropriate).

## 2025-06-05 PROBLEM — R06.02 SOB (SHORTNESS OF BREATH): Status: ACTIVE | Noted: 2025-01-01

## 2025-06-05 NOTE — DISCHARGE SUMMARY
Hospital Medicine    Inpatient Discharge Summary        BRIEF OVERVIEW   Patient: Juaquin Linda  Admission Date: 2025   : 1945 Discharge Date: 2025       PCP: Mendez Montaño  Disposition:      Destination:       Attending Provider: Radha Pratt DO Attending phys phone: [phone]    Code Status At Discharge: DNR (A.N.D.)    Juaquin Linda was seen by the palliative care team during this hospitalization.  Please see palliative care team documentation for further details.  Also reference the advance care planning (ACP) website (Advance Care Planning (Advance Directive)  Patient services  [facility] ) to learn more about completing an advance directive, including Health Care Power of  and Living Will documents.         ASSESSMENT AND PLAN       Brief Hospital Course  This is a 79 y.o. year-old male admitted on 2025 with a past medical history of A-fib on Eliquis, chronic anemia, HFpEF 40%, COPD not on home O2, HLD, CKD, DM, HTN who presented to the ED with worsening dyspnea. Upon arrival to the ED, patient was found to be septic, hypoxic, hypotensive in setting of multifocal pneumonia. He was started on broad spectrum antibiotics. ID, pulmonary, cardiology, heme/onc and palliative care were consulted. Patient's respiratory status continued to deteriorate despite max O2 support. Multiple discussions were had with family, palliative, and primary team. Family ultimately decided against any escalation of care and to focus on comfort on 25 . Patient passed away shortly thereafter surrounded by family and was pronounced  on 25 at 5:55 PM.    Exam on Day of Discharge  Temp:  [36.2 °C (97.1 °F)-37.1 °C (98.7 °F)] 37.1 °C (98.7 °F)  Heart Rate:  [] 72  Resp:  [24-30] 24  BP: ()/(32-95) 66/33  FiO2 (%) (Set):  [80 %-100 %] 100 %           DISCHARGE MEDICATIONS      Medication List        ASK your doctor about these medications       cyanocobalamin 1,000 mcg tablet  Commonly known as: VITAMIN B12  Take 1,000 mcg by mouth daily.  Dose: 1,000 mcg     doxycycline monohydrate 100 mg tablet  Commonly known as: ADOXA  Take 100 mg by mouth 2 (two) times a day.  Dose: 100 mg     famotidine 20 mg tablet  Commonly known as: PEPCID  Take 20 mg by mouth nightly as needed for heartburn.  Dose: 20 mg     gabapentin 300 mg capsule  Commonly known as: NEURONTIN  Take 300 mg by mouth nightly.  Dose: 300 mg     glipiZIDE 5 mg tablet  Commonly known as: GLUCOTROL  Take 5 mg by mouth 2 (two) times a day with breakfast and dinner.  Dose: 5 mg     LANTUS SOLOSTAR U-100 INSULIN 100 unit/mL (3 mL) pen  Inject 12 Units under the skin every morning.  Dose: 12 Units  Generic drug: insulin glargine U-100     lisinopriL 10 mg tablet  Commonly known as: PRINIVIL  Take 10 mg by mouth daily.  Dose: 10 mg     metoprolol tartrate 25 mg tablet  Commonly known as: LOPRESSOR  Take 25 mg by mouth daily.  Dose: 25 mg     simvastatin 10 mg tablet  Commonly known as: ZOCOR  Take 10 mg by mouth nightly.  Dose: 10 mg     spironolactone 25 mg tablet  Commonly known as: ALDACTONE  Take 25 mg by mouth daily.  Dose: 25 mg     TRELEGY ELLIPTA 200-62.5-25 mcg blister with device powder for inhalation  Inhale 1 puff daily.  Dose: 1 puff  Generic drug: fluticasone-umeclidinium-vilanterol                INSTRUCTIONS AND FOLLOW-UP         Scheduled Appointments  Encounter Information    This patient does not currently have any appointments scheduled.         Recommended Follow-up Visits      Test Results Pending at Discharge  Pending Inpatient Labs       Order Current Status    Aspergillus Antigen, EIA Serum In process    Fungitell (1-3) B-D GL,Blood In process    Histoplasma Galactomannan AG (UR) In process    [place] Draw Panel In process    Blood Culture Blood, Venous Preliminary result    Blood Culture Blood, Venous Preliminary result    Blood Culture Blood, Venous Preliminary result     Blood Culture Blood, Venous Preliminary result    Sputum Culture (Lab Only) Expectorated Sputum Preliminary result    Sputum culture / smear Expectorated Sputum Preliminary result             LABS AND IMAGING   Pertinent Labs  CHEMISTRIES   Results from last 7 days   Lab Units 06/05/25  0525 06/04/25  0514 06/03/25  0519 06/03/25  0519 06/02/25  0603 06/01/25  1418   SODIUM mEQ/L 134* 134*  --  134* 132* 127*  126*   POTASSIUM mEQ/L 4.3 4.0  --  4.2 3.9 3.9  3.9   CHLORIDE mEQ/L 103 102  --  102 100 94*  94*   CO2 mEQ/L 22 25  --  23 23 19*  22   BUN mg/dL 31* 29*  --  26* 23 33*  33*   CREATININE mg/dL 0.9 1.1  --  0.8 0.9 1.1  1.3   GLUCOSE mg/dL 317* 223*  --  227* 228* 273*  275*   CALCIUM mg/dL 8.5* 8.7  --  8.5* 8.8 9.2  9.2   EGFR mL/min/1.73m*2 >60.0 >60.0  --  >60.0 >60.0 >60.0  55.5*   ANION GAP mEQ/L 9 7  --  9 9 14  10   MAGNESIUM mg/dL 2.0 2.1   < >  --   --   --     < > = values in this interval not displayed.     CBC RESULTS   Results from last 7 days   Lab Units 06/05/25  0525 06/04/25  0514 06/03/25  0519 06/01/25  1418   WBC K/uL 17.93* 21.62* 27.68* 25.23*   HEMOGLOBIN g/dL 9.1* 8.3* 8.7* 10.8*   HEMATOCRIT % 28.4* 26.2* 28.0* 33.2*   PLATELETS K/uL 98* 99* 117* 149*       Pertinent Imaging  X-RAY CHEST 1 VIEW  Result Date: 6/5/2025  IMPRESSION:  Moderately progressive,, extensive bilateral pulmonary opacity of uncertain etiology. Cardiac borders are not discernible. COMPARISON: Chest studies dating from 6/1/2025 2 6/3/2025.. COMMENT:  AP upright portable chest. Progressive dense bilateral pulmonary opacity, more extensive on the left than the right. The cardiac border cannot be discerned from adjacent lung opacity. There is some residual patchy aeration in the right lung, all lung segments at the left lung apex. Air bronchograms are noted. No sizable effusion on the right. A left effusion cannot be excluded but is not discernible due to the pulmonary opacity. Again there is and  mediastinal shift into the right hemithorax hemithorax, noting similar findings on prior studies. This could extend positional change. The upper abdomen is unremarkable.     X-RAY CHEST 1 VIEW  Result Date: 6/3/2025  IMPRESSION: Extensive multifocal interstitial airspace disease bilaterally, slightly progressed in the left upper lobe when compared to the prior examination.     X-RAY CHEST 1 VIEW  Result Date: 6/2/2025  IMPRESSION: Diffuse bilateral airspace opacity, progressed     CT ANGIOGRAPHY CHEST/ABDOMEN/PELVIS WITH AND WITHOUT IV CONTRAST  Result Date: 6/1/2025  IMPRESSION: 1.  No evidence of pulmonary embolus or acute aortic syndrome. 2.  Extensive bilateral groundglass opacity on a background of fibrotic interstitial lung disease which may represent acute exacerbation of chronic ILD or a new infectious, inflammatory, hemorrhagic or edema from congestive heart failure. 3.  Right lower lobe lung consolidation with associated mediastinal and hilar adenopathy which may represent infection or neoplasm. 4.  Multiple liver lesions suggesting metastatic disease. 5.  Multiple other chronic findings. COMMENT: Technique: Computed tomography angiography of the chest, abdomen and pelvis was performed from the thoracic inlet through the pelvis using a dissection protocol without and with utilizing 100 cc Isovue-370. 3D MIP and/or volume rendered image reconstruction performed and reviewed. CT DOSE:  One or more dose reduction techniques (e.g. automated exposure control, adjustment of the mA and/or kV according to patient size, use of iterative reconstruction technique) utilized for this examination. Comparisons studies: Chest x-ray dated the same day. Lung parenchyma: Extensive bilateral patchy groundglass opacity, peripheral reticulation, mild bronchial dilatation and minimal architectural distortion. There is right lower lobe consolidation Thyroid:  Normal. Mediastinum: Mildly prominent lymph nodes the largest a  subcarinal node measuring up to 2.6 cm. Dariana: Mildly prominent bilateral lymph nodes. Heart and pericardium: Severe coronary calcification.  Cardiomegaly. Pulmonary artery: Normal Aorta: Normal Chest wall and pleura: Normal. Axilla: Normal. Liver: Extensive liver lesions the largest measures up to 4.3 cm. Spleen: Normal. Adrenals: Normal. Pancreas: Normal. Kidneys, ureters and bladder: Normal. Gallbladder: Removed Retroperitoneal structures: Normal. Bowel and mesentery: No inflammatory or obstructive process.  Colonic diverticuli. Vessels: Aortoiliac atherosclerotic disease.  No evidence of acute aortic syndrome. Lymph nodes: None enlarged. Pelvic structures:  Normal. Bones: Degenerative change.     X-RAY CHEST 1 VIEW  Result Date: 6/1/2025  IMPRESSION: Please see comment. Recommend follow-up radiographs or chest CT in 6-8 weeks.     Cardiac Imaging    TRANSTHORACIC ECHO (TTE) COMPLETE 06/03/2025    Interpretation Summary    Left Ventricle: Normal ventricle size. Normal wall thickness. Preserved systolic function. Estimated EF 55%. There is a ventricular septal bounce that may represent  RV pressure overload Indeterminate diastolic filling pattern.    Right Ventricle: Moderately dilated ventricle size. Moderately reduced systolic function. Global hypokinesis.    Left Atrium: Mildly dilated atrium.    Right Atrium: Atrium not well visualized.    Aortic Valve: Valve not well seen but likely trileaflet.  Moderate diffuse calcification present with reduced excursion. No regurgitation. Mild stenosis. Peak velocity = 1.84 m/s. Mean gradient = 7.00 mmHg. Calculated area by cont eq = 1.81 cm2. Calculated dimensionless index = 0.47.    Mitral Valve: Mild bileaflet thickening. No regurgitation.    Tricuspid Valve: Structure is grossly normal. Mild regurgitation.  PA pressure estimated at 48 to 50 mmHg.    Pulmonic Valve: Valve not well visualized. Grossly normal structure.    Aorta: Aortic root normal. Sinuses of Valsalva  normal-sized. Ascending aorta normal-sized.    Pericardium: No evidence of pericardial effusion.    There is no previous echocardiogram available for comparison.       OTHER SERVICES PROVIDED   Consults During Admission  IP CONSULT TO CARDIOLOGY  IP CONSULT TO HEMATOLOGY/ONCOLOGY  IP CONSULT TO PAIN/PALLIATIVE CARE  IP CONSULT TO INTENSIVIST  IP CONSULT TO INFECTIOUS DISEASE           Thank you for allowing the Division of Hospital Medicine to care for your patient.        >30 mins spent for coordination/counselling related to discharge plan, including final examination of patient, discussion regarding discharge instructions, reconciliation/prescription of medications, preparation of discharge records, etc.

## 2025-06-05 NOTE — PROGRESS NOTES
"   Pulmonary Progress Note       SUBJECTIVE   Interval History:  -Worsening hypoxia, now max HHFNC  -Removing O2, will drop to SPO2 60%  -Requesting BiPAP but remains intolerant     OBJECTIVE        Vital Signs:   Visit Vitals  BP (!) 96/54   Pulse (!) 108   Temp 36.2 °C (97.1 °F) (Axillary)   Resp (!) 24   Ht 1.854 m (6' 1\")   Wt 99.8 kg (220 lb)   SpO2 92%   BMI 29.03 kg/m²       I/O's:    Intake/Output Summary (Last 24 hours) at 6/5/2025 0838  Last data filed at 6/5/2025 0600  Gross per 24 hour   Intake 846.17 ml   Output 1140 ml   Net -293.83 ml       Medications:    Reviewed. Pertinent medications as below.     acetylcysteine  200 mg nebulization BID (8a, 8p)    azithromycin  500 mg intravenous q24h INT    cefTRIAXone  1 g intravenous q24h INT    cyanocobalamin  1,000 mcg oral Daily    docusate sodium  100 mg oral BID    famotidine  20 mg oral Nightly    folic acid  1 mg oral Daily    gabapentin  300 mg oral Nightly    guaiFENesin  600 mg oral BID    insulin glargine U-100  12 Units subcutaneous Nightly    insulin lispro U-100  2-10 Units subcutaneous TID with meals    ipratropium-albuteroL  3 mL nebulization q6h RUTHY    lisinopriL  10 mg oral Daily    methylPREDNISolone sodium succinate  40 mg intravenous q12h INT    metoprolol tartrate  12.5 mg oral BID    mometasone-formoterol  2 puff inhalation BID (8a, 8p)    And    tiotropium bromide  2 puff inhalation Daily (8a)    pantoprazole  40 mg intravenous q24h    pravastatin  20 mg oral Daily    sodium chloride  1 g oral TID with meals    thiamine  100 mg oral Daily    vancomycin  1,500 mg intravenous q12h INT       Anti-infectives (From admission, onward)      Start     Dose/Rate Route Frequency Ordered Stop    06/05/25 0930  vancomycin 1.5 g/500 mL IVPB in NSS         1,500 mg  250 mL/hr over 120 Minutes intravenous Every 12 hours interval 06/04/25 2201 06/03/25 0737  vancomycin Therapy by Pharmacy Protocol 1 each         1 each Other evaluate daily " "06/03/25 0737      06/02/25 1515  azithromycin (ZITHROMAX) IVPB 500 mg in 250 mL NSS vial in bag         500 mg  250 mL/hr over 60 Minutes intravenous Every 24 hours interval 06/01/25 1752      06/02/25 1515  cefTRIAXone (ROCEPHIN) IVPB 1 g in 100 mL NSS vial in bag         1 g  200 mL/hr over 30 Minutes intravenous Every 24 hours interval 06/01/25 1752              PHYSICAL EXAMINATION        Vital Signs:   Visit Vitals  BP (!) 96/54   Pulse (!) 108   Temp 36.2 °C (97.1 °F) (Axillary)   Resp (!) 24   Ht 1.854 m (6' 1\")   Wt 99.8 kg (220 lb)   SpO2 92%   BMI 29.03 kg/m²       I/O's:    Intake/Output Summary (Last 24 hours) at 6/5/2025 0838  Last data filed at 6/5/2025 0600  Gross per 24 hour   Intake 846.17 ml   Output 1140 ml   Net -293.83 ml       General: The patient is in no acute distress.  Resting comfortably in bed.  HEENT: Mucous membranes are moist.  Sclera are anicteric.  Neck: Supple.  No cervical lymphadenopathy  Cardiovascular: S1 and S2 are present.  There are no murmurs.  Lungs: Bilateral crackles. Poor air movement with bronchial breath sounds.   Abdomen: Soft, nontender and nondistended  Extremities: No edema  Neuro: Awake and alert.  Spontaneously moving all extremities       Diagnostic Data      Labs:    I have personally reviewed all pertinent patient laboratory results. Labs of note discussed below:    ABG Results         06/01/25 06/01/25 2050 1617    pH -- 7.43    pCO2 -- 29    pO2 -- 49    HCO3 -- 21.3    Base Excess -- -4.3    Source Of Oxygen nasal cannula 2L NC          Results from last 7 days   Lab Units 06/05/25  0525 06/04/25  0514 06/03/25  0519   CREATININE mg/dL 0.9 1.1 0.8   BUN mg/dL 31* 29* 26*   WBC K/uL 17.93* 21.62* 27.68*   HEMOGLOBIN g/dL 9.1* 8.3* 8.7*   PLATELETS K/uL 98* 99* 117*       Micro:   Microbiology Results       Procedure Component Value Units Date/Time    Blood Culture Blood, Venous [[id number]]  (Abnormal) Collected: 06/01/25 1511    Specimen: Blood, Venous " Updated: 06/02/25 1651     Culture **Positive Culture**     Gram Stain Result Gram positive cocci in clusters    Blood Culture Blood, Venous [[id number]]  (Abnormal) Collected: 06/01/25 1448    Specimen: Blood, Venous Updated: 06/02/25 1448     Culture **Positive Culture**     Gram Stain Result Gram positive cocci in pairs and clusters    Blood Culture PCR Panel Blood, Venous [[id number]]  (Abnormal) Collected: 06/01/25 1448    Specimen: Blood, Venous Updated: 06/02/25 1447     Candida albicans Not Detected     Candida auris Not Detected     Candida glabrata Not Detected     Candida krusei Not Detected     Candida parapsilosis Not Detected     Cryptococcus neoformans/gattii Not Detected     Enterococcus faecalis Not Detected     Enterococcus faecium Not Detected     Enterobacterales Not Detected     Enterobacter cloacae complex Not Detected     Escherichia coli Not Detected     Klebsiella oxytoca Not Detected     Klebsiella pneumoniae Not Detected     Klebsiella aerogenes Not Detected     Proteus Not Detected     Salmonella species Not Detected     Serratia marcescens Not Detected     Haemophilus influenzae Not Detected     Listeria monocytogenes Not Detected     Neisseria meningitidis Not Detected     Pseudomonas aeruginosa Not Detected     Stenotrophomonas maltophilia Not Detected     Bacteroides fragilis Not Detected     Staphylococcus aureus Not Detected     Staphylococcus ludgnensis Not Detected     Staphylococcus epidermidis Detected     Streptococcus Not Detected     Streptococcus agalactiae (Group B) Not Detected     Streptococcus pneumoniae Not Detected     Streptococcus pyogenes (Group A) Not Detected     KPC (Carbapenem Resistance Gene) Not Applicable     mecA/C Detected     mecA/C and MREJ (MRSA) Not Applicable     Jasmin/B (Vancomycin Resistance Gene) Not Applicable     CTX-M (ESBL) Not Applicable     IMP Not Applicable     mcr-1 Not Applicable     NDM Not Applicable     OXA-48-like Not Applicable     VIM  Not Applicable     Comment: --    SARS-COV-2 (COVID-19)/ FLU A/B, AND RSV, PCR Nasopharynx [[id number]]  (Normal) Collected: 06/01/25 1419    Specimen: Nasopharyngeal Swab from Nasopharynx Updated: 06/01/25 1502     SARS-CoV-2 (COVID-19) Negative     Influenza A Negative     Influenza B Negative     Respiratory Syncytial Virus Negative    Narrative:      Testing performed using real-time PCR for detection of COVID-19. EUA approved validation studies performed on site.             Imaging:    Personally reviewed:  Radiology Images  Recent Results (from the past 6 weeks)   X-RAY CHEST 1 VIEW    Collection Time: 06/01/25  2:44 PM    Narrative    CLINICAL HISTORY: sob    COMMENT: Frontal view of the chest obtained.    COMPARISON: None available.    LINES/TUBES: None.    OTHER: Mild pulmonary edema with trace bilateral pleural effusions suggestive of  mild CHF. Superimposed patchy airspace opacities, most significant in the lower  lobes, suspicious for superimposed multifocal pneumonia. No definite  pneumothorax or large pleural effusion. Cardiomediastinal silhouette mostly  obscured and not well evaluated.      Impression    IMPRESSION: Please see comment. Recommend follow-up radiographs or chest CT in  6-8 weeks.       CT ANGIOGRAPHY CHEST/ABDOMEN/PELVIS WITH AND WITHOUT IV CONTRAST    Collection Time: 06/01/25  4:56 PM    Narrative    CLINICAL HISTORY: Shortness of breath      Impression    IMPRESSION:  1.  No evidence of pulmonary embolus or acute aortic syndrome.  2.  Extensive bilateral groundglass opacity on a background of fibrotic  interstitial lung disease which may represent acute exacerbation of chronic ILD  or a new infectious, inflammatory, hemorrhagic or edema from congestive heart  failure.  3.  Right lower lobe lung consolidation with associated mediastinal and hilar  adenopathy which may represent infection or neoplasm.  4.  Multiple liver lesions suggesting metastatic disease.  5.  Multiple other  chronic findings.      COMMENT:  Technique: Computed tomography angiography of the chest, abdomen and pelvis was  performed from the thoracic inlet through the pelvis using a dissection protocol  without and with utilizing 100 cc Isovue-370.    3D MIP and/or volume rendered image reconstruction performed and reviewed.    CT DOSE:  One or more dose reduction techniques (e.g. automated exposure  control, adjustment of the mA and/or kV according to patient size, use of  iterative reconstruction technique) utilized for this examination.    Comparisons studies: Chest x-ray dated the same day.    Lung parenchyma: Extensive bilateral patchy groundglass opacity, peripheral  reticulation, mild bronchial dilatation and minimal architectural distortion.  There is right lower lobe consolidation    Thyroid:  Normal.    Mediastinum: Mildly prominent lymph nodes the largest a subcarinal node  measuring up to 2.6 cm.    Dariana: Mildly prominent bilateral lymph nodes.    Heart and pericardium: Severe coronary calcification.  Cardiomegaly.    Pulmonary artery: Normal    Aorta: Normal    Chest wall and pleura: Normal.    Axilla: Normal.    Liver: Extensive liver lesions the largest measures up to 4.3 cm.    Spleen: Normal.    Adrenals: Normal.    Pancreas: Normal.    Kidneys, ureters and bladder: Normal.    Gallbladder: Removed    Retroperitoneal structures: Normal.    Bowel and mesentery: No inflammatory or obstructive process.  Colonic  diverticuli.    Vessels: Aortoiliac atherosclerotic disease.  No evidence of acute aortic  syndrome.    Lymph nodes: None enlarged.    Pelvic structures:  Normal.    Bones: Degenerative change.         X-RAY CHEST 1 VIEW    Collection Time: 06/02/25  2:24 AM    Narrative    CLINICAL HISTORY:   Increased oxygen requirement    COMMENT:    PROCEDURE: Single frontal view of the chest.    COMPARISON:   Prior chest x-ray and chest CT dated June 1, 2025    Support lines, tubes, devices, and surgical hardware,  material: Monitoring  leads/wires overlie the patient.    Lungs and Pleura: Diffuse bilateral hazy airspace opacity and patchy  consolidation which has progressed. Possible small right pleural effusion. No  pneumothorax.    Cardiovascular and Mediastinum: The cardiomediastinal silhouette is stable.  Cardiomegaly.    Other: No acute osseous abnormality.      Impression    IMPRESSION:  Diffuse bilateral airspace opacity, progressed             Transthoracic echo (TTE) complete    Collection Time: 06/03/25 10:21 AM   Result Value    BSA 2.27    EMA A2C 7.96    EMA A4C 7.18    LAAs A2C 34.20    LAAs A4C 30.70    Left Atrium Systolic Volume 120.00    LA volume 108.00    Left Atrium End Systolic Volume (BP method) 119.00    LAD 2D 5.60    LA/Ao Ratio 1.75    AV Mean Gradient 7.00    AV VTI 31.80    AV Mean Velocity 1.25    AV Peak Velocity-S 1.84    AV Peak Gradient 14.00    AV Area 1.41    Ao Root Annulus 3.20    Ascend Ao 3.20    IVS 1.11    LVIDd 5.24    LVIDs 3.95    LVOT Diameter 2D 2.20    LVOT Mean Gradient 2.00    LVOT VTI 15.10    LVOT Mean Velocity  0.63    LVOT Peak Velocity 0.87    LVOT Peak Gradient 3.00    PW 1.27    LVPWd 1.27    FS 24.62    LVOT Cross-section Area 3.80    LVOT Stroke Volume 57.37    E wave deceleration time 188.00    MV Stenosis P1/2t 55.00    MV Peak E-Wave 0.98    MVA P1/2  4.00    S' lat velocity 0.11    TAPSE 1.23    TR Peak Velocity 2.60    TR Peak Gradient 27.04    LVOT Stroke Volume Index 25.27    LA Volume Index 47.58    Left Atrial End Systolic Volume Index 52.86    LA ESV INDEX (BP) 52.42    AV Velocity Ratio 0.47    Aortic Valve Area by Continuity of VTI 1.81    Valve area - Index 0.80    Valve area - Index 0.6    ZLVPWD 0.66    ZLVIDS -1.63    ZLVIDD -4.25    Narrative      Left Ventricle: Normal ventricle size. Normal wall thickness. Preserved   systolic function. Estimated EF 55%. There is a ventricular septal bounce   that may represent  RV pressure overload Indeterminate  diastolic filling   pattern.    Right Ventricle: Moderately dilated ventricle size. Moderately reduced   systolic function. Global hypokinesis.    Left Atrium: Mildly dilated atrium.    Right Atrium: Atrium not well visualized.    Aortic Valve: Valve not well seen but likely trileaflet.  Moderate   diffuse calcification present with reduced excursion. No regurgitation.   Mild stenosis. Peak velocity = 1.84 m/s. Mean gradient = 7.00 mmHg.   Calculated area by cont eq = 1.81 cm2. Calculated dimensionless index =   0.47.    Mitral Valve: Mild bileaflet thickening. No regurgitation.    Tricuspid Valve: Structure is grossly normal. Mild regurgitation.  PA   pressure estimated at 48 to 50 mmHg.    Pulmonic Valve: Valve not well visualized. Grossly normal structure.    Aorta: Aortic root normal. Sinuses of Valsalva normal-sized. Ascending   aorta normal-sized.    Pericardium: No evidence of pericardial effusion.    There is no previous echocardiogram available for comparison.     X-RAY CHEST 1 VIEW    Collection Time: 06/03/25  5:18 PM    Narrative    CLINICAL HISTORY:  Dyspnea    COMMENT:  A portable erect AP view of the chest was obtained 6/3/2025 5:18 PM    COMPARISON:  Chest radiograph from 6/2/2025    Again noted is extensive multifocal interstitial airspace disease, similar to  slightly progressed in the left upper lobe when compared to the prior  examination.  No large effusion or pneumothorax.      The cardiomediastinal  contours are stable.      Impression    IMPRESSION:  Extensive multifocal interstitial airspace disease bilaterally, slightly  progressed in the left upper lobe when compared to the prior examination.               ASSESSMENT AND PLAN      # Acute on chronic hypoxic respiratory failure  - Baseline 2 L/min, increased to 15 L/min on admission, worsening, now on HHFNC 100%/60LPM  - Continue to wean oxygen, with goal SpO2 89-92%  - VBG without hypercapnia  - worsening CXR 6/3  - repeat CXR     #  Multifocal pneumonia, community-acquired  - Given hypoxia, leukocytosis, infiltrates, recommend treating for pneumonia  - Started on ceftriaxone-azithromycin.  Will treat for 5-3 days respectively  - Sputum culture if able to expectorate  - Trend temperature, white blood cell count. WBC improving.   - ARDS?   - checking for fungal and viral etiologies    # MRSE bacteremia, sepsis  - on Vanc  - ID following     #Acute on chronic systolic and diastolic congestive heart failure  - BNP 1000 on admission with signs of hypervolemia (CT with bilateral groundglass and hyponatremia)  - Monitor renal function, I's and O's, blood pressure  - Echocardiogram with RV failure with only mild PHTN  - Cards following  - Consider empiric diuresing given worsening respiratory status. Defer to Cards.   - I<O 6/4. Monitor. No edema     #Elevated troponin, possible NSTEMI  - Heparin infusion     #Combined Pulmonary Fibrosis and Emphysema (CPFE)  - Continue triple therapy inhaler, with albuterol as needed  - Target SpO2 89-92%  - Continue Solumedrol 40mg IV q12.   - h/o XRT to [facility] for palliative empiric treatment of lung cancer    #Lung cancer  - Concern for metastatic disease  - Oncology following  - Planning for IR liver biopsy when stable    #Encephalopathy  - Increasing agitation and intolerance to HFNC and BiPAP  - I have ordered and will manage Precedex     30 minutes critical care time spent in the direct care of this patient, review of data, imaging, examination, formulation of a management plan, and coordination of care with the team, consultants, nursing and respiratory therapy.          Raul Ward MD  Pulmonary & Critical Care Medicine   6/5/2025  8:38 AM

## 2025-06-05 NOTE — PROGRESS NOTES
Palliative Care Progress Note    This is Hospital Day: 5    Conversation/Goals of Care:  17:20:  Multiple GOC discussions through out the day.  Current goal is NO ESCALATION in care, add comfort medications (morphine and Ativan ) , and to continue the rest of his medications, monitoring.  Family is aware of hypotension and that he is at high risk of dying in through the evening/night.      Assessment & Plan  Debility  - Debility likely multifactorial in the setting of HF/COPD/PF, PNA, new concern for metastatic disease.    - Living situation prior to hospitalization [place], visiting for [employer] graduation.   - Baseline functional status is independent, recently more SOB and weak.   - Current Palliative Performance Status: 30%  - Baseline Palliative Performance Status: 80%   - Patient remains high risk for further deterioration and functional decline.      6/5:  increased wob, +/- delirium, metabolic encephalopathy.  GOC reviewed with family , pt has not improved through the day.      Plan:  Goals are NO escalation of care, no pressors, symptom medications, but to continue the rest of his medical therapies.  Family is aware he could die during evening/night.Clinical status/and discussions  Discussed with Dr. Tinoco through out the day     Palliative care by specialist  79 y.o. with a PMH of HF, COPD/PF admitted for sepsis w/ ADHF in s/o multifocal PNA, imaging with multiple liver lesions and concern for metastatic dx    - Code status: Do Not Resuscitate / Allow Natural Death. Discussed today.  - Prognosis: guarded, pending oncology consult and work-up  - Advance Directives: yes, not on file  - Surrogate Decision Maker: designates his son Kendrick  - Capacity intact   - Support: 2 sons, DIL grant, grandchildren  Liver mass    SOB (shortness of breath)  In setting Emphysema, lung cancer, chf   Currently with increase wob , rr 32-36, he is restless, some confusions, on HHFC and NRB, currently being treated for  multifocal pneumonia, CHF,, with worsening serial CXRs.      On antiboitics, diuretics, oxygen, steroids, inhalers.     Plan  Morphine 2mg q 2hr prn dyspnea  Ativan 1mg q 4h prn anxiety, agitation, refractory dyspnea      Interval History (Subjective)    Source: chart review, the patient, and family, clinical RN,     F/u in setting of worsening oxygen requirements and hypoxia, on HHF and NRB , does not tolerate Bipap after being put on.  Started on Precidex  +restless, screaming   +sob  Denies pain   CXR reviewed worsening     Current Medications:  Current Facility-Administered Medications   Medication Dose Route Frequency Provider Last Rate Last Admin    acetaminophen (TYLENOL) tablet 650 mg  650 mg oral q6h PRN Wandy Walden MD   650 mg at 06/04/25 1004    acetylcysteine (MUCOMYST) 200 mg/mL (20 %) solution 200 mg  200 mg nebulization BID (8a, 8p) Raul Ward MD   200 mg at 06/05/25 0752    alum-mag hydroxide-simeth (MAALOX) 200-200-20 mg/5 mL suspension 30 mL  30 mL oral q4h PRN Wandy Walden MD        azithromycin (ZITHROMAX) IVPB 500 mg in 250 mL NSS vial in bag  500 mg intravenous q24h INT Wandy Walden MD   Stopped at 06/04/25 1705    benzonatate (TESSALON) capsule 100 mg  100 mg oral 3x daily PRN Wandy Walden MD   100 mg at 06/03/25 1942    cefTRIAXone (ROCEPHIN) IVPB 1 g in 100 mL NSS vial in bag  1 g intravenous q24h INT Wandy Walden MD   Stopped at 06/04/25 1503    cyanocobalamin (VITAMIN B12) tablet 1,000 mcg  1,000 mcg oral Daily Sincere Rankin MD   1,000 mcg at 06/05/25 0908    dexmedeTOMIDine in 0.9 % NaCL (PRECEDEX) 400 mcg/100 mL (4 mcg/mL) infusion  0-1 mcg/kg/hr intravenous Titrated Raul Ward MD 15 mL/hr at 06/05/25 1038 0.6 mcg/kg/hr at 06/05/25 1038    glucose chewable tablet 15-30 g of dextrose  15-30 g of dextrose oral PRN Wandy Walden MD        Or    dextrose 40 % oral gel 15-30 g of dextrose  15-30 g of dextrose oral PRN Wandy Walden MD         Or    glucagon (GLUCAGEN) injection 1 mg  1 mg intramuscular PRN Wandy Walden MD        Or    dextrose 50 % in water (D50) injection 12.5 g  25 mL intravenous PRN Wandy Walden MD        diazePAM (VALIUM) injection 2.5 mg  2.5 mg intravenous q2h PRN Wandy Walden MD   2.5 mg at 06/05/25 0044    Or    diazePAM (VALIUM) injection 2 mg  2 mg intravenous q1h PRN Wandy Walden MD        Or    diazePAM (VALIUM) injection 5 mg  5 mg intravenous q30 min PRN Wandy Walden MD        diphenhydrAMINE (BENADRYL) capsule 25 mg  25 mg oral q6h PRN Wandy Walden MD        docusate sodium (COLACE) capsule 100 mg  100 mg oral BID Radha Pratt DO   100 mg at 06/05/25 0908    famotidine (PEPCID) tablet 20 mg  20 mg oral Nightly Sincere Rankin MD   20 mg at 06/04/25 2139    folic acid (FOLVITE) tablet 1 mg  1 mg oral Daily Wandy Walden MD   1 mg at 06/05/25 0908    furosemide (LASIX) injection 20 mg  20 mg intravenous BID (am, 4p) Miracle Garcia PA C        gabapentin (NEURONTIN) capsule 300 mg  300 mg oral Nightly Sincere Rankin MD   300 mg at 06/04/25 2139    guaiFENesin (MUCINEX) 12 hr ER tablet 600 mg  600 mg oral BID Sincere Rankin MD   600 mg at 06/05/25 0909    heparin (porcine) bolus from bag 3,500-7,050 Units  40-80 Units/kg (Adjusted) intravenous q6h PRN Chele Castellon MD   3,500 Units at 06/04/25 1317    heparin infusion in D5W 100 units/mL  100-4,000 Units/hr intravenous Titrated Chele Castellon MD 16.5 mL/hr at 06/05/25 0903 1,650 Units/hr at 06/05/25 0903    insulin glargine U-100 (LANTUS/BASAGLAR) pen 12 Units  12 Units subcutaneous Nightly Sincere Rankin MD   12 Units at 06/04/25 2146    insulin lispro U-100 (HumaLOG) pen 2-10 Units  2-10 Units subcutaneous TID with meals Wandy Walden MD   8 Units at 06/05/25 0914    ipratropium-albuteroL (DUO-NEB) 0.5-2.5 mg/3 mL nebulizer solution 3 mL  3 mL nebulization q6h [facility] Wandy Walden MD   3 mL at 06/05/25 0758     lisinopriL (PRINIVIL) tablet 10 mg  10 mg oral Daily Sincree Rankin MD   10 mg at 06/05/25 0909    methylPREDNISolone sod suc(PF) (SOLU-Medrol) injection 40 mg  40 mg intravenous q12h INT Raul Ward MD   40 mg at 06/04/25 2222    metoprolol tartrate (LOPRESSOR) tablet 12.5 mg  12.5 mg oral BID Sincere Rankin MD   12.5 mg at 06/05/25 0909    mometasone-formoterol (DULERA 200) 200-5 mcg/actuation inhaler 2 puff  2 puff inhalation BID (8a, 8p) Sincere Rankin MD   2 puff at 06/05/25 0754    And    tiotropium bromide (SPIRIVA RESPIMAT) 2.5 mcg/actuation inhaler 2 puff  2 puff inhalation Daily (8a) Sinecre Rankin MD   2 puff at 06/05/25 0754    pantoprazole (PROTONIX) injection 40 mg  40 mg intravenous q24h Wandy Walden MD   40 mg at 06/05/25 0913    polyethylene glycol (MIRALAX) 17 gram packet 17 g  17 g oral Daily PRN Radha Pratt DO   17 g at 06/04/25 1742    pravastatin (PRAVACHOL) tablet 20 mg  20 mg oral Daily Sincere Rankin MD   20 mg at 06/05/25 0908    sodium chloride tablet 1 g  1 g oral TID with meals Wandy Walden MD   1 g at 06/05/25 0908    thiamine (VITAMIN B1) tablet 100 mg  100 mg oral Daily Wandy Walden MD   100 mg at 06/05/25 0908    traMADoL (ULTRAM) tablet 50 mg  50 mg oral q6h PRN Sincere Rankin MD   50 mg at 06/02/25 1042    vancomycin 1.5 g/500 mL IVPB in NSS  1,500 mg intravenous q12h INT Jovanni Zuniga  mL/hr at 06/05/25 0920 1,500 mg at 06/05/25 0920    vancomycin Therapy by Pharmacy Protocol 1 each  1 each Other evaluate daily Jovanni Zuniga MD           Objective    Physical Exam:  Physical Exam  Vitals and nursing note reviewed.   Constitutional:       General: He is in acute distress.      Appearance: He is ill-appearing.   Eyes:      General: No scleral icterus.  Pulmonary:      Effort: Respiratory distress present.      Breath sounds: No wheezing or rhonchi.      Comments: Rr 36,   Psychiatric:      Comments: Restless, agitation, trying  to get oob,          Vitals:  Vitals:    06/05/25 0800   BP: 129/70   Pulse: (!) 101   Resp:    Temp: 36.2 °C (97.1 °F)   SpO2: (!) 84%       Laboratory Studies:  Independent review of labs: Na 134, cr 0.9, wbc 17.93, h/h 9.1/28.4, plt 98       Imaging and Other Studies:     I have independently reviewed the pertinent imaging to the time of note and agree with reported results.(Serial cxrs over last 3 days)         Erica Daniels, [place]  Office [phone]

## 2025-06-05 NOTE — ASSESSMENT & PLAN NOTE
- Debility likely multifactorial in the setting of HF/COPD/PF, PNA, new concern for metastatic disease.    - Living situation prior to hospitalization [place] visiting for [employer] graduation.   - Baseline functional status is independent, recently more SOB and weak.   - Current Palliative Performance Status: 30%  - Baseline Palliative Performance Status: 80%   - Patient remains high risk for further deterioration and functional decline.      6/5:  increased wob, +/- delirium, metabolic encephalopathy.  GOC reviewed with family , pt has not improved through the day.      Plan:  Goals are NO escalation of care, no pressors, symptom medications, but to continue the rest of his medical therapies.  Family is aware he could die during evening/night.Clinical status/and discussions  Discussed with Dr. Tinoco through out the day

## 2025-06-05 NOTE — PROGRESS NOTES
Patient: Juaquin Linda  Location: [facility]  MRN:  [id number]  Today's date:  6/5/2025    Attempted to see patient for therapy. Unable due to medical hold (pt with worsening hypoxia maxed on HHFNC. Not appropriate for therapy eval at this time. Will continue to follow.).

## 2025-06-05 NOTE — PROGRESS NOTES
SUBJECTIVE      79 year old male with history of hypertension, presumed coronary artery disease with an abnormal stress test from November 2024, heart failure with moderately reduced EF, moderate mitral regurgitation and severe pulmonary hypertension, persistent atrial fibrillation for which he has been maintained on Eliquis, COPD for which he was to be on O2, and exertional dyspnea who presented to  on 6/1/25 with progressive shortness of breath. He was hypotensive and hypoxic on arrival. WBC elevated, BNP 1100, troponin 351/ 256. CTA chest showed extensive bilateral groundglass opacities on the background of fibrotic interstitial lung disease with right lower lobe consolidation with associated mediastinal and hilar adenopathy. There were also multiple liver lesions concerning for metastatic disease.    No events overnight. Patient with worsening dyspnea, remains on 60L HFNC, intermittent bipap use. No chest pain, palpitations, dizziness, edema.    OBJECTIVE     VITAL SIGNS:  Temp:  [36.2 °C (97.1 °F)-36.7 °C (98.1 °F)] 36.2 °C (97.1 °F)  Heart Rate:  [] 101  Resp:  [24-30] 24  BP: ()/(54-95) 129/70  FiO2 (%) (Set):  [60 %-100 %] 100 %  SPO2 (!) 84%    Intake/Output Summary (Last 24 hours) at 6/5/2025 0929  Last data filed at 6/5/2025 0920  Gross per 24 hour   Intake 1346.17 ml   Output 1140 ml   Net 206.17 ml       PHYSICAL EXAM:  General appearance: alert and cooperative; on NRB  Head: without obvious abnormality  Eyes: PERRLA, extraocular movements intact  Neck: No JVD, carotid bruits, thyromegaly  Lungs: clear, no crackles or wheezing  Heart: irregular rhythm, normal rate, S1-S2 normal, no murmurs, clicks, rubs or gallops  Abdomen: soft, non-tender, bowel sounds normal  Extremities: no edema, peripheral pulses present  Skin: Skin color, texture, turgor normal. No rashes or lesions  Neurologic: Alert and oriented X 3, no focal deficits    LABS / IMAGING / EKG / TELEMETRY     LABS:  Results  "from last 7 days   Lab Units 06/05/25  0525 06/04/25  0514 06/03/25  0519 06/02/25  0603 06/01/25  1418   SODIUM mEQ/L 134* 134* 134* 132* 127*  126*   POTASSIUM mEQ/L 4.3 4.0 4.2 3.9 3.9  3.9   MAGNESIUM mg/dL 2.0 2.1  --   --   --    CHLORIDE mEQ/L 103 102 102 100 94*  94*   CO2 mEQ/L 22 25 23 23 19*  22   BUN mg/dL 31* 29* 26* 23 33*  33*   CREATININE mg/dL 0.9 1.1 0.8 0.9 1.1  1.3   AST IU/L  --   --   --  53* 75*   ALT IU/L  --   --   --  49 70*     Results from last 7 days   Lab Units 06/05/25  0525 06/04/25  0514 06/03/25  0519 06/01/25  1626 06/01/25  1418   WBC K/uL 17.93* 21.62* 27.68*  --  25.23*   HEMOGLOBIN g/dL 9.1* 8.3* 8.7*  --  10.8*   HEMATOCRIT % 28.4* 26.2* 28.0*  --  33.2*   PLATELETS K/uL 98* 99* 117*  --  149*   INR   --   --   --  1.6 1.5     No results found for: \"HGBA1C\", \"TSH\"  No results found for: \"CHOL\", \"LDLCALC\", \"HDL\", \"TRIG\"  Lab Results   Component Value Date    BNP 1,103 (H) 06/01/2025       IMAGING:  CXR 6/2/25: Diffuse bilateral airspace opacity, progressed     Echo 6/3/25:    LV: Estimated EF 55% with ventricular septal bounce that may represent RV pressure overload indeterminate diastolic filling pattern.    RV: Moderately dilated ventricle size. Moderately reduced systolic function. Global hypokinesis.    Left Atrium: Mildly dilated atrium.    Right Atrium: Atrium not well visualized.    Aortic Valve: Valve not well seen but likely trileaflet. Moderate diffuse calcification present with reduced excursion. No regurgitation. Mild stenosis. Peak velocity = 1.84 m/s. Mean gradient = 7.00 mmHg. Calculated area by cont eq = 1.81 cm2. Calculated dimensionless index = 0.47.    Mitral Valve: Mild bileaflet thickening. No regurgitation.    Tricuspid Valve: Structure is grossly normal. Mild regurgitation.  PA pressure estimated at 48 to 50 mmHg.    TELEMETRY: afib, rate controlled, no events      MEDICATIONS         acetylcysteine  200 mg nebulization BID (8a, 8p)    azithromycin  " 500 mg intravenous q24h INT    cefTRIAXone  1 g intravenous q24h INT    cyanocobalamin  1,000 mcg oral Daily    docusate sodium  100 mg oral BID    famotidine  20 mg oral Nightly    folic acid  1 mg oral Daily    gabapentin  300 mg oral Nightly    guaiFENesin  600 mg oral BID    insulin glargine U-100  12 Units subcutaneous Nightly    insulin lispro U-100  2-10 Units subcutaneous TID with meals    ipratropium-albuteroL  3 mL nebulization q6h RUTHY    lisinopriL  10 mg oral Daily    methylPREDNISolone sodium succinate  40 mg intravenous q12h INT    metoprolol tartrate  12.5 mg oral BID    mometasone-formoterol  2 puff inhalation BID (8a, 8p)    And    tiotropium bromide  2 puff inhalation Daily (8a)    pantoprazole  40 mg intravenous q24h    pravastatin  20 mg oral Daily    sodium chloride  1 g oral TID with meals    thiamine  100 mg oral Daily    vancomycin  1,500 mg intravenous q12h INT       ASSESSMENT AND PLAN     Unfortunate 79 year-old  man who presented to  with dyspnea/hypoxemia, hypotension and hyponatremia/lactic acidosis.  He was found to have evidence of metastatic disease of his liver with recent 30 lb weight loss.       Dyspnea/hypoxemia/sepsis - this is multifactorial and related to his underlying lung disease (evidence of ILD by CT scan), pneumonia and possible metastatic disease. EF preserved. I do not believe that his dyspnea is primarily cardiac. Euvolemic. No need for diuretics.  He is NOT on standing dose LOOP diuretic as outpatient. Pulmonary following. Patient with intermittent bipap use, starting precedex.  Elevated troponin - he has evidence of inferior infarct with mild ada-infarct ischemia by stress testing from 11/2024. Severe coronary calcification on CTA. Suspect his underlying illness precipitated some degree of demand ischemia.  Troponins are trending downward. No chest pain. Improved LV function and underlying bacteremia/pneumonia/metastatic disease ppt demand ischemia.  Continue statin, aspirin, metoprolol.  Permanent afib - patient reports discontinuing eliquis 10 years ago after his brother passed away from an aneurysm. He was on it briefly last year and then stopped. He understands the risk of CVA, death, disability without anticoagulation in the setting of permanent afib. He is on IV heparin for now. Monitor hgb and platelets. Continue metoprolol for rate control.  HFpEF - TTE shows preserved EF with moderate pulmonary hypertension and evidence for RV dilatation/dysfunction - presumably related to his underlying lung disease. Continue GDMT.  Metastatic disease - heme/onc following.     TOMER Sharma  6/5/2025    Primary Care Doctor: Mendez Montaño

## 2025-06-05 NOTE — CONSULTS
Responded to RRT.  Met with daughter in law in the hallway.  Offered support to her.  Provided a Rosary, placed on handrail.  Prayed the Our Father for him.    Offered supportive conversation, empathetic listening, and caring support.    Michelle Quezada MA  Spiritual Care Specialist  [phone]  #[phone]

## 2025-06-05 NOTE — CONSULTS
Responded to referral from Palliative Care, provided end of life prayer and support to patient and family at bedside. Provided linking hearts. Thanks expressed for support and prayer.    Staff updated.    The Spiritual Care team will remain available for spiritual and emotional needs.    Mindy Oppenheimer  Spiritual Care Manager  [phone]  #[phone]

## 2025-06-05 NOTE — PROGRESS NOTES
Vancomycin Dosing by Pharmacy Consult Follow up    Juaquin Linda is a 79 y.o. male who has been consulted for vancomycin dosing for bloodstream infection.    Reviewed relevant clinical data including weight, renal function, previous vancomycin doses, and vancomycin levels  Creatinine   Date/Time Value Ref Range Status   06/04/2025 0514 1.1 0.7 - 1.3 mg/dL Final   06/03/2025 0519 0.8 0.7 - 1.3 mg/dL Final   06/02/2025 0603 0.9 0.7 - 1.3 mg/dL Final     Vancomycin Tr   Date/Time Value Ref Range Status   06/04/2025 2009 13.2 10.0 - 20.0 ug/mL Final         Vancomycin Administrations (last 96 hours)       Date/Time Action Medication Dose Rate    06/04/25 2141 New Bag    vancomycin 1.25 gram/250 mL IVPB in NSS 1,250 mg 166.7 mL/hr    06/04/25 0940 New Bag    vancomycin 1.25 gram/250 mL IVPB in NSS 1,250 mg 166.7 mL/hr    06/03/25 2016 New Bag    vancomycin 1.25 gram/250 mL IVPB in NSS 1,250 mg 166.7 mL/hr    06/03/25 0912 New Bag    vancomycin (VANCOCIN) 2 g/500 mL IVPB in NSS 2,000 mg 250 mL/hr              Assessment/Plan  The patient is ordered vancomycin dosing by pharmacy.      The patient’s renal function; is stable    Vancomycin trough level 13.2 on maintenance dose of 1250 mg IV Q 12 H with a goal trough 15-20 ug/mL.      Will adjust dose to vancomycin 1500 mg IV Q 12 H .    Next trough due on 6/6/25, to be timed by clinical pharmacist.    Pharmacy will continue to follow the patient’s vancomycin dosing daily during this course of therapy.      Please call vancomycin levels to the pharmacy.  Brett Zapata, PharmD

## 2025-06-05 NOTE — PROGRESS NOTES
.     Internal Medicine  Death Pronouncement Note     SUMMARY OF EVENT   This is a 79 y.o. year-old male who was admitted on 6/1/2025 and was being treated for Sepsis due to Streptococcus species without acute organ dysfunction (CMS/[facility]).        PRELIMINARY CAUSE OF DEATH      Preliminary Cause of Death- Due to/Consequence of: Pneumonia, Sepsis    Date of death pronouncement: 06/05/2025    Time of death pronouncement: 05:55 pm      PHYSICAL EXAM   Central and peripheral pulses were not palpable, pupils were fixed and dilated, no heartsounds were auscultated, the patient did not have any native respirations, and the patient was unresponsive to painful, tactile, and verbal stimuli.       NOTIFICATIONS      Family Member(s) Notified (please list name(s)): Son Santi Linda and Son Efrain Linda    Attending of Record or Covering Physician Notified (please list name): Radha Pratt, DO    Medical Examiner/ Case? (if yes, please list the person in the 's Office that was contacted): No   AUTOPSY      Autopsy requested by family member/POA: Yes/No: no    If yes to above, please list name of family member/POA requesting autopsy:     If yes to above, authorized family member/POA has signed Autopsy Permission Form in Death Packet (must be done 24 hours a day):         If yes to above, name of person in STAT Lab ([phone] at [facility]) who was notified by physician (must be done 24 hours a day):     If yes to above, name of nurse notified of autopsy request (must be done 24 hours a day):

## 2025-06-05 NOTE — PROGRESS NOTES
Patient: Juaquin Linda  Location: [facility] 3219  MRN:  [id number]  Today's date:  6/5/2025    Attempted to see patient for therapy. Unable due to medical hold (pt with worsening hypoxia maxed on HHFNC. Not appropriate for therapy assessment. Hold and follow pending palliative conversations.).

## 2025-06-05 NOTE — ACP (ADVANCE CARE PLANNING)
Palliative Care Advance Care Planning Note      Patient Name: Juaquin Degroot                                                                                        Patient MRN: [id number]  Discussion Date: 06/05/25   Discussion Participants: Pt's son Kendrick, his wife and Kristina pt's DIL   Start time: 13:20 , 16:25  End time: 13:40       17:00    Today participants wished to discuss Advance Care Planning. The following summarizes our discussion.  17:30  Code Status Discussion: DNR/Allow Natural Death  Comments: NO escalation of care.  Advance Care Planning: Health Care Representative/ Surrogate Decision Maker  Name of Health Care Representative/ Surrogate Decision Maker: His son Kendrick is his assigned representative, both sons are part of his goc discussions  Advance Care Planning Documents Present in Chart: -- (none on chart)  Palliative Care Medical Status/Prognosis: Hours to Days  Goals of Care: Life-prolonging with limitations (see comments)  Comments: Met with pt's 2 sons and 2 DIL along with Dr. Tinoco, then again severately to review comfort focus EOL care,   Mr. Degroot has continued to decline throughout the day with repeated periods of hypotension, agitaion.  He appears to be at or approaching EOL.  Patient Capacity: does not have capacity      13:40 GO   Plan: DNR, Family to discuss what would be acceptable next steps for their dad, if he were not to improve or decompensate.  During our visit today, we discussed:   Code Status Discussion: DNR/Allow Natural Death  Advance Care Planning: Health Care Representative/ Surrogate Decision Maker  Name of Health Care Representative/ Surrogate Decision Maker: Reported as Kendrick Degroot  Advance Care Planning Documents Present in Chart: -- (none on chart)  Palliative Care Medical Status/Prognosis: Variable dependent on goals of care  Comments: GOC reviewed at bedside, current clinical concerns reviewed  with pt's son at bedside.   I discussed currently high level of oxygen  requirements, worsenign CXR, if they would want  pressors in setting of hypotention, along with comfort focus EOL if he continues to decompensate. Pt's other son will be here shortly and they will discuss further.  Patient Capacity: does not have capacity            Attestation Statement:  I have spent a total of 55 minutes in face-to-face discussion of patient advance care planning with the patient and/or surrogate decision makers.  No active management of the patient’s problem(s) was undertaken during the time period reported      CHING Thacker  6/5/2025 1:51 PM

## 2025-06-05 NOTE — ASSESSMENT & PLAN NOTE
In setting Emphysema, lung cancer, chf   Currently with increase wob , rr 32-36, he is restless, some confusions, on HHFC and NRB, currently being treated for multifocal pneumonia, CHF,, with worsening serial CXRs.      On antiboitics, diuretics, oxygen, steroids, inhalers.     Plan  Morphine 2mg q 2hr prn dyspnea  Ativan 1mg q 4h prn anxiety, agitation, refractory dyspnea

## 2025-06-05 NOTE — PROGRESS NOTES
Infectious Disease Progress Note    Patient Name: Juaquin Linda  MR#: [id number]  : 1945  Admission Date: 2025  Date: 25   Time: 7:40 AM   Author: Jovanni Zuniga MD    Major Events:   No acute events overnight  Patient remains afebrile, HD stable, high flow nasal cannula 60 L/min, 100% FiO2  Leukocytosis improved to 17K, thrombocytopenia to 98    Microbiology:   RVP negative   blood cultures 2 of 2 positive for coagulase-negative staph MRSE  / blood cultures no growth   respiratory culture polymicrobial, including yeast, likely contaminated     Radiology:   chest x-ray pulmonary edema, bilateral pleural effusions, patchy airspace opacities concerning for multifocal pneumonia.   CT angio of the chest, abdomen, pelvis.  No PE.  Extensive bilateral groundglass opacities, right lower lung consolidation with mediastinal/hilar lymphadenopathy.  Hepatic lesions concerning for metastatic disease     Antimicrobial history:  Ceftriaxone -P  Azithromycin -p  Vancomycin 6/3-p    Antibiotics:    Anti-infectives (From admission, onward)      Start     Dose/Rate Route Frequency Ordered Stop    25 0930  vancomycin 1.5 g/500 mL IVPB in NSS         1,500 mg  250 mL/hr over 120 Minutes intravenous Every 12 hours interval 25 2201      25 0737  vancomycin Therapy by Pharmacy Protocol 1 each         1 each Other evaluate daily 25 0737      25 1515  azithromycin (ZITHROMAX) IVPB 500 mg in 250 mL NSS vial in bag         500 mg  250 mL/hr over 60 Minutes intravenous Every 24 hours interval 25 1752      25 1515  cefTRIAXone (ROCEPHIN) IVPB 1 g in 100 mL NSS vial in bag         1 g  200 mL/hr over 30 Minutes intravenous Every 24 hours interval 25 1752              Subjective   Patient feels more weak with ongoing shortness of breath.  No fevers or chills.   Patient's son and primary RN at the bedside today    Review of Systems    All other systems reviewed  and negative except as noted in the HPI.    Objective     Vital Signs:    Patient Vitals for the past 72 hrs:   BP Temp Temp src Pulse Resp SpO2 Height Weight   06/05/25 0600 (!) 96/54 -- -- (!) 108 -- (!) 81 % -- --   06/05/25 0400 (!) 125/95 36.2 °C (97.1 °F) Axillary 96 -- 97 % -- --   06/05/25 0234 -- -- -- -- -- 93 % -- --   06/05/25 0200 117/61 -- -- 90 -- 94 % -- --   06/05/25 0000 128/63 36.3 °C (97.3 °F) Oral 91 -- (!) 91 % -- --   06/04/25 2359 -- -- -- 98 -- -- -- --   06/04/25 2341 -- -- -- -- -- 96 % -- --   06/04/25 2330 -- -- -- -- -- 97 % -- --   06/04/25 2315 -- -- -- -- -- 100 % -- --   06/04/25 2300 -- -- -- -- -- (!) 87 % -- --   06/04/25 2245 -- -- -- -- -- (!) 85 % -- --   06/04/25 2230 -- -- -- -- -- 100 % -- --   06/04/25 2215 -- -- -- -- -- 99 % -- --   06/04/25 2200 110/61 -- -- 88 -- 97 % -- --   06/04/25 2145 -- -- -- -- -- 92 % -- --   06/04/25 2130 -- -- -- -- -- 95 % -- --   06/04/25 2115 -- -- -- -- -- 96 % -- --   06/04/25 2100 -- -- -- -- -- 99 % -- --   06/04/25 2045 -- -- -- -- -- 100 % -- --   06/04/25 2030 -- -- -- -- -- 100 % -- --   06/04/25 2015 -- -- -- -- -- 99 % -- --   06/04/25 2004 (!) 103/58 -- -- -- -- -- -- --   06/04/25 2000 (!) 103/58 36.2 °C (97.2 °F) Oral 97 -- 95 % -- --   06/04/25 1959 -- -- -- 99 -- -- -- --   06/04/25 1955 -- -- -- -- (!) 28 (!) 79 % -- --   06/04/25 1945 -- -- -- -- -- 100 % -- --   06/04/25 1800 105/66 -- -- (!) 104 (!) 28 93 % -- --   06/04/25 1600 99/67 36.7 °C (98.1 °F) Oral (!) 101 (!) 26 95 % -- --   06/04/25 1439 -- -- -- -- (!) 28 96 % -- --   06/04/25 1400 (!) 115/55 -- -- 94 (!) 26 96 % -- --   06/04/25 1230 -- -- -- 91 -- 92 % -- --   06/04/25 1200 -- -- -- (!) 106 -- -- -- --   06/04/25 1200 (!) 104/59 36.4 °C (97.6 °F) Oral 92 (!) 30 97 % -- --   06/04/25 1000 (!) 115/55 -- -- 98 (!) 28 93 % -- --   06/04/25 0800 (!) 119/59 36.7 °C (98 °F) Oral 82 (!) 28 93 % -- --   06/04/25 0759 -- -- -- 82 -- -- -- --   06/04/25 0758 -- -- --  "-- (!) 23 (!) 91 % -- --   06/04/25 0700 (!) 107/52 -- -- 85 -- 94 % -- --   06/04/25 0600 (!) 105/53 -- -- 79 -- 95 % -- --   06/04/25 0500 (!) 104/58 -- -- 74 -- 93 % -- --   06/04/25 0400 (!) 101/57 -- -- 77 -- 94 % -- --   06/04/25 0326 -- -- -- 78 -- -- -- --   06/04/25 0300 (!) 92/56 36.3 °C (97.4 °F) Axillary 81 -- 97 % -- --   06/04/25 0243 -- -- -- -- -- 98 % -- --   06/04/25 0200 (!) 85/47 -- -- 80 -- 98 % -- --   06/04/25 0100 (!) 94/52 -- -- 73 -- 98 % -- --   06/04/25 0000 (!) 103/55 -- -- 80 -- 100 % -- --   06/03/25 2315 (!) 92/51 -- -- 80 20 97 % -- --   06/03/25 2300 -- 36.9 °C (98.5 °F) Axillary 80 -- 98 % -- --   06/03/25 2245 -- -- -- -- -- 100 % -- --   06/03/25 2230 -- -- -- -- -- 95 % -- --   06/03/25 2215 (!) 94/49 -- -- 84 -- 99 % -- --   06/03/25 2210 (!) 88/53 -- -- 86 -- 100 % -- --   06/03/25 2200 -- -- -- 88 -- 100 % -- --   06/03/25 2145 -- -- -- -- (!) 100 -- -- --   06/03/25 2115 (!) 87/55 -- -- 93 -- 100 % -- --   06/03/25 2100 -- -- -- 97 -- 100 % -- --   06/03/25 2030 (!) 87/53 -- -- 93 -- 99 % -- --   06/03/25 2009 -- -- -- -- -- 99 % -- --   06/03/25 2000 (!) 84/50 -- -- (!) 109 -- 100 % -- --   06/03/25 1945 -- -- -- -- -- (!) 91 % -- --   06/03/25 1942 (!) 111/58 -- -- (!) 114 -- 94 % -- --   06/03/25 1930 -- -- -- -- -- 97 % -- --   06/03/25 1908 -- -- -- (!) 129 -- -- -- --   06/03/25 1900 -- 36.3 °C (97.3 °F) Axillary (!) 107 -- 100 % -- --   06/03/25 1800 (!) 112/55 -- -- (!) 107 (!) 24 (!) 90 % -- --   06/03/25 1630 -- -- -- (!) 101 -- (!) 88 % -- --   06/03/25 1627 -- -- -- (!) 108 -- -- -- --   06/03/25 1600 (!) 101/54 36.8 °C (98.2 °F) Oral 98 (!) 26 92 % -- --   06/03/25 1456 -- -- -- -- (!) 28 95 % -- --   06/03/25 1400 (!) 121/55 -- -- 100 (!) 30 100 % -- --   06/03/25 1200 (!) 105/54 -- -- (!) 101 (!) 24 (!) 87 % -- --   06/03/25 1021 -- -- -- -- -- -- 1.854 m (6' 1\") 99.8 kg (220 lb)   06/03/25 1000 (!) 100/56 -- -- 99 (!) 24 93 % -- --   06/03/25 0854 (!) 110/55 " -- -- (!) 101 -- -- -- --   25 0800 (!) 110/55 36.5 °C (97.7 °F) Oral 91 (!) 24 94 % -- --   25 0759 -- -- -- 92 -- -- -- --   25 0600 (!) 103/56 -- -- 93 20 97 % -- --   25 0400 (!) 108/54 36.4 °C (97.5 °F) Oral 91 20 97 % -- --   25 0337 -- -- -- -- 20 99 % -- --   25 0200 (!) 114/54 -- -- 85 18 96 % -- --   25 0000 (!) 104/53 36.4 °C (97.5 °F) Oral 86 18 93 % -- --   25 2343 -- -- -- 77 -- -- -- --   25 2340 -- -- -- -- 18 93 % -- --   25 2200 (!) 101/58 -- -- 94 18 94 % -- --   25 2100 (!) 106/59 36.4 °C (97.5 °F) Oral 95 20 98 % -- --   25 -- -- -- -- 20 96 % -- --   25 1959 -- -- -- (!) 104 -- -- -- --   25 1900 (!) 117/58 -- -- (!) 105 20 92 % -- --   25 1800 (!) 110/54 -- -- 100 (!) 26 96 % -- --   25 1600 (!) 105/51 -- -- 97 (!) 26 (!) 90 % -- --   25 1300 (!) 95/50 -- -- (!) 103 (!) 26 94 % -- --   25 1205 (!) 102/53 -- -- (!) 105 (!) 27 95 % -- --   25 1200 (!) 102/53 36.3 °C (97.3 °F) Temporal (!) 105 (!) 57 93 % -- --   25 1153 -- -- -- -- (!) 26 95 % -- --   25 1035 (!) 90/54 -- -- 93 (!) 28 97 % -- --   25 1000 (!) 96/52 -- -- 98 (!) 52 97 % -- --   25 0900 (!) 103/56 -- -- (!) 105 (!) 26 99 % -- --   25 0800 111 (!) 35 °C (95 °F) Axillary (!) 112 (!) 35 97 % -- --   25 0759 111 -- -- (!) 109 20 96 % -- --       Temp (72hrs), Av.4 °C (97.5 °F), Min:35 °C (95 °F), Max:36.9 °C (98.5 °F)      Physical Exam:    General Appearance:    Alert, cooperative, no distress, appears stated age   Head:    Normocephalic, without obvious abnormality, atraumatic   Lungs:   Decreased breath sounds, bibasilar crackles   Chest wall:    No tenderness or deformity   Heart:    Regular rate and rhythm, S1 and S2 normal, no murmur, rub   or gallop   Abdomen:     Soft, non-tender, bowel sounds active all    Extremities:  Musculoskeletal   Extremities normal,  atraumatic, no cyanosis or edema    No injury or deformity   Skin:   Skin color, texture, turgor normal, no rashes or lesions   Neurologic:     Behavior/  Emotional:   Aox3       Appropriate, cooperative       Lines, Drains, Airways, Wounds:  Peripheral IV (Adult) 06/01/25 Right Antecubital (Active)   Number of days: 2       Peripheral IV (Adult) 06/01/25 Anterior;Distal;Left;Upper Arm (Active)   Number of days: 2       Urethral Catheter Latex;Temperature probe 16 Fr (Active)   Number of days: 2       Labs:    CBC Results         06/05/25 06/04/25 06/03/25     0525 0514 0519    WBC 17.93 21.62 27.68    RBC 2.60 2.39 2.55    HGB 9.1 8.3 8.7    HCT 28.4 26.2 28.0    .2 109.6 109.8    MCH 35.0 34.7 34.1    MCHC 32.0 31.7 31.1    PLT 98 99 117           Comment for HGB at 0519 on 06/03/25: ALL RESULTS HAVE BEEN CHECKED    Comment for PLT at 0514 on 06/04/25: CONFIRMED WITH SMEAR ESTIMATE SPECIMEN QUALITY CHECKED          BMP Results         06/05/25 06/04/25 06/03/25     0525 0514 0519     134 134    K 4.3 4.0 4.2    Cl 103 102 102    CO2 22 25 23    Glucose 317 223 227    BUN 31 29 26    Creatinine 0.9 1.1 0.8    Calcium 8.5 8.7 8.5    Anion Gap 9 7 9    EGFR >60.0 >60.0 >60.0           Comment for EGFR at 0525 on 06/05/25: Calculation based on the Chronic Kidney Disease Epidemiology Collaboration (CKD-EPI) equation refit without adjustment for race.    Comment for EGFR at 0514 on 06/04/25: Calculation based on the Chronic Kidney Disease Epidemiology Collaboration (CKD-EPI) equation refit without adjustment for race.    Comment for EGFR at 0519 on 06/03/25: Calculation based on the Chronic Kidney Disease Epidemiology Collaboration (CKD-EPI) equation refit without adjustment for race.          PT/PTT Results         06/05/25 06/04/25 06/04/25     0136 1900 1241    PTT 83 135 72           Comment for PTT at 0136 on 06/05/25: The Standard Therapeutic Range for Heparin is 74 to 106 seconds.    Comment for PTT  at 1900 on 06/04/25: The Standard Therapeutic Range for Heparin is 74 to 106 seconds.    Comment for PTT at 1241 on 06/04/25: The Standard Therapeutic Range for Heparin is 74 to 106 seconds.          UA Results         06/01/25 2026    Color Yellow    Clarity Clear    Glucose Negative    Bilirubin Negative    Ketones Negative    Sp Grav 1.035    Blood Negative    Ph 6.0    Protein Negative    Urobilinogen 0.2    Nitrite Negative    Leuk Est Negative           Comment for Blood at 2026 on 06/01/25: The sensitivity of the occult blood test is equivalent to approximately 4 intact RBC/HPF.    Comment for Leuk Est at 2026 on 06/01/25: Results can be falsely negative due to high specific gravity, some antibiotics, glucose >3 g/dl, or WBC other than neutrophils.          Lactate Results         06/01/25 06/01/25 06/01/25 2342 2050 1936    Lactate 1.9 2.2 2.4            Microbiology Results       Procedure Component Value Units Date/Time    Blood Culture Blood, Venous [[id number]]  (Abnormal) Collected: 06/01/25 1511    Specimen: Blood, Venous Updated: 06/02/25 1651     Culture **Positive Culture**     Gram Stain Result Gram positive cocci in clusters    Blood Culture Blood, Venous [[id number]]  (Abnormal) Collected: 06/01/25 1448    Specimen: Blood, Venous Updated: 06/02/25 1448     Culture **Positive Culture**     Gram Stain Result Gram positive cocci in pairs and clusters    Blood Culture PCR Panel Blood, Venous [[id number]]  (Abnormal) Collected: 06/01/25 1448    Specimen: Blood, Venous Updated: 06/02/25 1447     Candida albicans Not Detected     Candida auris Not Detected     Candida glabrata Not Detected     Candida krusei Not Detected     Candida parapsilosis Not Detected     Cryptococcus neoformans/gattii Not Detected     Enterococcus faecalis Not Detected     Enterococcus faecium Not Detected     Enterobacterales Not Detected     Enterobacter cloacae complex Not Detected     Escherichia coli Not Detected      Klebsiella oxytoca Not Detected     Klebsiella pneumoniae Not Detected     Klebsiella aerogenes Not Detected     Proteus Not Detected     Salmonella species Not Detected     Serratia marcescens Not Detected     Haemophilus influenzae Not Detected     Listeria monocytogenes Not Detected     Neisseria meningitidis Not Detected     Pseudomonas aeruginosa Not Detected     Stenotrophomonas maltophilia Not Detected     Bacteroides fragilis Not Detected     Staphylococcus aureus Not Detected     Staphylococcus ludgnensis Not Detected     Staphylococcus epidermidis Detected     Streptococcus Not Detected     Streptococcus agalactiae (Group B) Not Detected     Streptococcus pneumoniae Not Detected     Streptococcus pyogenes (Group A) Not Detected     KPC (Carbapenem Resistance Gene) Not Applicable     mecA/C Detected     mecA/C and MREJ (MRSA) Not Applicable     Jasmin/B (Vancomycin Resistance Gene) Not Applicable     CTX-M (ESBL) Not Applicable     IMP Not Applicable     mcr-1 Not Applicable     NDM Not Applicable     OXA-48-like Not Applicable     VIM Not Applicable     Comment: --    SARS-COV-2 (COVID-19)/ FLU A/B, AND RSV, PCR Nasopharynx [[id number]]  (Normal) Collected: 06/01/25 1419    Specimen: Nasopharyngeal Swab from Nasopharynx Updated: 06/01/25 1502     SARS-CoV-2 (COVID-19) Negative     Influenza A Negative     Influenza B Negative     Respiratory Syncytial Virus Negative    Narrative:      Testing performed using real-time PCR for detection of COVID-19. EUA approved validation studies performed on site.             Pathology Results       ** No results found for the last 720 hours. **            Echo:         Imaging:    Radiology Imaging    XR CHEST 1 VW    Narrative  CLINICAL HISTORY:   Increased oxygen requirement    COMMENT:    PROCEDURE: Single frontal view of the chest.    COMPARISON:   Prior chest x-ray and chest CT dated June 1, 2025    Support lines, tubes, devices, and surgical hardware, material:  Monitoring  leads/wires overlie the patient.    Lungs and Pleura: Diffuse bilateral hazy airspace opacity and patchy  consolidation which has progressed. Possible small right pleural effusion. No  pneumothorax.    Cardiovascular and Mediastinum: The cardiomediastinal silhouette is stable.  Cardiomegaly.    Other: No acute osseous abnormality.    Impression  IMPRESSION:  Diffuse bilateral airspace opacity, progressed      Assessment   >> Acute hypoxic respiratory failure likely due to multifocal pneumonia, though multifactorial with history of CHF, COPD, pulm fibrosis  >> GPC's on blood culture, [employer] MRSE  >> History of lung cancer     Plan  - c/w ceftriaxone and azithromycin   - 6/1 blood cultures 2 of 2 positive for Staph epidermidis, repeat blood cultures  - Although Staph epidermidis is oxacillin sensitive, given unresolving pneumonia okay to c/w Vancomycin GT15-20     - TTE no obvious mass or vegetation  - Respiratory culture apply microbial with epithelial cells and yeast, likely contaminated  -Given lack of clinical improvement will initiate fungal and viral workup: Fungitell, Aspergillus, histo, expanded RVP     Infectious disease will continue to follow-up  Case discussed with Dr. Santa Zuniga MD  Infectious Disease   [facility]

## 2025-06-05 NOTE — PLAN OF CARE
Care Coordination Discharge Plan Note     Discharge Needs Assessment  Concerns to be Addressed: adjustment to diagnosis/illness, care coordination/care conferences, discharge planning, decision-making  Current Discharge Risk: physical impairment, chronically ill    Anticipated Discharge Plan  Anticipated Discharge Disposition: home with home health, inpatient hospice, skilled nursing facility  Type of Home Care Services: home OT, home PT, nursing, home hospice    Type of Skilled Nursing Care Services: OT, PT, nursing      Patient Choice  Offered/Gave Vendor List:         ---------------------------------------------------------------------------------------------------------------------    Interdisciplinary Discharge Plan Review:  Participants:advanced practice provider, nursing    Concerns Comments: chart reviewed & patient is on NRB & maxxed out on HHF. patient is to start precedex to assist iwth agitation. patient does not tolerate bipap. [facility] & [facility] for orders for snf/rehab or any services post d/c. [facility] is aware [facility] current medical status.  case management to follow.  Patient was a rapid response. Family speaking to palliative care for goals of care. Patient is a dnr. Awaiting for other family members to come in. Patient is on bipap & with worsening respiratory status.   Discharge Plan:   Disposition/Destination:   /    Discharge Facility:    Community Resources:      Discharge Transportation:  Is Out of Hospital DNR needed at Discharge:    Does patient need discharge transport?

## 2025-06-06 ENCOUNTER — POST MORTEM DOCUMENTATION (OUTPATIENT)
Dept: HEALTH INFORMATION MANAGEMENT | Age: 80
End: 2025-06-06
Payer: COMMERCIAL

## 2025-06-06 LAB
BACTERIA BLD CULT: ABNORMAL
GRAM STN SPEC: ABNORMAL
GRAM STN SPEC: ABNORMAL
MICROORGANISM SPEC CULT: NORMAL

## 2025-06-07 LAB
1,3 BETA GLUCAN SER QL: NEGATIVE
1,3 BETA GLUCAN SER-MCNC: <31 PG/ML
BACTERIA BLD CULT: NORMAL
BACTERIA BLD CULT: NORMAL

## 2025-06-09 LAB
GALACTOMANNAN AG SERPL IA-ACNC: 0.04
GALACTOMANNAN AG SERPL QL IA: NOT DETECTED
H CAPSUL AG UR-MCNC: <0.2 NG/ML

## 2025-06-09 NOTE — PROGRESS NOTES
On June 9, 2025, at 1022, death recorded in the hospital internal restraint log. Not required to be reported to CMS. Restraints did not contribute to death.